# Patient Record
Sex: FEMALE | Race: WHITE | NOT HISPANIC OR LATINO | Employment: OTHER | URBAN - METROPOLITAN AREA
[De-identification: names, ages, dates, MRNs, and addresses within clinical notes are randomized per-mention and may not be internally consistent; named-entity substitution may affect disease eponyms.]

---

## 2017-01-06 ENCOUNTER — OFFICE VISIT (OUTPATIENT)
Dept: MEDICAL GROUP | Facility: CLINIC | Age: 72
End: 2017-01-06
Payer: MEDICARE

## 2017-01-06 VITALS
RESPIRATION RATE: 16 BRPM | TEMPERATURE: 98.8 F | HEART RATE: 60 BPM | WEIGHT: 86 LBS | BODY MASS INDEX: 15.24 KG/M2 | SYSTOLIC BLOOD PRESSURE: 118 MMHG | HEIGHT: 63 IN | OXYGEN SATURATION: 99 % | DIASTOLIC BLOOD PRESSURE: 70 MMHG

## 2017-01-06 DIAGNOSIS — F32.2 MAJOR DEPRESS DIS, SEVERE: ICD-10-CM

## 2017-01-06 DIAGNOSIS — Z86.73 RECENT CEREBROVASCULAR ACCIDENT: ICD-10-CM

## 2017-01-06 DIAGNOSIS — M05.79 RHEUMATOID ARTHRITIS INVOLVING MULTIPLE SITES WITH POSITIVE RHEUMATOID FACTOR (HCC): ICD-10-CM

## 2017-01-06 DIAGNOSIS — Z79.891 CHRONIC USE OF OPIATE FOR THERAPEUTIC PURPOSE: ICD-10-CM

## 2017-01-06 DIAGNOSIS — M80.08XD FRACTURE OF VERTEBRA DUE TO OSTEOPOROSIS WITH ROUTINE HEALING: ICD-10-CM

## 2017-01-06 DIAGNOSIS — M54.89 VERTEBROGENIC PAIN SYNDROME: ICD-10-CM

## 2017-01-06 DIAGNOSIS — R63.4 UNINTENTIONAL WEIGHT LOSS OF MORE THAN 10% BODY WEIGHT WITHIN 6 MONTHS: ICD-10-CM

## 2017-01-06 PROCEDURE — 99214 OFFICE O/P EST MOD 30 MIN: CPT | Performed by: FAMILY MEDICINE

## 2017-01-06 RX ORDER — HYDROCODONE BITARTRATE AND ACETAMINOPHEN 10; 325 MG/1; MG/1
1 TABLET ORAL EVERY 4 HOURS PRN
Qty: 180 TAB | Refills: 0 | Status: SHIPPED | OUTPATIENT
Start: 2017-01-06 | End: 2017-01-06 | Stop reason: SDUPTHER

## 2017-01-06 RX ORDER — MEGESTROL ACETATE 20 MG/1
20 TABLET ORAL DAILY
Qty: 30 TAB | Refills: 3 | Status: SHIPPED | OUTPATIENT
Start: 2017-01-06 | End: 2017-06-10 | Stop reason: SDUPTHER

## 2017-01-06 RX ORDER — ATORVASTATIN CALCIUM 40 MG/1
40 TABLET, FILM COATED ORAL
Qty: 30 TAB | Refills: 6 | Status: SHIPPED | OUTPATIENT
Start: 2017-01-06 | End: 2017-07-03 | Stop reason: SDUPTHER

## 2017-01-06 RX ORDER — HYDROCODONE BITARTRATE AND ACETAMINOPHEN 10; 325 MG/1; MG/1
1 TABLET ORAL EVERY 4 HOURS PRN
Qty: 180 TAB | Refills: 0 | Status: SHIPPED | OUTPATIENT
Start: 2017-01-06 | End: 2017-04-03 | Stop reason: SDUPTHER

## 2017-01-06 RX ORDER — MIRTAZAPINE 15 MG/1
15 TABLET, FILM COATED ORAL
Qty: 30 TAB | Refills: 6 | Status: SHIPPED | OUTPATIENT
Start: 2017-01-06 | End: 2017-07-03 | Stop reason: SDUPTHER

## 2017-01-06 NOTE — MR AVS SNAPSHOT
"        Lakisha Villegas Davy   2017 11:00 AM   Office Visit   MRN: [id number]    Department:  [facility]   Dept Phone:  [phone]    Description:  Female : 1945   Provider:  Mariella Padilla M.D.           Reason for Visit     Arthritis pt refuses HRA    Back Pain     Hyperlipidemia     Depression           Allergies as of 2017     Allergen Noted Reactions    Gold 2007   Hives    Hydrochlorothiazide [Hctz] 10/01/2007   Itching    Imuran [Azathioprine Sodium] 2010   Vomiting    Naproxen 10/01/2007   Itching    Azathioprine 10/01/2007       DOES NOT REMEMBER REACTIOON    Hydrochlorothiazide 2016       Naproxen 2016       Relafen [Nabumetone] 10/01/2007       Did not work    Tape 2015       Paper tape is ok      You were diagnosed with     Unintentional weight loss of more than 10% body weight within 6 months   [9332995]       Vertebrogenic pain syndrome   [260897]       Rheumatoid arthritis involving multiple sites with positive rheumatoid factor (HCC)   [0178953]       Fracture of vertebra due to osteoporosis with routine healing   [7280748]       Chronic use of opiate for therapeutic purpose   [1787008]       Recent cerebrovascular accident   [3805708]       Major depress dis, severe (HCC)   [6845354]         Vital Signs     Blood Pressure Pulse Temperature Respirations Height Weight    118/70 mmHg 60 37.1 °C (98.8 °F) 16 1.6 m (5' 2.99\") 39.009 kg (86 lb)    Body Mass Index Oxygen Saturation Smoking Status             15.24 kg/m2 99% Current Every Day Smoker         Basic Information     Date Of Birth Sex Race Ethnicity Preferred Language    1945 Female White Non- English      Your appointments     2017 11:00 AM   Established Patient with Mariella Padilla M.D.   [address] [place] [id number] [phone]           You will be receiving a confirmation call a few days before your " appointment from our automated call confirmation system.            Jul 13, 2017 10:00 AM   Follow Up Visit with Helen Merino PA-C   [facility] (--)    [address], [address]  [facility] [address] [phone]           You will be receiving a confirmation call a few days before your appointment from our automated call confirmation system.              Problem List              ICD-10-CM Priority Class Noted - Resolved    Rheumatoid arthritis (HCC) M06.9   Unknown - Present    Essential hypertension I10   2/4/2010 - Present    Carotid artery stenosis I65.29   Unknown - Present    History of cerebrovascular accident with residual effects I69.90 High  1/1/2010 - Present    Dyslipidemia, goal LDL below 100 E78.5   Unknown - Present    Postmenopausal osteoporosis M81.0   Unknown - Present    Vitamin D deficiency disease E55.9   Unknown - Present    MEDICAL HOME    10/10/2012 - Present    Vertebrogenic pain syndrome M54.9   Unknown - Present    Tobacco dependence F17.200   1/14/2015 - Present    Fracture of vertebra due to osteoporosis with routine healing M80.88XD   8/14/2015 - Present    Abdominal aortic atherosclerosis (HCC) I70.0   11/10/2015 - Present    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   11/10/2015 - Present    Chronic use of opiate for therapeutic purpose Z79.899   11/10/2015 - Present    Unintentional weight loss of 10% body weight within 6 months R63.4   11/11/2015 - Present    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   12/22/2015 - Present    Cerebrovascular accident (CVA) with involvement of right side of body (HCC) I63.9   5/20/2016 - Present    RA (rheumatoid arthritis) (HCC) M06.9   5/21/2016 - Present    Chronic back pain M54.9, G89.29   5/21/2016 - Present    Hypertension I10   5/21/2016 - Present    Unintended weight loss R63.4   5/21/2016 - Present    Iliac artery stenosis, right (HCC) I77.1   Unknown - Present    Claudication of right lower extremity (HCC) I73.9   12/2/2016 -  Present    Major depress dis, severe (HCC) F32.2   1/6/2017 - Present      Health Maintenance        Date Due Completion Dates    IMM ZOSTER VACCINE 10/21/2007 ---    MAMMOGRAM 4/22/2017 4/22/2016, 9/20/2013, 12/30/2011, 12/30/2011 (Prv Comp), 2/16/2011, 2/8/2011    Override on 12/30/2011: Previously completed    BONE DENSITY 9/20/2018 9/20/2013, 6/8/2010, 6/18/2007, 2/11/2005    COLONOSCOPY 1/24/2019 1/24/2016    IMM DTaP/Tdap/Td Vaccine (2 - Td) 10/8/2023 10/8/2013            Current Immunizations     13-VALENT PCV PREVNAR 11/18/2016    Influenza Vaccine Adult HD 11/18/2016, 10/12/2015  9:32 AM, 11/10/2013    Influenza Vaccine Quad Inj (Pf) 10/3/2014    Pneumococcal Vaccine (UF)Historical Data 10/9/2007    Pneumococcal polysaccharide vaccine (PPSV-23) 1/14/2015    Tdap Vaccine 10/8/2013      Below and/or attached are the medications your provider expects you to take. Review all of your home medications and newly ordered medications with your provider and/or pharmacist. Follow medication instructions as directed by your provider and/or pharmacist. Please keep your medication list with you and share with your provider. Update the information when medications are discontinued, doses are changed, or new medications (including over-the-counter products) are added; and carry medication information at all times in the event of emergency situations     Allergies:  GOLD - Hives     HYDROCHLOROTHIAZIDE - Itching     IMURAN - Vomiting     NAPROXEN - Itching     AZATHIOPRINE - (reactions not documented)     HYDROCHLOROTHIAZIDE - (reactions not documented)     NAPROXEN - (reactions not documented)     RELAFEN - (reactions not documented)     TAPE - (reactions not documented)               Medications  Valid as of: January 06, 2017 -  3:08 PM    Generic Name Brand Name Tablet Size Instructions for use    Aspirin (Tablet Delayed Response) ECOTRIN 81 MG Take 650 mg by mouth every day.        Atorvastatin Calcium (Tab) LIPITOR 40  MG Take 1 Tab by mouth every bedtime.        Clopidogrel Bisulfate (Tab) PLAVIX 75 MG Take 1 Tab by mouth every day.        DiltiaZEM HCl Coated Beads (CAPSULE SR 24 HR) CARDIZEM  MG TAKE ONE CAPSULE BY MOUTH ONCE DAILY        Folic Acid (Tab) FOLVITE 1 MG Take 2 mg by mouth every day.        Hydrocodone-Acetaminophen (Tab) NORCO  MG Take 1 Tab by mouth every four hours as needed for Moderate Pain or Severe Pain.        Lisinopril (Tab) PRINIVIL, ZESTRIL 40 MG Take 1 Tab by mouth every day.        LORazepam (Tab) ATIVAN 0.5 MG Take 1 Tab by mouth 2 times a day as needed for Anxiety.        Megestrol Acetate (Tab) MEGACE 20 MG Take 1 Tab by mouth every day.        Mirtazapine (Tab) REMERON 15 MG Take 1 Tab by mouth every bedtime.        Misc. Devices (Misc) Misc. Devices  This is an order for home oxygen. 2 L nasal prongs. O2 sat 82% at rest on room air today from home health nurse. Diagnosis hypoxia, recent CVA    R09.02, Z78.9       MAYA 99 months   NPI [id number]        Potassium Chloride (Tab CR) KLOR-CON 10 MEQ Take 1 Tab by mouth every day.        PredniSONE (Tab) DELTASONE 5 MG Take 5 mg by mouth every day.        SulfaSALAzine (Tab) AZULFIDINE 500 MG Take 500 mg by mouth 2 times a day.        TraZODone HCl (Tab) DESYREL 100 MG Take 1 Tab by mouth every bedtime.        .                 Medicines prescribed today were sent to:     [facility] [address] [place] [address] [place] [address]    Phone: [phone] Fax: [phone]    Open 24 Hours?: No      Medication refill instructions:       If your prescription bottle indicates you have medication refills left, it is not necessary to call your provider’s office. Please contact your pharmacy and they will refill your medication.    If your prescription bottle indicates you do not have any refills left, you may request refills at any time through one of the following ways: The online [employer] system (except  Urgent Care), by calling your provider’s office, or by asking your pharmacy to contact your provider’s office with a refill request. Medication refills are processed only during regular business hours and may not be available until the next business day. Your provider may request additional information or to have a follow-up visit with you prior to refilling your medication.   *Please Note: Medication refills are assigned a new Rx number when refilled electronically. Your pharmacy may indicate that no refills were authorized even though a new prescription for the same medication is available at the pharmacy. Please request the medicine by name with the pharmacy before contacting your provider for a refill.        Other Notes About Your Plan     [facility]  patient has been treated for her arthritic pain with Norco and then Percocet for > 1 year. ? Opioid dependence, continuous code 304.01           MyChart Access Code: Activation code not generated  Current [facility] Status: Active

## 2017-01-06 NOTE — PROGRESS NOTES
CC: weight loss, back pain, pain from rheumatoid arthritis    HPI:   Lakisha presents today with the following.    1. Unintentional weight loss of more than 10% body weight within 6 months  She has persistent weight loss.   She feels the celexa has helped her mood along with the trazodone but has further reduced her appetite.    2. Vertebrogenic pain syndrome/fracture of vertebra due to osteoporosis with routine healing.  The vertebral fracture was a major source of pain but the multiple level osteoarthritis and rheumatologic changes from the rheumatoid arthritis are also a factor. This is a source of chronic pain. Unfortunately, she is unable to take anti-inflammatories due in part to being on an anticoagulant now chronically but also because she is fundamentally allergic to nonsteroidal anti-inflammatories anyway.    3. Rheumatoid arthritis involving multiple sites with positive rheumatoid factor (HCC)  She continues to follow closely with Dr. Goodwin who is her rheumatologist. She has been following with him for a long time. They have tried several Biologics which she was unable to tolerate. She is on Azulfidine and low-dose steroids. They have tried to minimize the steroids as she already has osteoporosis but she is between a rock and a hard place. She was on Miacalcin to assist her bone density. This will need reevaluation. She is working on stopping her smoking which is also a factor.    4. Chronic use of opiate for therapeutic purpose  No aberrant behavior with medications or addictive behaviors been observed.    6. Recent cerebrovascular accident  She actually had 2 CVAs. She is now on Plavix and aspirin indefinitely.  She has been compliant with this but has been unable to stop smoking. Discussed smoking cessation. Discussed the importance of good diet and good blood pressure control. She knows to avoid all anti-inflammatories with her anticoagulation.    7. Major depress dis, severe (HCC)  This is quite a  serious problem. She is having continued loss of appetite. She feels much better otherwise and has been more interactive with family and much less tearful. She feels the Celexa is helpful but is afraid that it is reducing her appetite. I feel she may be correct. She was unable to tolerate the Paxil. I would like to change her to an antidepressant that has more association with weight gain and is less closely related to the Paxil      Patient Active Problem List    Diagnosis Date Noted   • History of cerebrovascular accident with residual effects 01/01/2010     Priority: High   • Major depress dis, severe ([facility]) 01/06/2017   • Claudication of right lower extremity ([facility]) 12/02/2016   • Iliac artery stenosis, right ([facility])    • RA (rheumatoid arthritis) ([facility]) 05/21/2016   • Chronic back pain 05/21/2016   • Hypertension 05/21/2016   • Unintended weight loss 05/21/2016   • Cerebrovascular accident (CVA) with involvement of right side of body ([facility]) 05/20/2016   • 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   • Unintentional weight loss of 10% body weight within 6 months 11/11/2015   • Abdominal aortic atherosclerosis ([facility]) 11/10/2015   • 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[facility]) 11/10/2015   • Chronic use of opiate for therapeutic purpose 11/10/2015   • Fracture of vertebra due to osteoporosis with routine healing 08/14/2015   • Tobacco dependence 01/14/2015   • Vertebrogenic pain syndrome    • MEDICAL HOME 10/10/2012   • Postmenopausal osteoporosis    • Vitamin D deficiency disease    • Dyslipidemia, goal LDL below 100    • Essential hypertension 02/04/2010   • Carotid artery stenosis    • Rheumatoid arthritis ([facility])        Current Outpatient Prescriptions   Medication Sig Dispense Refill   • predniSONE (DELTASONE) 5 MG Tab Take 5 mg by mouth every day.     • citalopram (CELEXA) 20 MG Tab Take 1 Tab by mouth every day. 30 Tab 6   • lorazepam (ATIVAN) 0.5 MG Tab Take 1 Tab by mouth 2 times a day as needed for Anxiety. 60 Tab 3   •  "hydrocodone/acetaminophen (NORCO)  MG Tab Take 1 Tab by mouth every four hours as needed for Moderate Pain or Severe Pain. 180 Tab 0   • lisinopril (PRINIVIL, ZESTRIL) 40 MG tablet Take 1 Tab by mouth every day. 90 Tab 3   • trazodone (DESYREL) 100 MG Tab Take 1 Tab by mouth every bedtime. 30 Tab 6   • clopidogrel (PLAVIX) 75 MG Tab Take 1 Tab by mouth every day. 90 Tab 2   • potassium chloride ER (KLOR-CON) 10 MEQ tablet Take 1 Tab by mouth every day. 30 Tab 6   • atorvastatin (LIPITOR) 40 MG Tab Take 1 Tab by mouth every day. 30 Tab 6   • Misc. Devices Misc This is an order for home oxygen. 2 L nasal prongs. O2 sat 82% at rest on room air today from home health nurse. Diagnosis hypoxia, recent CVA    R09.02, Z78.9       MAYA 99 months   NPI [id number] 1 Each 0   • sulfaSALAzine (AZULFIDINE) 500 MG Tab Take 500 mg by mouth 2 times a day.     • diltiazem CD (CARDIZEM CD) 180 MG CAPSULE SR 24 HR TAKE ONE CAPSULE BY MOUTH ONCE DAILY 30 Cap 11   • aspirin EC (ECOTRIN) 81 MG Tablet Delayed Response Take 650 mg by mouth every day.     • folic acid (FOLVITE) 1 MG TABS Take 2 mg by mouth every day.       No current facility-administered medications for this visit.         Allergies as of 01/06/2017 - Carlos as Reviewed 01/06/2017   Allergen Reaction Noted   • Gold Hives 08/13/2007   • Hydrochlorothiazide [hctz] Itching 10/01/2007   • Imuran [azathioprine sodium] Vomiting 02/17/2010   • Naproxen Itching 10/01/2007   • Azathioprine  10/01/2007   • Hydrochlorothiazide  05/20/2016   • Naproxen  05/20/2016   • Relafen [nabumetone]  10/01/2007   • Tape  12/08/2015        ROS: As per HPI.    /70 mmHg  Pulse 60  Temp(Src) 37.1 °C (98.8 °F)  Resp 16  Ht 1.6 m (5' 2.99\")  Wt 39.009 kg (86 lb)  BMI 15.24 kg/m2  SpO2 99%    Physical Exam:  Gen:         Alert and oriented, No apparent distress, more alert and interactive. Not tearful today. Lucid and fluent.  Neck:       Range of motion is somewhat limited in extension " and flexion. Rotation appears to be adequate. No nuchal rigidity. There is posterior cervical spasm and moderate cervical spinous process tenderness. No JVD or carotid bruits are appreciated. No cervical adenopathy is appreciated.   Lungs:     Clear to auscultation bilaterally  CV:          Regular rate and rhythm. No murmurs, rubs or gallops.               Ext:          No clubbing, cyanosis, edema.  Back:       There is spinous process tenderness throughout most of the spine. This is worse in her mid back. She feels this is much improved over what it was at the worst of her pain.  There is bilateral sacroiliac tenderness which is mild. Her hands show mild deformity and stiffness with tenderness to palpation of the MCP joints.      Assessment and Plan.   71 y.o. female with the following issues.    1. Unintentional weight loss of more than 10% body weight within 6 months  She continues to lose weight. I do believe the citalopram is partly to blame but she does need appetite stimulation. Have discussed trial of Megace. We will also switch the antidepressant to one that is more associated with weight gain.  - megestrol (MEGACE) 20 MG Tab; Take 1 Tab by mouth every day.  Dispense: 30 Tab; Refill: 3    2. Vertebrogenic pain syndrome  She has multiple level arthritic change in her back which is a combination of both rheumatoid and osteoarthritis. The majority of her pain has been due to the vertebral fracture. The pain medication continues to be helpful and well tolerated and is renewed  - hydrocodone/acetaminophen (NORCO)  MG Tab; Take 1 Tab by mouth every four hours as needed for Moderate Pain or Severe Pain.  Dispense: 180 Tab; Refill: 0    3. Rheumatoid arthritis involving multiple sites with positive rheumatoid factor (HCC)  She continues following with rheumatology. Her rheumatoid is relatively quiet at this time. She cannot take anti-inflammatories as she is on chronic anticoagulation therapy after her  stroke.  - hydrocodone/acetaminophen (NORCO)  MG Tab; Take 1 Tab by mouth every four hours as needed for Moderate Pain or Severe Pain.  Dispense: 180 Tab; Refill: 0    4. Fracture of vertebra due to osteoporosis with routine healing  The vertebral fracture is improving. The pain medication continues to be helpful and well tolerated and is renewed.  - hydrocodone/acetaminophen (NORCO)  MG Tab; Take 1 Tab by mouth every four hours as needed for Moderate Pain or Severe Pain.  Dispense: 180 Tab; Refill: 0    5. Chronic use of opiate for therapeutic purpose  [facility] Board of pharmacy interfaces without inconsistencies. She keeps the medication locked her under her personal control.  - CONSENT RISKS OF CONTROL SUBST    6. Recent cerebrovascular accident  She continues the atorvastatin and the Plavix since her stroke. She also continues baby aspirin at the direction of the neurologist and cardiologist. She has not been able to stop smoking. She and her  are continuing to work on this.  - atorvastatin (LIPITOR) 40 MG Tab; Take 1 Tab by mouth every bedtime.  Dispense: 30 Tab; Refill: 6    7. Major depress dis, severe (HCC)  This is been very severe and has responded somewhat to the citalopram. She had intolerable side effects to the Plavix. However, the citalopram is further reducing her appetite and her weight is less. Had discussed with her and her  switching to mirtazapine. They will give this a try. If she is doing worse within 2 weeks they will call me. I have explained to her that he is likely to see worsening and appreciated before she is and have asked her to listen to him on this subject. He is very concerned. They have been  many years.  - mirtazapine (REMERON) 15 MG Tab; Take 1 Tab by mouth every bedtime.  Dispense: 30 Tab; Refill: 6          Chronic pain recheck:   Last dose of controlled substance: This afternoon  Chronic pain treated with hydrocodone taken 4-5 times a day  sometimes a little more  Current alcohol or substance use: no    Consequences of Chronic Opiate therapy:  (5 A's)  Analgesia:  improved  Activity:  improved  Adverse Events:  none noted  Aberrant Behaviors: no inappropriate refills requested, lost or stolen meds reported.   Affect/Mood: good grooming, full facial expressions, normal speech pattern and content, normal reasoning    Nonnarcotic treatments that are being used: She follows closely with rheumatology and is on Azulfidine for her rheumatoid arthritis. She has failed several Biologics. She continues on trazodone to try to sleep. She continues on the prednisone to assist in controlling her rheumatoid arthritis. She cannot take anti-inflammatories as she is anticoagulation therapy for her stroke. She is on this indefinitely as she has now had 2 strokes.     Pain management agreement initiated/updated and signed on: Updated again today  Urine drug screen done: July 2016, which was reviewed today and is consistent with prescribed medications.    I have reviewed the medical records, the Prescription Monitoring Program and I have determined that controlled substance treatment is medically indicated.  [facility] board of pharmacy interface is without inconsistencies.  Her average MME is 66, active is 60.  This is within CDC guidelines.

## 2017-04-03 ENCOUNTER — OFFICE VISIT (OUTPATIENT)
Dept: MEDICAL GROUP | Facility: CLINIC | Age: 72
End: 2017-04-03
Payer: MEDICARE

## 2017-04-03 ENCOUNTER — RESOLUTE PROFESSIONAL BILLING HOSPITAL PROF FEE (OUTPATIENT)
Dept: HOSPITALIST | Facility: MEDICAL CENTER | Age: 72
End: 2017-04-03
Payer: MEDICARE

## 2017-04-03 ENCOUNTER — APPOINTMENT (OUTPATIENT)
Dept: RADIOLOGY | Facility: MEDICAL CENTER | Age: 72
End: 2017-04-03
Attending: EMERGENCY MEDICINE
Payer: MEDICARE

## 2017-04-03 ENCOUNTER — HOSPITAL ENCOUNTER (OUTPATIENT)
Facility: MEDICAL CENTER | Age: 72
End: 2017-04-06
Attending: EMERGENCY MEDICINE | Admitting: HOSPITALIST
Payer: MEDICARE

## 2017-04-03 VITALS
OXYGEN SATURATION: 95 % | HEIGHT: 63 IN | TEMPERATURE: 99.7 F | WEIGHT: 91 LBS | BODY MASS INDEX: 16.12 KG/M2 | RESPIRATION RATE: 16 BRPM | SYSTOLIC BLOOD PRESSURE: 160 MMHG | HEART RATE: 72 BPM | DIASTOLIC BLOOD PRESSURE: 80 MMHG

## 2017-04-03 DIAGNOSIS — M25.552 PAIN OF LEFT HIP JOINT: ICD-10-CM

## 2017-04-03 DIAGNOSIS — R63.4 UNINTENDED WEIGHT LOSS: ICD-10-CM

## 2017-04-03 DIAGNOSIS — M05.79 RHEUMATOID ARTHRITIS INVOLVING MULTIPLE SITES WITH POSITIVE RHEUMATOID FACTOR (HCC): ICD-10-CM

## 2017-04-03 DIAGNOSIS — Z79.891 CHRONIC USE OF OPIATE FOR THERAPEUTIC PURPOSE: ICD-10-CM

## 2017-04-03 DIAGNOSIS — I69.90 HISTORY OF CEREBROVASCULAR ACCIDENT WITH RESIDUAL EFFECTS: ICD-10-CM

## 2017-04-03 DIAGNOSIS — M54.89 VERTEBROGENIC PAIN SYNDROME: ICD-10-CM

## 2017-04-03 DIAGNOSIS — M80.08XD FRACTURE OF VERTEBRA DUE TO OSTEOPOROSIS WITH ROUTINE HEALING: ICD-10-CM

## 2017-04-03 PROBLEM — R52 INTRACTABLE PAIN: Status: ACTIVE | Noted: 2017-04-03
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
## 2017-04-03 LAB
ALBUMIN SERPL BCP-MCNC: 3.6 G/DL (ref 3.2–4.9)
ALBUMIN/GLOB SERPL: 1.3 G/DL
ALP SERPL-CCNC: 46 U/L (ref 30–99)
ALT SERPL-CCNC: 12 U/L (ref 2–50)
ANION GAP SERPL CALC-SCNC: 8 MMOL/L (ref 0–11.9)
AST SERPL-CCNC: 17 U/L (ref 12–45)
BASOPHILS # BLD AUTO: 0.7 % (ref 0–1.8)
BASOPHILS # BLD: 0.07 K/UL (ref 0–0.12)
BILIRUB SERPL-MCNC: 0.4 MG/DL (ref 0.1–1.5)
BUN SERPL-MCNC: 24 MG/DL (ref 8–22)
CALCIUM SERPL-MCNC: 9 MG/DL (ref 8.5–10.5)
CHLORIDE SERPL-SCNC: 111 MMOL/L (ref 96–112)
CO2 SERPL-SCNC: 20 MMOL/L (ref 20–33)
CREAT SERPL-MCNC: 0.58 MG/DL (ref 0.5–1.4)
EOSINOPHIL # BLD AUTO: 0.2 K/UL (ref 0–0.51)
EOSINOPHIL NFR BLD: 2.1 % (ref 0–6.9)
ERYTHROCYTE [DISTWIDTH] IN BLOOD BY AUTOMATED COUNT: 57.3 FL (ref 35.9–50)
GFR SERPL CREATININE-BSD FRML MDRD: >60 ML/MIN/1.73 M 2
GLOBULIN SER CALC-MCNC: 2.8 G/DL (ref 1.9–3.5)
GLUCOSE SERPL-MCNC: 98 MG/DL (ref 65–99)
HCT VFR BLD AUTO: 36.9 % (ref 37–47)
HGB BLD-MCNC: 11.6 G/DL (ref 12–16)
IMM GRANULOCYTES # BLD AUTO: 0.06 K/UL (ref 0–0.11)
IMM GRANULOCYTES NFR BLD AUTO: 0.6 % (ref 0–0.9)
INR PPP: 1.04 (ref 0.87–1.13)
LYMPHOCYTES # BLD AUTO: 1.64 K/UL (ref 1–4.8)
LYMPHOCYTES NFR BLD: 16.9 % (ref 22–41)
MCH RBC QN AUTO: 29.7 PG (ref 27–33)
MCHC RBC AUTO-ENTMCNC: 31.4 G/DL (ref 33.6–35)
MCV RBC AUTO: 94.6 FL (ref 81.4–97.8)
MONOCYTES # BLD AUTO: 0.82 K/UL (ref 0–0.85)
MONOCYTES NFR BLD AUTO: 8.5 % (ref 0–13.4)
NEUTROPHILS # BLD AUTO: 6.89 K/UL (ref 2–7.15)
NEUTROPHILS NFR BLD: 71.2 % (ref 44–72)
NRBC # BLD AUTO: 0 K/UL
NRBC BLD AUTO-RTO: 0 /100 WBC
PLATELET # BLD AUTO: 195 K/UL (ref 164–446)
PMV BLD AUTO: 10.2 FL (ref 9–12.9)
POTASSIUM SERPL-SCNC: 3.4 MMOL/L (ref 3.6–5.5)
PROT SERPL-MCNC: 6.4 G/DL (ref 6–8.2)
PROTHROMBIN TIME: 13.9 SEC (ref 12–14.6)
RBC # BLD AUTO: 3.9 M/UL (ref 4.2–5.4)
SODIUM SERPL-SCNC: 139 MMOL/L (ref 135–145)
WBC # BLD AUTO: 9.7 K/UL (ref 4.8–10.8)

## 2017-04-03 PROCEDURE — 85025 COMPLETE CBC W/AUTO DIFF WBC: CPT

## 2017-04-03 PROCEDURE — 36415 COLL VENOUS BLD VENIPUNCTURE: CPT

## 2017-04-03 PROCEDURE — G8432 DEP SCR NOT DOC, RNG: HCPCS | Performed by: FAMILY MEDICINE

## 2017-04-03 PROCEDURE — 96376 TX/PRO/DX INJ SAME DRUG ADON: CPT

## 2017-04-03 PROCEDURE — 1101F PT FALLS ASSESS-DOCD LE1/YR: CPT | Performed by: FAMILY MEDICINE

## 2017-04-03 PROCEDURE — 94760 N-INVAS EAR/PLS OXIMETRY 1: CPT

## 2017-04-03 PROCEDURE — 4004F PT TOBACCO SCREEN RCVD TLK: CPT | Performed by: FAMILY MEDICINE

## 2017-04-03 PROCEDURE — 99285 EMERGENCY DEPT VISIT HI MDM: CPT

## 2017-04-03 PROCEDURE — 700111 HCHG RX REV CODE 636 W/ 250 OVERRIDE (IP): Performed by: EMERGENCY MEDICINE

## 2017-04-03 PROCEDURE — 3014F SCREEN MAMMO DOC REV: CPT | Performed by: FAMILY MEDICINE

## 2017-04-03 PROCEDURE — 99220 PR INITIAL OBSERVATION CARE,LEVL III: CPT | Mod: 25 | Performed by: HOSPITALIST

## 2017-04-03 PROCEDURE — 80053 COMPREHEN METABOLIC PANEL: CPT

## 2017-04-03 PROCEDURE — 85610 PROTHROMBIN TIME: CPT

## 2017-04-03 PROCEDURE — 99213 OFFICE O/P EST LOW 20 MIN: CPT | Performed by: FAMILY MEDICINE

## 2017-04-03 PROCEDURE — G8598 ASA/ANTIPLAT THER USED: HCPCS | Performed by: FAMILY MEDICINE

## 2017-04-03 PROCEDURE — 4040F PNEUMOC VAC/ADMIN/RCVD: CPT | Performed by: FAMILY MEDICINE

## 2017-04-03 PROCEDURE — 96374 THER/PROPH/DIAG INJ IV PUSH: CPT

## 2017-04-03 PROCEDURE — 99406 BEHAV CHNG SMOKING 3-10 MIN: CPT | Performed by: HOSPITALIST

## 2017-04-03 PROCEDURE — G8419 CALC BMI OUT NRM PARAM NOF/U: HCPCS | Performed by: FAMILY MEDICINE

## 2017-04-03 PROCEDURE — 73501 X-RAY EXAM HIP UNI 1 VIEW: CPT | Mod: LT

## 2017-04-03 PROCEDURE — G0378 HOSPITAL OBSERVATION PER HR: HCPCS

## 2017-04-03 PROCEDURE — 96375 TX/PRO/DX INJ NEW DRUG ADDON: CPT

## 2017-04-03 RX ORDER — NICOTINE 21 MG/24HR
21 PATCH, TRANSDERMAL 24 HOURS TRANSDERMAL
Status: DISCONTINUED | OUTPATIENT
Start: 2017-04-04 | End: 2017-04-06 | Stop reason: HOSPADM

## 2017-04-03 RX ORDER — ONDANSETRON 2 MG/ML
4 INJECTION INTRAMUSCULAR; INTRAVENOUS ONCE
Status: COMPLETED | OUTPATIENT
Start: 2017-04-03 | End: 2017-04-03

## 2017-04-03 RX ORDER — HYDROCODONE BITARTRATE AND ACETAMINOPHEN 10; 325 MG/1; MG/1
1 TABLET ORAL EVERY 4 HOURS PRN
Qty: 180 TAB | Refills: 0 | Status: SHIPPED | OUTPATIENT
Start: 2017-04-03 | End: 2017-04-03 | Stop reason: SDUPTHER

## 2017-04-03 RX ORDER — ONDANSETRON 4 MG/1
4 TABLET, ORALLY DISINTEGRATING ORAL EVERY 4 HOURS PRN
Status: DISCONTINUED | OUTPATIENT
Start: 2017-04-03 | End: 2017-04-06 | Stop reason: HOSPADM

## 2017-04-03 RX ORDER — LORAZEPAM 0.5 MG/1
0.5 TABLET ORAL 2 TIMES DAILY PRN
Status: DISCONTINUED | OUTPATIENT
Start: 2017-04-03 | End: 2017-04-06 | Stop reason: HOSPADM

## 2017-04-03 RX ORDER — MORPHINE SULFATE 4 MG/ML
4 INJECTION, SOLUTION INTRAMUSCULAR; INTRAVENOUS ONCE
Status: COMPLETED | OUTPATIENT
Start: 2017-04-03 | End: 2017-04-03

## 2017-04-03 RX ORDER — AMOXICILLIN 250 MG
2 CAPSULE ORAL 2 TIMES DAILY
Status: DISCONTINUED | OUTPATIENT
Start: 2017-04-04 | End: 2017-04-06 | Stop reason: HOSPADM

## 2017-04-03 RX ORDER — ONDANSETRON 2 MG/ML
4 INJECTION INTRAMUSCULAR; INTRAVENOUS EVERY 4 HOURS PRN
Status: DISCONTINUED | OUTPATIENT
Start: 2017-04-03 | End: 2017-04-06 | Stop reason: HOSPADM

## 2017-04-03 RX ORDER — SODIUM CHLORIDE 9 MG/ML
1000 INJECTION, SOLUTION INTRAVENOUS CONTINUOUS
Status: DISCONTINUED | OUTPATIENT
Start: 2017-04-04 | End: 2017-04-06 | Stop reason: HOSPADM

## 2017-04-03 RX ORDER — PREDNISONE 5 MG/1
5 TABLET ORAL DAILY
Status: DISCONTINUED | OUTPATIENT
Start: 2017-04-04 | End: 2017-04-06 | Stop reason: HOSPADM

## 2017-04-03 RX ORDER — SULFASALAZINE 500 MG/1
500 TABLET ORAL 2 TIMES DAILY
Status: DISCONTINUED | OUTPATIENT
Start: 2017-04-04 | End: 2017-04-06 | Stop reason: HOSPADM

## 2017-04-03 RX ORDER — OXYCODONE HYDROCHLORIDE 5 MG/1
5 TABLET ORAL
Status: DISCONTINUED | OUTPATIENT
Start: 2017-04-03 | End: 2017-04-06 | Stop reason: HOSPADM

## 2017-04-03 RX ORDER — MEGESTROL ACETATE 20 MG/1
20 TABLET ORAL DAILY
Status: DISCONTINUED | OUTPATIENT
Start: 2017-04-04 | End: 2017-04-06 | Stop reason: HOSPADM

## 2017-04-03 RX ORDER — DILTIAZEM HYDROCHLORIDE 180 MG/1
180 CAPSULE, COATED, EXTENDED RELEASE ORAL
Status: DISCONTINUED | OUTPATIENT
Start: 2017-04-04 | End: 2017-04-06 | Stop reason: HOSPADM

## 2017-04-03 RX ORDER — POTASSIUM CHLORIDE 750 MG/1
10 TABLET, FILM COATED, EXTENDED RELEASE ORAL DAILY
Status: DISCONTINUED | OUTPATIENT
Start: 2017-04-04 | End: 2017-04-05

## 2017-04-03 RX ORDER — MORPHINE SULFATE 4 MG/ML
4 INJECTION, SOLUTION INTRAMUSCULAR; INTRAVENOUS
Status: DISCONTINUED | OUTPATIENT
Start: 2017-04-03 | End: 2017-04-06 | Stop reason: HOSPADM

## 2017-04-03 RX ORDER — CLOPIDOGREL BISULFATE 75 MG/1
75 TABLET ORAL DAILY
Status: DISCONTINUED | OUTPATIENT
Start: 2017-04-04 | End: 2017-04-06 | Stop reason: HOSPADM

## 2017-04-03 RX ORDER — HYDROCODONE BITARTRATE AND ACETAMINOPHEN 10; 325 MG/1; MG/1
1 TABLET ORAL EVERY 4 HOURS PRN
Qty: 180 TAB | Refills: 0 | Status: SHIPPED | OUTPATIENT
Start: 2017-04-03 | End: 2017-07-03 | Stop reason: SDUPTHER

## 2017-04-03 RX ORDER — POLYETHYLENE GLYCOL 3350 17 G/17G
1 POWDER, FOR SOLUTION ORAL
Status: DISCONTINUED | OUTPATIENT
Start: 2017-04-03 | End: 2017-04-06 | Stop reason: HOSPADM

## 2017-04-03 RX ORDER — OXYCODONE HYDROCHLORIDE 10 MG/1
10 TABLET ORAL
Status: DISCONTINUED | OUTPATIENT
Start: 2017-04-03 | End: 2017-04-06 | Stop reason: HOSPADM

## 2017-04-03 RX ORDER — BISACODYL 10 MG
10 SUPPOSITORY, RECTAL RECTAL
Status: DISCONTINUED | OUTPATIENT
Start: 2017-04-03 | End: 2017-04-06 | Stop reason: HOSPADM

## 2017-04-03 RX ORDER — TRAZODONE HYDROCHLORIDE 50 MG/1
100 TABLET ORAL
Status: DISCONTINUED | OUTPATIENT
Start: 2017-04-04 | End: 2017-04-06 | Stop reason: HOSPADM

## 2017-04-03 RX ORDER — HYDROCODONE BITARTRATE AND ACETAMINOPHEN 10; 325 MG/1; MG/1
1 TABLET ORAL EVERY 4 HOURS PRN
Status: DISCONTINUED | OUTPATIENT
Start: 2017-04-03 | End: 2017-04-03

## 2017-04-03 RX ORDER — ACETAMINOPHEN 325 MG/1
650 TABLET ORAL EVERY 6 HOURS PRN
Status: DISCONTINUED | OUTPATIENT
Start: 2017-04-03 | End: 2017-04-06 | Stop reason: HOSPADM

## 2017-04-03 RX ORDER — ATORVASTATIN CALCIUM 40 MG/1
40 TABLET, FILM COATED ORAL
Status: DISCONTINUED | OUTPATIENT
Start: 2017-04-04 | End: 2017-04-06 | Stop reason: HOSPADM

## 2017-04-03 RX ORDER — LISINOPRIL 20 MG/1
40 TABLET ORAL DAILY
Status: DISCONTINUED | OUTPATIENT
Start: 2017-04-04 | End: 2017-04-06 | Stop reason: HOSPADM

## 2017-04-03 RX ORDER — CLOPIDOGREL BISULFATE 75 MG/1
75 TABLET ORAL DAILY
Qty: 90 TAB | Refills: 3 | Status: SHIPPED | OUTPATIENT
Start: 2017-04-03 | End: 2018-04-04 | Stop reason: SDUPTHER

## 2017-04-03 RX ORDER — MIRTAZAPINE 15 MG/1
15 TABLET, FILM COATED ORAL
Status: DISCONTINUED | OUTPATIENT
Start: 2017-04-04 | End: 2017-04-06 | Stop reason: HOSPADM

## 2017-04-03 RX ADMIN — ONDANSETRON 4 MG: 2 INJECTION, SOLUTION INTRAMUSCULAR; INTRAVENOUS at 21:53

## 2017-04-03 RX ADMIN — MORPHINE SULFATE 4 MG: 4 INJECTION INTRAVENOUS at 21:53

## 2017-04-03 RX ADMIN — MORPHINE SULFATE 4 MG: 4 INJECTION INTRAVENOUS at 23:13

## 2017-04-03 ASSESSMENT — PAIN SCALES - GENERAL: PAINLEVEL_OUTOF10: 1

## 2017-04-03 ASSESSMENT — ENCOUNTER SYMPTOMS
SHORTNESS OF BREATH: 0
BACK PAIN: 0
DEPRESSION: 1
FALLS: 1
LOSS OF CONSCIOUSNESS: 0
ABDOMINAL PAIN: 0
NECK PAIN: 0

## 2017-04-03 ASSESSMENT — LIFESTYLE VARIABLES
HISTORY_ALCOHOL_USE: 0
DO YOU DRINK ALCOHOL: NO

## 2017-04-03 NOTE — IP AVS SNAPSHOT
[email or web address] Access Code: Activation code not generated  Current Sputnik8 Status: Active    [employer]  A secure, online tool to manage your health information     [employer]’s Sputnik8® is a secure, online tool that connects you to your personalized health information from the privacy of your home -- day or night - making it very easy for you to manage your healthcare. Once the activation process is completed, you can even access your medical information using the Sputnik8 dread, which is available for free in the Apple Dread store or Google Play store.     Sputnik8 provides the following levels of access (as shown below):   My Chart Features   [facility] Primary Care Doctor [facility]  Specialists [facility]  Urgent  Care Non-[facility]  Primary Care  Doctor   Email your healthcare team securely and privately 24/7 X X X X   Manage appointments: schedule your next appointment; view details of past/upcoming appointments X      Request prescription refills. X      View recent personal medical records, including lab and immunizations X X X X   View health record, including health history, allergies, medications X X X X   Read reports about your outpatient visits, procedures, consult and ER notes X X X X   See your discharge summary, which is a recap of your hospital and/or ER visit that includes your diagnosis, lab results, and care plan. X X       How to register for Sputnik8:  1. Go to  [email or web address].  2. Click on the Sign Up Now box, which takes you to the New Member Sign Up page. You will need to provide the following information:  a. Enter your Sputnik8 Access Code exactly as it appears at the top of this page. (You will not need to use this code after you’ve completed the sign-up process. If you do not sign up before the expiration date, you must request a new code.)   b. Enter your date of birth.   c. Enter your home email address.   d. Click Submit, and follow the next screen’s instructions.  3. Create a Sputnik8 ID. This will  be your [employer] login ID and cannot be changed, so think of one that is secure and easy to remember.  4. Create a [employer] password. You can change your password at any time.  5. Enter your Password Reset Question and Answer. This can be used at a later time if you forget your password.   6. Enter your e-mail address. This allows you to receive e-mail notifications when new information is available in [employer]  7. Click Sign Up. You can now view your health information.    For assistance activating your [employer] account, call [phone]

## 2017-04-03 NOTE — MR AVS SNAPSHOT
"        Lakisha Villegas Davy   4/3/2017 11:00 AM   Office Visit   MRN: [id number]    Department:  [facility]   Dept Phone:  [phone]    Description:  Female : 1945   Provider:  Mariella Padilla M.D.           Reason for Visit     Back Pain     Arthritis     Insomnia     Weight Loss           Allergies as of 4/3/2017     Allergen Noted Reactions    Gold 2007   Hives    Hydrochlorothiazide [Hctz] 10/01/2007   Itching    Imuran [Azathioprine Sodium] 2010   Vomiting    Naproxen 10/01/2007   Itching    Azathioprine 10/01/2007       DOES NOT REMEMBER REACTIOON    Hydrochlorothiazide 2016       Naproxen 2016       Relafen [Nabumetone] 10/01/2007       Did not work    Tape 2015       Paper tape is ok      You were diagnosed with     Vertebrogenic pain syndrome   [354052]       Rheumatoid arthritis involving multiple sites with positive rheumatoid factor (CMS-HCC)   [0195153]       Fracture of vertebra due to osteoporosis with routine healing   [7969150]       Chronic use of opiate for therapeutic purpose   [1546589]       Unintended weight loss   [501282]       History of cerebrovascular accident with residual effects   [443116]         Vital Signs     Blood Pressure Pulse Temperature Respirations Height Weight    160/80 mmHg 72 37.6 °C (99.7 °F) 16 1.6 m (5' 2.99\") 41.277 kg (91 lb)    Body Mass Index Oxygen Saturation Smoking Status             16.12 kg/m2 95% Current Every Day Smoker         Basic Information     Date Of Birth Sex Race Ethnicity Preferred Language    1945 Female White Non- English      Your appointments     2017 10:00 AM   Follow Up Visit with Helen Merino PA-C   [facility] Neurology (--)    [address], [address]  [facility] [address] [phone]           You will be receiving a confirmation call a few days before your appointment from our automated call confirmation system.              Problem List             " ICD-10-CM Priority Class Noted - Resolved    Rheumatoid arthritis (CMS-HCC) M06.9   Unknown - Present    Essential hypertension I10   2/4/2010 - Present    Carotid artery stenosis I65.29   Unknown - Present    History of cerebrovascular accident with residual effects I69.90 High  1/1/2010 - Present    Dyslipidemia, goal LDL below 100 E78.5   Unknown - Present    Postmenopausal osteoporosis M81.0   Unknown - Present    Vitamin D deficiency disease E55.9   Unknown - Present    MEDICAL HOME    10/10/2012 - Present    Vertebrogenic pain syndrome M54.9   Unknown - Present    Tobacco dependence F17.200   1/14/2015 - Present    Fracture of vertebra due to osteoporosis with routine healing M80.88XD   8/14/2015 - Present    Abdominal aortic atherosclerosis (CMS-HCC) I70.0   11/10/2015 - Present    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   11/10/2015 - Present    Chronic use of opiate for therapeutic purpose Z79.899   11/10/2015 - Present    Unintentional weight loss of 10% body weight within 6 months R63.4   11/11/2015 - Present    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   12/22/2015 - Present    Cerebrovascular accident (CVA) with involvement of right side of body (CMS-HCC) I63.9   5/20/2016 - Present    RA (rheumatoid arthritis) (CMS-HCC) M06.9   5/21/2016 - Present    Chronic back pain M54.9, G89.29   5/21/2016 - Present    Hypertension I10   5/21/2016 - Present    Unintended weight loss R63.4   5/21/2016 - Present    Iliac artery stenosis, right (CMS-HCC) I77.1   Unknown - Present    Claudication of right lower extremity (CMS-HCC) I73.9   12/2/2016 - Present    Major depress dis, severe (CMS-HCC) F32.2   1/6/2017 - Present      Health Maintenance        Date Due Completion Dates    IMM ZOSTER VACCINE 10/21/2007 ---    MAMMOGRAM 4/22/2017 4/22/2016, 9/20/2013, 12/30/2011, 12/30/2011 (Prv Comp), 2/16/2011, 2/8/2011    Override on 12/30/2011: Previously completed    BONE DENSITY 9/20/2018 9/20/2013, 6/8/2010, 6/18/2007,  2/11/2005    COLONOSCOPY 1/24/2019 1/24/2016, 1/18/2016    IMM DTaP/Tdap/Td Vaccine (2 - Td) 10/8/2023 10/8/2013            Current Immunizations     13-VALENT PCV PREVNAR 11/18/2016    Influenza Vaccine Adult HD 11/18/2016, 10/12/2015  9:32 AM, 11/10/2013    Influenza Vaccine Quad Inj (Pf) 10/3/2014    Pneumococcal Vaccine (UF)Historical Data 10/9/2007    Pneumococcal polysaccharide vaccine (PPSV-23) 1/14/2015    Tdap Vaccine 10/8/2013      Below and/or attached are the medications your provider expects you to take. Review all of your home medications and newly ordered medications with your provider and/or pharmacist. Follow medication instructions as directed by your provider and/or pharmacist. Please keep your medication list with you and share with your provider. Update the information when medications are discontinued, doses are changed, or new medications (including over-the-counter products) are added; and carry medication information at all times in the event of emergency situations     Allergies:  GOLD - Hives     HYDROCHLOROTHIAZIDE - Itching     IMURAN - Vomiting     NAPROXEN - Itching     AZATHIOPRINE - (reactions not documented)     HYDROCHLOROTHIAZIDE - (reactions not documented)     NAPROXEN - (reactions not documented)     RELAFEN - (reactions not documented)     TAPE - (reactions not documented)               Medications  Valid as of: April 03, 2017 - 11:46 AM    Generic Name Brand Name Tablet Size Instructions for use    Alendronate Sodium (Tab) FOSAMAX 70 MG TAKE ONE TABLET BY MOUTH ONCE A WEEK        Aspirin (Tablet Delayed Response) ECOTRIN 81 MG Take 650 mg by mouth every day.        Atorvastatin Calcium (Tab) LIPITOR 40 MG Take 1 Tab by mouth every bedtime.        Clopidogrel Bisulfate (Tab) PLAVIX 75 MG Take 1 Tab by mouth every day.        DiltiaZEM HCl Coated Beads (CAPSULE SR 24 HR) CARDIZEM  MG TAKE ONE CAPSULE BY MOUTH ONCE DAILY        Folic Acid (Tab) FOLVITE 1 MG Take 2 mg by  mouth every day.        Hydrocodone-Acetaminophen (Tab) NORCO  MG Take 1 Tab by mouth every four hours as needed for Moderate Pain or Severe Pain.        Lisinopril (Tab) PRINIVIL, ZESTRIL 40 MG Take 1 Tab by mouth every day.        LORazepam (Tab) ATIVAN 0.5 MG Take 1 Tab by mouth 2 times a day as needed for Anxiety.        Megestrol Acetate (Tab) MEGACE 20 MG Take 1 Tab by mouth every day.        Mirtazapine (Tab) REMERON 15 MG Take 1 Tab by mouth every bedtime.        Misc. Devices (Misc) Misc. Devices  This is an order for home oxygen. 2 L nasal prongs. O2 sat 82% at rest on room air today from home health nurse. Diagnosis hypoxia, recent CVA    R09.02, Z78.9       MAYA 99 months   NPI [id number]        Potassium Chloride (Tab CR) KLOR-CON 10 MEQ Take 1 Tab by mouth every day.        PredniSONE (Tab) DELTASONE 5 MG Take 5 mg by mouth every day.        SulfaSALAzine (Tab) AZULFIDINE 500 MG Take 500 mg by mouth 2 times a day.        TraZODone HCl (Tab) DESYREL 100 MG Take 1 Tab by mouth every bedtime.        .                 Medicines prescribed today were sent to:     [facility] [address] [place] [id number]    Phone: [phone] Fax: [phone]    Open 24 Hours?: No      Medication refill instructions:       If your prescription bottle indicates you have medication refills left, it is not necessary to call your provider’s office. Please contact your pharmacy and they will refill your medication.    If your prescription bottle indicates you do not have any refills left, you may request refills at any time through one of the following ways: The online [employer] system (except Urgent Care), by calling your provider’s office, or by asking your pharmacy to contact your provider’s office with a refill request. Medication refills are processed only during regular business hours and may not be available until the next business day. Your provider may  request additional information or to have a follow-up visit with you prior to refilling your medication.   *Please Note: Medication refills are assigned a new Rx number when refilled electronically. Your pharmacy may indicate that no refills were authorized even though a new prescription for the same medication is available at the pharmacy. Please request the medicine by name with the pharmacy before contacting your provider for a refill.        Other Notes About Your Plan     Patient is enrolled in [facility] Team with Dr Padilla  patient has been treated for her arthritic pain with Norco and then Percocet for > 1 year. ? Opioid dependence, continuous code 304.01           MyChart Access Code: Activation code not generated  Current [employer] Status: Active          Quit Tobacco Information     Do you want to quit using tobacco?    Quitting tobacco decreases risks of cancer, heart and lung disease, increases life expectancy, improves sense of taste and smell, and increases spending money, among other benefits.    If you are thinking about quitting, we can help.  • [employer] Quit Tobacco Program: 861.804.9433  o Program occurs weekly for four weeks and includes pharmacist consultation on products to support quitting smoking or chewing tobacco. A provider referral is needed for pharmacist consultation.  • Tobacco Users Help Hotline: 0-565-QUIT-NOW (336-3005) or https://[place].quitlogix.org/  o Free, confidential telephone and online coaching for [place] residents. Sessions are designed on a schedule that is convenient for you. Eligible clients receive free nicotine replacement therapy.  • Nationally: www.smokefree.gov  o Information and professional assistance to support both immediate and long-term needs as you become, and remain, a non-smoker. Smokefree.gov allows you to choose the help that best fits your needs.

## 2017-04-03 NOTE — IP AVS SNAPSHOT
" Home Care Instructions                                                                                                                  Name:Lakisha Bhatti  Medical Record Number:[id number]  CSN: [id number]    YOB: 1945   Age: 71 y.o.  Sex: female  HT:1.6 m (5' 2.99\") WT: 43.092 kg (95 lb)          Admit Date: 4/3/2017     Discharge Date:   Today's Date: 4/6/2017  Attending Doctor:  Susan Singleton M.D.                  Allergies:  Gold; Hydrochlorothiazide; Imuran; Naproxen; Azathioprine; Hydrochlorothiazide; Naproxen; Relafen; and Tape            Discharge Instructions       Discharge Instructions    Discharged to [place] by medical transportation with escort. Discharged via wheelchair, hospital escort: Yes.  Special equipment needed: Wheelchair    Be sure to schedule a follow-up appointment with your primary care doctor or any specialists as instructed.     Discharge Plan:   Diet Plan: Discussed (REgular )  Activity Level: Discussed (as tolerated, weight bearing as tolerated)  Smoking Cessation Offered: Patient Refused  Confirmed Follow up Appointment: Appointment Scheduled  Confirmed Symptoms Management: Discussed  Medication Reconciliation Updated: Yes  Influenza Vaccine Indication: Not indicated: Previously immunized this influenza season and > 8 years of age    I understand that a diet low in cholesterol, fat, and sodium is recommended for good health. Unless I have been given specific instructions below for another diet, I accept this instruction as my diet prescription.   Other diet: Regular      Special Instructions: None    · Is patient discharged on Warfarin / Coumadin?   No     · Is patient Post Blood Transfusion?  No    Depression / Suicide Risk    As you are discharged from this [facility] facility, it is important to learn how to keep safe from harming yourself.    Recognize the warning signs:  · Abrupt changes in personality, positive or negative- including increase in energy "   · Giving away possessions  · Change in eating patterns- significant weight changes-  positive or negative  · Change in sleeping patterns- unable to sleep or sleeping all the time   · Unwillingness or inability to communicate  · Depression  · Unusual sadness, discouragement and loneliness  · Talk of wanting to die  · Neglect of personal appearance   · Rebelliousness- reckless behavior  · Withdrawal from people/activities they love  · Confusion- inability to concentrate     If you or a loved one observes any of these behaviors or has concerns about self-harm, here's what you can do:  · Talk about it- your feelings and reasons for harming yourself  · Remove any means that you might use to hurt yourself (examples: pills, rope, extension cords, firearm)  · Get professional help from the community (Mental Health, Substance Abuse, psychological counseling)  · Do not be alone:Call your Safe Contact- someone whom you trust who will be there for you.  · Call your local CRISIS HOTLINE 748-2077 or 196-794-7866  · Call your local Children's Mobile Crisis Response Team [place] (237) 506-3343 or www.[employer]  · Call the toll free National Suicide Prevention Hotlines   · National Suicide Prevention Lifeline 176-195-WBQA (3207)  · National Hope Line Network 800-SUICIDE (423-0969)        Your appointments     Jul 03, 2017 11:00 AM   Established Patient with Mariella Padilla M.D.   [facility] [place] [facility]    [address] [facility] [address]  [place] [address] [phone]           You will be receiving a confirmation call a few days before your appointment from our automated call confirmation system.            Jul 13, 2017 10:00 AM   Follow Up Visit with Helen Merino PA-C   [facility] Neurology (--)    [address], [address]  [place] [address] [phone]           You will be receiving a confirmation call a few days before your appointment from our automated call confirmation system.                  Discharge Medication Instructions:    Below are the medications your physician expects you to take upon discharge:    Review all your home medications and newly ordered medications with your doctor and/or pharmacist. Follow medication instructions as directed by your doctor and/or pharmacist.    Please keep your medication list with you and share with your physician.               Medication List      START taking these medications        Instructions    tizanidine 4 MG Tabs   Commonly known as:  ZANAFLEX   Next Dose Due:  As needed for muscle spasms      Take 1 Tab by mouth every 6 hours as needed (muscle spasms).   Dose:  4 mg         CONTINUE taking these medications        Instructions    aspirin EC 81 MG Tbec   Last time this was given:  81 mg on 4/6/2017  8:20 AM   Commonly known as:  ECOTRIN    Take 81 mg by mouth every day.   Dose:  81 mg       atorvastatin 40 MG Tabs   Last time this was given:  40 mg on 4/5/2017  8:21 PM   Commonly known as:  LIPITOR    Take 1 Tab by mouth every bedtime.   Dose:  40 mg       clopidogrel 75 MG Tabs   Last time this was given:  75 mg on 4/6/2017  8:20 AM   Commonly known as:  PLAVIX    Take 1 Tab by mouth every day.   Dose:  75 mg       diltiazem  MG Cp24   Last time this was given:  180 mg on 4/6/2017  9:51 AM   Commonly known as:  CARDIZEM CD    TAKE ONE CAPSULE BY MOUTH ONCE DAILY       folic acid 1 MG Tabs   Commonly known as:  FOLVITE    Take 2 mg by mouth every day.   Dose:  2 mg       hydrocodone/acetaminophen  MG Tabs   Commonly known as:  NORCO    Doctor's comments:  Seen 4/3/17 This is for treatment of chronic pain including arthritis pain.  Do not fill until 5/1/17   Take 1 Tab by mouth every four hours as needed for Moderate Pain or Severe Pain.   Dose:  1 Tab       lisinopril 40 MG tablet   Last time this was given:  40 mg on 4/6/2017  8:20 AM   Commonly known as:  PRINIVIL, ZESTRIL    Take 1 Tab by mouth every day.   Dose:  40 mg        lorazepam 0.5 MG Tabs   Commonly known as:  ATIVAN    Doctor's comments:  Seen 11/18/16   Take 1 Tab by mouth 2 times a day as needed for Anxiety.   Dose:  0.5 mg       megestrol 20 MG Tabs   Last time this was given:  20 mg on 4/6/2017  8:21 AM   Commonly known as:  MEGACE    Take 1 Tab by mouth every day.   Dose:  20 mg       mirtazapine 15 MG Tabs   Last time this was given:  15 mg on 4/5/2017  8:21 PM   Commonly known as:  REMERON    Doctor's comments:  The citalopram is discontinued   Take 1 Tab by mouth every bedtime.   Dose:  15 mg       potassium chloride ER 10 MEQ tablet   Last time this was given:  20 mEq on 4/6/2017  8:20 AM   Commonly known as:  KLOR-CON    Take 1 Tab by mouth every day.   Dose:  10 mEq       predniSONE 5 MG Tabs   Last time this was given:  5 mg on 4/6/2017  8:20 AM   Commonly known as:  DELTASONE    Take 5 mg by mouth every day.   Dose:  5 mg       sulfaSALAzine 500 MG Tabs   Last time this was given:  500 mg on 4/6/2017  8:21 AM   Commonly known as:  AZULFIDINE    Take 500 mg by mouth 2 times a day.   Dose:  500 mg       trazodone 100 MG Tabs   Last time this was given:  100 mg on 4/5/2017  8:22 PM   Commonly known as:  DESYREL    Take 1 Tab by mouth every bedtime.   Dose:  100 mg               Instructions           Diet / Nutrition:    Follow any diet instructions given to you by your doctor or the dietician, including how much salt (sodium) you are allowed each day.    If you are overweight, talk to your doctor about a weight reduction plan.    Activity:    Remain physically active following your doctor's instructions about exercise and activity.    Rest often.     Any time you become even a little tired or short of breath, SIT DOWN and rest.    Worsening Symptoms:    Report any of the following signs and symptoms to the doctor's office immediately:    *Pain of jaw, arm, or neck  *Chest pain not relieved by medication                               *Dizziness or loss of  consciousness  *Difficulty breathing even when at rest   *More tired than usual                                       *Bleeding drainage or swelling of surgical site  *Swelling of feet, ankles, legs or stomach                 *Fever (>100ºF)  *Pink or blood tinged sputum  *Weight gain (3lbs/day or 5lbs /week)           *Shock from internal defibrillator (if applicable)  *Palpitations or irregular heartbeats                *Cool and/or numb extremities    Stroke Awareness    Common Risk Factors for Stroke include:    Age  Atrial Fibrillation  Carotid Artery Stenosis  Diabetes Mellitus  Excessive alcohol consumption  High blood pressure  Overweight   Physical inactivity  Smoking    Warning signs and symptoms of a stroke include:    *Sudden numbness or weakness of the face, arm or leg (especially on one side of the body).  *Sudden confusion, trouble speaking or understanding.  *Sudden trouble seeing in one or both eyes.  *Sudden trouble walking, dizziness, loss of balance or coordination.Sudden severe headache with no known cause.    It is very important to get treatment quickly when a stroke occurs. If you experience any of the above warning signs, call 911 immediately.                   Disclaimer         Quit Smoking / Tobacco Use:    I understand the use of any tobacco products increases my chance of suffering from future heart disease or stroke and could cause other illnesses which may shorten my life. Quitting the use of tobacco products is the single most important thing I can do to improve my health. For further information on smoking / tobacco cessation call a Toll Free Quit Line at 1-612.608.6038 (*National Cancer [place]) or 1-563.408.1617 (American Lung Association) or you can access the web based program at www.lungusa.org.    [place] Tobacco Users Help Line:  (719) 692-1386       Toll Free: 1-492.756.8891  Quit Tobacco Program [facility] (862)064-3320    Crisis Hotline:    National  Crisis Hotline:  4-614-XDMWPCB or 1-900.749.8391    [place] Crisis Hotline:    1-503.989.1063 or 384-479-0117    Discharge Survey:   Thank you for choosing [facility]. We hope we did everything we could to make your hospital stay a pleasant one. You may be receiving a phone survey and we would appreciate your time and participation in answering the questions. Your input is very valuable to us in our efforts to improve our service to our patients and their families.        My signature on this form indicates that:    1. I have reviewed and understand the above information.  2. My questions regarding this information have been answered to my satisfaction.  3. I have formulated a plan with my discharge nurse to obtain my prescribed medications for home.                  Disclaimer         __________________________________                     __________       ________                       Patient Signature                                                 Date                    Time

## 2017-04-03 NOTE — IP AVS SNAPSHOT
4/6/2017          Lakisha Bhatti  [address] [place] [address]    Dear Lakisha:    [facility] wants to ensure your discharge home is safe and you or your loved ones have had all your questions answered regarding your care after you leave the hospital.    You may receive a telephone call within two days of your discharge.  This call is to make certain you understand your discharge instructions as well as ensure we provided you with the best care possible during your stay with us.     The call will only last approximately 3-5 minutes and will be done by a nurse.    Once again, we want to ensure your discharge home is safe and that you have a clear understanding of any next steps in your care.  If you have any questions or concerns, please do not hesitate to contact us, we are here for you.  Thank you for choosing [facility] for your healthcare needs.    Sincerely,    Manolo Ambrocio    [facility]

## 2017-04-03 NOTE — IP AVS SNAPSHOT
" <p align=\"LEFT\"><IMG SRC=\"//[email or web address]$/[email or web address]\" alt=\"Image\" WIDTH=\"50%\" HEIGHT=\"200\" BORDER=\"\"></p>                   Name:Lakisha Bhatti  Medical Record Number:[id number]  CSN: [id number]    YOB: 1945   Age: 71 y.o.  Sex: female  HT:1.6 m (5' 2.99\") WT: 43.092 kg (95 lb)          Admit Date: 4/3/2017     Discharge Date:   Today's Date: 4/6/2017  Attending Doctor:  Susan Singleton M.D.                  Allergies:  Gold; Hydrochlorothiazide; Imuran; Naproxen; Azathioprine; Hydrochlorothiazide; Naproxen; Relafen; and Tape          Your appointments     Jul 03, 2017 11:00 AM   Established Patient with Mariella Padilla M.D.   [facility] ([facility])    [address] [place] [address] [address]  [facility] [id number] [phone]           You will be receiving a confirmation call a few days before your appointment from our automated call confirmation system.            Jul 13, 2017 10:00 AM   Follow Up Visit with Helen Merino PA-C   [facility] (--)    [address], [address]  [facility] [id number] [phone]           You will be receiving a confirmation call a few days before your appointment from our automated call confirmation system.                 Medication List      Take these Medications        Instructions    aspirin EC 81 MG Tbec   Commonly known as:  ECOTRIN    Take 81 mg by mouth every day.   Dose:  81 mg       atorvastatin 40 MG Tabs   Commonly known as:  LIPITOR    Take 1 Tab by mouth every bedtime.   Dose:  40 mg       clopidogrel 75 MG Tabs   Commonly known as:  PLAVIX    Take 1 Tab by mouth every day.   Dose:  75 mg       diltiazem  MG Cp24   Commonly known as:  CARDIZEM CD    TAKE ONE CAPSULE BY MOUTH ONCE DAILY       folic acid 1 MG Tabs   Commonly known as:  FOLVITE    Take 2 mg by mouth every day.   Dose:  2 mg       hydrocodone/acetaminophen  MG Tabs   Commonly known as:  NORCO    Doctor's comments:  Seen 4/3/17 This is for treatment of " chronic pain including arthritis pain.  Do not fill until 5/1/17   Take 1 Tab by mouth every four hours as needed for Moderate Pain or Severe Pain.   Dose:  1 Tab       lisinopril 40 MG tablet   Commonly known as:  PRINIVIL, ZESTRIL    Take 1 Tab by mouth every day.   Dose:  40 mg       lorazepam 0.5 MG Tabs   Commonly known as:  ATIVAN    Doctor's comments:  Seen 11/18/16   Take 1 Tab by mouth 2 times a day as needed for Anxiety.   Dose:  0.5 mg       megestrol 20 MG Tabs   Commonly known as:  MEGACE    Take 1 Tab by mouth every day.   Dose:  20 mg       mirtazapine 15 MG Tabs   Commonly known as:  REMERON    Doctor's comments:  The citalopram is discontinued   Take 1 Tab by mouth every bedtime.   Dose:  15 mg       potassium chloride ER 10 MEQ tablet   Commonly known as:  KLOR-CON    Take 1 Tab by mouth every day.   Dose:  10 mEq       predniSONE 5 MG Tabs   Commonly known as:  DELTASONE    Take 5 mg by mouth every day.   Dose:  5 mg       sulfaSALAzine 500 MG Tabs   Commonly known as:  AZULFIDINE    Take 500 mg by mouth 2 times a day.   Dose:  500 mg       tizanidine 4 MG Tabs   Commonly known as:  ZANAFLEX    Take 1 Tab by mouth every 6 hours as needed (muscle spasms).   Dose:  4 mg       trazodone 100 MG Tabs   Commonly known as:  DESYREL    Take 1 Tab by mouth every bedtime.   Dose:  100 mg

## 2017-04-03 NOTE — PROGRESS NOTES
CC: Chronic back pain, rheumatoid arthritis, weight loss, history of stroke    HPI:   Lakisha presents today with the following.    1. Vertebrogenic pain syndrome/fracture of vertebra due to osteoporosis was routine healing  This was a long time in healing. She continues to take low-dose calcium and vitamin D. She continues to not eat quite as well as she should. She is not a candidate for Fosamax. The pain medication takes the pain from a 7 down to a 3 or 4. This allows her to complete her ADLs.    2. Rheumatoid arthritis involving multiple sites with positive rheumatoid factor (CMS-HCC)  She continues to follow with rheumatology. She has multiple effected areas that are a source of chronic pain. Her hands show significant distortion.    3. Chronic use of opiate for therapeutic purpose  No aberrant or addictive use of medications has been observed.  Patient counseled to not add Tylenol to current regimen.  Patient counseled to keep medications locked up or under personal control.    4. Unintended weight loss  This is doing well as long as she reminds herself to keep eating. She finds she has no consistent appetite. Her  is trying to remind her. They feel she is doing better overall. She feels the regimen has helped her.    5. History of cerebrovascular accident with residual effects  She continues the Plavix and denies further dizziness, disorientation or falling.      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   • Unintentional weight loss of 10% body weight within 6 months 11/11/2015   • Abdominal aortic atherosclerosis (CMS-HCC) 11/10/2015   • 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   • Chronic use of opiate for therapeutic purpose 11/10/2015   • Fracture of vertebra due to osteoporosis with routine healing 08/14/2015   • Tobacco dependence 01/14/2015   • Vertebrogenic pain syndrome    • MEDICAL HOME 10/10/2012   • Postmenopausal osteoporosis    • Vitamin D deficiency disease    • Dyslipidemia, goal LDL below 100    • Essential hypertension 02/04/2010   • Carotid artery stenosis    • Rheumatoid arthritis (CMS-HCC)        Current Outpatient Prescriptions   Medication Sig Dispense Refill   • hydrocodone/acetaminophen (NORCO)  MG Tab Take 1 Tab by mouth every four hours as needed for Moderate Pain or Severe Pain. 180 Tab 0   • clopidogrel (PLAVIX) 75 MG Tab Take 1 Tab by mouth every day. 90 Tab 3   • alendronate (FOSAMAX) 70 MG Tab TAKE ONE TABLET BY MOUTH ONCE A WEEK 4 Tab 0   • megestrol (MEGACE) 20 MG Tab Take 1 Tab by mouth every day. 30 Tab 3   • mirtazapine (REMERON) 15 MG Tab Take 1 Tab by mouth every bedtime. 30 Tab 6   • atorvastatin (LIPITOR) 40 MG Tab Take 1 Tab by mouth every bedtime. 30 Tab 6   • predniSONE (DELTASONE) 5 MG Tab Take 5 mg by mouth every day.     • lorazepam (ATIVAN) 0.5 MG Tab Take 1 Tab by mouth 2 times a day as needed for Anxiety. 60 Tab 3   • lisinopril (PRINIVIL, ZESTRIL) 40 MG tablet Take 1 Tab by mouth every day. 90 Tab 3   • trazodone (DESYREL) 100 MG Tab Take 1 Tab by mouth every bedtime. 30 Tab 6   • potassium chloride ER (KLOR-CON) 10 MEQ tablet Take 1 Tab by mouth every day. 30 Tab 6   • Misc. Devices Misc This is an order for home oxygen. 2 L nasal prongs. O2 sat 82% at rest on room air today from home health nurse. Diagnosis hypoxia, recent CVA    R09.02, Z78.9       MAYA 99 months   NPI [id number] 1 Each 0   • sulfaSALAzine  "(AZULFIDINE) 500 MG Tab Take 500 mg by mouth 2 times a day.     • diltiazem CD (CARDIZEM CD) 180 MG CAPSULE SR 24 HR TAKE ONE CAPSULE BY MOUTH ONCE DAILY 30 Cap 11   • aspirin EC (ECOTRIN) 81 MG Tablet Delayed Response Take 650 mg by mouth every day.     • folic acid (FOLVITE) 1 MG TABS Take 2 mg by mouth every day.       No current facility-administered medications for this visit.         Allergies as of 04/03/2017 - Carlos as Reviewed 04/03/2017   Allergen Reaction Noted   • Gold Hives 08/13/2007   • Hydrochlorothiazide [hctz] Itching 10/01/2007   • Imuran [azathioprine sodium] Vomiting 02/17/2010   • Naproxen Itching 10/01/2007   • Azathioprine  10/01/2007   • Hydrochlorothiazide  05/20/2016   • Naproxen  05/20/2016   • Relafen [nabumetone]  10/01/2007   • Tape  12/08/2015        ROS: As per HPI.    /80 mmHg  Pulse 72  Temp(Src) 37.6 °C (99.7 °F)  Resp 16  Ht 1.6 m (5' 2.99\")  Wt 41.277 kg (91 lb)  BMI 16.12 kg/m2  SpO2 95%    Physical Exam:  Gen:         Alert and oriented, No apparent distress.  Lucid and fluent, good eye contact, more engaged, appropriate  Neck:        No Lymphadenopathy or Bruits.  Lungs:     Clear to auscultation bilaterally  CV:          Regular rate and rhythm. No murmurs, rubs or gallops.               Ext:          No clubbing, cyanosis, edema.      Assessment and Plan.   71 y.o. female with the following issues.    1. Vertebrogenic pain syndrome  Medication is helpful and well-tolerated and is renewed  - hydrocodone/acetaminophen (NORCO)  MG Tab; Take 1 Tab by mouth every four hours as needed for Moderate Pain or Severe Pain.  Dispense: 180 Tab; Refill: 0    2. Rheumatoid arthritis involving multiple sites with positive rheumatoid factor (CMS-HCC)  Medication is helpful and well tolerated and is renewed. She will continue treatment with her rheumatologist.  - hydrocodone/acetaminophen (NORCO)  MG Tab; Take 1 Tab by mouth every four hours as needed for Moderate " Pain or Severe Pain.  Dispense: 180 Tab; Refill: 0    3. Fracture of vertebra due to osteoporosis with routine healing  This has gradually improved. The medication is now working fairly well.  - hydrocodone/acetaminophen (NORCO)  MG Tab; Take 1 Tab by mouth every four hours as needed for Moderate Pain or Severe Pain.  Dispense: 180 Tab; Refill: 0    4. Chronic use of opiate for therapeutic purpose  [facility] board of pharmacy interface is reviewed.  No inconsistencies are found. Her average MME is 56, active is zero, max active is 60.  This is within CDC guideline for chronic pain prescribing by primary care.    5. Unintended weight loss  This is improved on her regular regimen    6. History of cerebrovascular accident with residual effects  She continues the clopidogrel on a regular basis. No further signs or symptoms. Discussed smoking cessation, she will be trying the nicotine patches.  - clopidogrel (PLAVIX) 75 MG Tab; Take 1 Tab by mouth every day.  Dispense: 90 Tab; Refill: 3        Chronic pain recheck:   Last dose of controlled substance: this am  Chronic pain treated with hydrocodone/apap taken 3-4 times a day    She  reports that she does not drink alcohol.  She  reports that she does not use illicit drugs.    Consequences of Chronic Opiate therapy:  (5 A's)  Analgesia: Compared to no treatment or prior treatment, pain is currently improved  Activity: not changed  Adverse Events: She denies constipation, dry mouth, itchy skin, nausea and sedation  Aberrant Behaviors: She reports she is taking medication as prescribed and is not veering from agreed treatment regimen. There have been no inappropriate refills or lost/stolen meds reported.   Affect/Mood: Pain is not impacting patient's mood.  Patient reports chronic stable depression/anxiety.    Nonnarcotic treatments that are being used: SSRI/SNRI and cannot take NSAIDS due to history of stroke and anticoagulation.   Treatment goals discussed.    Opioid  Risk Score: 1    Interpretation of Opioid Risk Score   Score 0-3 = Low risk of abuse. Do UDS at least once per year.  Score 4-7 = Moderate risk of abuse. Do UDS 1-4 times per year.  Score 8+ = High risk of abuse. Refer to specialist.    Last order of CONTROLLED SUBSTANCE TREATMENT AGREEMENT was found on 1/6/2017 from Office Visit on 1/6/2017     UDS Summary                URINE DRUG SCREEN Next Due 7/3/2017      Done 7/8/2016 PAIN MANAGEMENT PANEL, SCRN W/ RFLX TO QNT        Most recent UDS reviewed today and is consistent with prescribed medications.    I have reviewed the medical records, the Prescription Monitoring Program and I have determined that controlled substance treatment is medically indicated.

## 2017-04-04 ENCOUNTER — HOSPICE ADMISSION (OUTPATIENT)
Dept: HOSPICE | Facility: HOSPICE | Age: 72
End: 2017-04-04

## 2017-04-04 ENCOUNTER — APPOINTMENT (OUTPATIENT)
Dept: RADIOLOGY | Facility: MEDICAL CENTER | Age: 72
End: 2017-04-04
Attending: HOSPITALIST
Payer: MEDICARE

## 2017-04-04 PROBLEM — S32.10XA SACRAL FRACTURE, CLOSED (HCC): Status: ACTIVE | Noted: 2017-04-04

## 2017-04-04 LAB
ANION GAP SERPL CALC-SCNC: 7 MMOL/L (ref 0–11.9)
APPEARANCE UR: CLEAR
BILIRUB UR QL STRIP.AUTO: NEGATIVE
BUN SERPL-MCNC: 18 MG/DL (ref 8–22)
CALCIUM SERPL-MCNC: 8.9 MG/DL (ref 8.5–10.5)
CHLORIDE SERPL-SCNC: 110 MMOL/L (ref 96–112)
CO2 SERPL-SCNC: 22 MMOL/L (ref 20–33)
COLOR UR: NORMAL
CREAT SERPL-MCNC: 0.63 MG/DL (ref 0.5–1.4)
ERYTHROCYTE [DISTWIDTH] IN BLOOD BY AUTOMATED COUNT: 57.3 FL (ref 35.9–50)
GFR SERPL CREATININE-BSD FRML MDRD: >60 ML/MIN/1.73 M 2
GLUCOSE SERPL-MCNC: 91 MG/DL (ref 65–99)
GLUCOSE UR STRIP.AUTO-MCNC: NEGATIVE MG/DL
HCT VFR BLD AUTO: 36.4 % (ref 37–47)
HGB BLD-MCNC: 11 G/DL (ref 12–16)
KETONES UR STRIP.AUTO-MCNC: NEGATIVE MG/DL
LEUKOCYTE ESTERASE UR QL STRIP.AUTO: NEGATIVE
MCH RBC QN AUTO: 28.6 PG (ref 27–33)
MCHC RBC AUTO-ENTMCNC: 30.2 G/DL (ref 33.6–35)
MCV RBC AUTO: 94.8 FL (ref 81.4–97.8)
MICRO URNS: NORMAL
NITRITE UR QL STRIP.AUTO: NEGATIVE
PH UR STRIP.AUTO: 5.5 [PH]
PLATELET # BLD AUTO: 182 K/UL (ref 164–446)
PMV BLD AUTO: 9.4 FL (ref 9–12.9)
POTASSIUM SERPL-SCNC: 3.5 MMOL/L (ref 3.6–5.5)
PROT UR QL STRIP: NEGATIVE MG/DL
RBC # BLD AUTO: 3.84 M/UL (ref 4.2–5.4)
RBC UR QL AUTO: NEGATIVE
SODIUM SERPL-SCNC: 139 MMOL/L (ref 135–145)
SP GR UR STRIP.AUTO: 1.01
WBC # BLD AUTO: 7.8 K/UL (ref 4.8–10.8)

## 2017-04-04 PROCEDURE — 99226 PR SUBSEQUENT OBSERVATION CARE,LEVEL III: CPT | Performed by: HOSPITALIST

## 2017-04-04 PROCEDURE — 81003 URINALYSIS AUTO W/O SCOPE: CPT

## 2017-04-04 PROCEDURE — G8978 MOBILITY CURRENT STATUS: HCPCS | Mod: CJ

## 2017-04-04 PROCEDURE — A9270 NON-COVERED ITEM OR SERVICE: HCPCS | Performed by: HOSPITALIST

## 2017-04-04 PROCEDURE — 97162 PT EVAL MOD COMPLEX 30 MIN: CPT

## 2017-04-04 PROCEDURE — 85027 COMPLETE CBC AUTOMATED: CPT

## 2017-04-04 PROCEDURE — 36415 COLL VENOUS BLD VENIPUNCTURE: CPT

## 2017-04-04 PROCEDURE — G0378 HOSPITAL OBSERVATION PER HR: HCPCS

## 2017-04-04 PROCEDURE — G8979 MOBILITY GOAL STATUS: HCPCS | Mod: CI

## 2017-04-04 PROCEDURE — 96376 TX/PRO/DX INJ SAME DRUG ADON: CPT

## 2017-04-04 PROCEDURE — 700102 HCHG RX REV CODE 250 W/ 637 OVERRIDE(OP): Performed by: HOSPITALIST

## 2017-04-04 PROCEDURE — 80048 BASIC METABOLIC PNL TOTAL CA: CPT

## 2017-04-04 PROCEDURE — 97166 OT EVAL MOD COMPLEX 45 MIN: CPT

## 2017-04-04 PROCEDURE — 72131 CT LUMBAR SPINE W/O DYE: CPT

## 2017-04-04 PROCEDURE — G8987 SELF CARE CURRENT STATUS: HCPCS | Mod: CJ

## 2017-04-04 PROCEDURE — 700111 HCHG RX REV CODE 636 W/ 250 OVERRIDE (IP): Performed by: HOSPITALIST

## 2017-04-04 PROCEDURE — G8988 SELF CARE GOAL STATUS: HCPCS | Mod: CI

## 2017-04-04 PROCEDURE — 700105 HCHG RX REV CODE 258: Performed by: HOSPITALIST

## 2017-04-04 PROCEDURE — 700101 HCHG RX REV CODE 250: Performed by: HOSPITALIST

## 2017-04-04 RX ORDER — CYCLOBENZAPRINE HCL 10 MG
10 TABLET ORAL 3 TIMES DAILY PRN
Status: DISCONTINUED | OUTPATIENT
Start: 2017-04-04 | End: 2017-04-06 | Stop reason: HOSPADM

## 2017-04-04 RX ADMIN — OXYCODONE HYDROCHLORIDE 10 MG: 10 TABLET ORAL at 20:05

## 2017-04-04 RX ADMIN — DILTIAZEM HYDROCHLORIDE 180 MG: 180 CAPSULE, COATED, EXTENDED RELEASE ORAL at 08:17

## 2017-04-04 RX ADMIN — CYCLOBENZAPRINE HYDROCHLORIDE 10 MG: 10 TABLET, FILM COATED ORAL at 21:28

## 2017-04-04 RX ADMIN — LISINOPRIL 40 MG: 20 TABLET ORAL at 08:16

## 2017-04-04 RX ADMIN — OXYCODONE HYDROCHLORIDE 10 MG: 10 TABLET ORAL at 08:31

## 2017-04-04 RX ADMIN — OXYCODONE HYDROCHLORIDE 10 MG: 10 TABLET ORAL at 11:52

## 2017-04-04 RX ADMIN — CLOPIDOGREL 75 MG: 75 TABLET, FILM COATED ORAL at 08:16

## 2017-04-04 RX ADMIN — POTASSIUM CHLORIDE 10 MEQ: 750 TABLET, FILM COATED, EXTENDED RELEASE ORAL at 08:15

## 2017-04-04 RX ADMIN — ATORVASTATIN CALCIUM 40 MG: 40 TABLET, FILM COATED ORAL at 21:29

## 2017-04-04 RX ADMIN — SODIUM CHLORIDE 1000 ML: 9 INJECTION, SOLUTION INTRAVENOUS at 01:50

## 2017-04-04 RX ADMIN — STANDARDIZED SENNA CONCENTRATE AND DOCUSATE SODIUM 2 TABLET: 8.6; 5 TABLET, FILM COATED ORAL at 08:15

## 2017-04-04 RX ADMIN — OXYCODONE HYDROCHLORIDE 10 MG: 10 TABLET ORAL at 05:23

## 2017-04-04 RX ADMIN — MIRTAZAPINE 15 MG: 15 TABLET, FILM COATED ORAL at 20:05

## 2017-04-04 RX ADMIN — SODIUM CHLORIDE 1000 ML: 9 INJECTION, SOLUTION INTRAVENOUS at 20:14

## 2017-04-04 RX ADMIN — SODIUM CHLORIDE 1000 ML: 9 INJECTION, SOLUTION INTRAVENOUS at 11:46

## 2017-04-04 RX ADMIN — MORPHINE SULFATE 4 MG: 4 INJECTION INTRAVENOUS at 01:42

## 2017-04-04 RX ADMIN — ASPIRIN 81 MG: 81 TABLET ORAL at 08:14

## 2017-04-04 RX ADMIN — ATORVASTATIN CALCIUM 40 MG: 40 TABLET, FILM COATED ORAL at 02:11

## 2017-04-04 RX ADMIN — NICOTINE 21 MG: 21 PATCH TRANSDERMAL at 05:22

## 2017-04-04 RX ADMIN — TRAZODONE HYDROCHLORIDE 100 MG: 50 TABLET ORAL at 20:05

## 2017-04-04 RX ADMIN — PREDNISONE 5 MG: 5 TABLET ORAL at 08:16

## 2017-04-04 RX ADMIN — SULFASALAZINE 500 MG: 500 TABLET ORAL at 20:05

## 2017-04-04 RX ADMIN — MEGESTROL ACETATE 20 MG: 20 TABLET ORAL at 08:16

## 2017-04-04 RX ADMIN — SULFASALAZINE 500 MG: 500 TABLET ORAL at 08:19

## 2017-04-04 ASSESSMENT — PAIN SCALES - GENERAL
PAINLEVEL_OUTOF10: 8
PAINLEVEL_OUTOF10: 4
PAINLEVEL_OUTOF10: 10
PAINLEVEL_OUTOF10: 8
PAINLEVEL_OUTOF10: 3
PAINLEVEL_OUTOF10: 9

## 2017-04-04 ASSESSMENT — ENCOUNTER SYMPTOMS
NERVOUS/ANXIOUS: 0
WEIGHT LOSS: 0
HEADACHES: 0
NAUSEA: 0
BACK PAIN: 0
WEAKNESS: 0
DIZZINESS: 0
FEVER: 0
ABDOMINAL PAIN: 0
VOMITING: 0
BRUISES/BLEEDS EASILY: 0
MYALGIAS: 1
CHILLS: 0
DEPRESSION: 0
FALLS: 1
SHORTNESS OF BREATH: 0
COUGH: 0

## 2017-04-04 ASSESSMENT — GAIT ASSESSMENTS
DISTANCE (FEET): 4
ASSISTIVE DEVICE: FRONT WHEEL WALKER
DEVIATION: ANTALGIC;DECREASED BASE OF SUPPORT;BRADYKINETIC;SHUFFLED GAIT;DECREASED HEEL STRIKE;DECREASED TOE OFF
GAIT LEVEL OF ASSIST: MINIMAL ASSIST

## 2017-04-04 ASSESSMENT — ACTIVITIES OF DAILY LIVING (ADL): TOILETING: INDEPENDENT

## 2017-04-04 ASSESSMENT — LIFESTYLE VARIABLES
EVER_SMOKED: YES
ALCOHOL_USE: NO
DO YOU DRINK ALCOHOL: NO

## 2017-04-04 NOTE — DISCHARGE PLANNING
Received choice form for hospice services, referral has been sent to [facility] per patient and choice form request at [phone].

## 2017-04-04 NOTE — PROGRESS NOTES
Assumed care, assessment complete. Pt is A & O x 4. Pt medicated for pain per MAR. Fall precautions and appropriate signs in place. Pt oriented to unit routine, call light/phone system and RN extension number provided. Pt educated regarding fall precautions. Bed alarm in use. Pt denies any additional needs at this time. Call light within reach. UA collected.

## 2017-04-04 NOTE — ED NOTES
Pt assisted onto bedpan with two RN assist. With movement has severe pain and is requesting more pain medication. ERP notified. Will medicated accordingly.

## 2017-04-04 NOTE — PROGRESS NOTES
Hospital Medicine Interval Note  Date of Service:  4/4/2017    Chief Complaint  71 y.o.-year-old female admitted 4/3/2017 with ground level fall resulting in sacral alae fractures.    Interval Problem Update  Patient says she doesn't want physical therapy,  at bedside states he can't care for her currently as she can't even get up on her own  C/o low back pain.    Consultants/Specialty  None.    Disposition  Skilled versus home depending upon patient's willingness.     Review of Systems   Constitutional: Negative for fever, chills, weight loss and malaise/fatigue.   Respiratory: Negative for cough and shortness of breath.    Cardiovascular: Negative for chest pain and leg swelling.   Gastrointestinal: Negative for nausea, vomiting and abdominal pain.   Genitourinary: Negative.  Negative for dysuria.   Musculoskeletal: Positive for myalgias, joint pain and falls. Negative for back pain.   Skin: Negative for rash.   Neurological: Negative for dizziness, weakness and headaches.   Endo/Heme/Allergies: Negative.  Does not bruise/bleed easily.   Psychiatric/Behavioral: Negative for depression. The patient is not nervous/anxious.    All other systems reviewed and are negative.     Physical Exam Laboratory/Imaging   Filed Vitals:    04/04/17 0144 04/04/17 0217 04/04/17 0345 04/04/17 0800   BP: 160/72 153/61 145/60 162/72   Pulse: 80  74 81   Temp: 37.4 °C (99.4 °F)  37.5 °C (99.5 °F) 37.8 °C (100 °F)   Resp: 16  16 18   Height:       Weight:       SpO2: 90%  95% 93%     Physical Exam   Constitutional: She appears well-developed and well-nourished. No distress.   HENT:   Nose: Nose normal.   Mouth/Throat: Oropharynx is clear and moist. No oropharyngeal exudate.   Eyes: Conjunctivae are normal. Right eye exhibits no discharge. Left eye exhibits no discharge. No scleral icterus.   Neck: No JVD present. No tracheal deviation present.   Cardiovascular: Normal rate, regular rhythm and normal heart sounds.     Pulmonary/Chest: Effort normal and breath sounds normal. No stridor. No respiratory distress.   Abdominal: Soft. Bowel sounds are normal. She exhibits no distension. There is no tenderness.   Musculoskeletal: She exhibits tenderness (sacral area). She exhibits no edema.   Neurological: She is alert. No cranial nerve deficit. She exhibits normal muscle tone.   Skin: Skin is warm and dry. She is not diaphoretic. No pallor.   Psychiatric: She has a normal mood and affect. Her behavior is normal.   Nursing note and vitals reviewed.   Lab Results   Component Value Date/Time    WBC 7.8 04/04/2017 02:54 AM    HEMOGLOBIN 11.0* 04/04/2017 02:54 AM    HEMATOCRIT 36.4* 04/04/2017 02:54 AM    PLATELET COUNT 182 04/04/2017 02:54 AM     Lab Results   Component Value Date/Time    SODIUM 139 04/04/2017 02:54 AM    POTASSIUM 3.5* 04/04/2017 02:54 AM    CHLORIDE 110 04/04/2017 02:54 AM    CO2 22 04/04/2017 02:54 AM    GLUCOSE 91 04/04/2017 02:54 AM    BUN 18 04/04/2017 02:54 AM    CREATININE 0.63 04/04/2017 02:54 AM    GLOM FILT RATE, EST >59 02/28/2011 09:00 AM      Assessment/Plan    Rheumatoid arthritis (CMS-HCC)  Assessment & Plan  With chronic pain  Steroid dependent continue daily prednisone  Continue sulfasalazine    Postmenopausal osteoporosis  Assessment & Plan  History of compression fractures now with sacral fractures  Also on chronic steroids for RA    Chronic use of opiate for therapeutic purpose  Assessment & Plan  Chronic back pain  Has agreement with Dr. Padilla, no discrepancies    Fall  Assessment & Plan  Ground level mechanical fall at home    Sacral fracture, closed (CMS-HCC)  Assessment & Plan  PT and OT evaluate and treat  Consider skilled if unable to recover here enough to go home  Pain management  Patient stating she is unwilling to cooperate with physical therapy but  stating to her he cannot care for her in her current state. Counseled patient.       Radiology images reviewed, Labs reviewed and  Medications reviewed  Jason catheter: No Jason        DVT prophylaxis - mechanical: SCDs

## 2017-04-04 NOTE — H&P
CHIEF COMPLAINT:  Fall with pain.    PRIMARY MEDICAL PHYSICIAN:  Mariella Padilla M.D.    HISTORY OF PRESENT ILLNESS:  This is a 71-year-old female with multiple   medical issues who comes in with intractable pain after a ground level fall   today.  The patient states that she was getting ready to feed her birds when   her legs became weak and she felt on her left side.  She came in with   complaints of significant left hip pain.  She was unable to get up on her own   and her  found her lying in front of the refrigerator in the kitchen.    She states that she did not trip on anything and she landed on a ceramic   tile surface.  She denies any preceding lightheadedness, dizziness, shortness   of breath, or chest pain.  She denies any head trauma or loss of   consciousness.  Prior to this, she has been in her usual state of health,   which is poor.  She has had rhinorrhea.  She reports chronic fevers and   chills.  She denies any nausea or vomiting.  She denies any numbness or   tingling to any portion of her body.  She denies any new urinary incontinence,   although she does have chronic urge incontinence.  During assessment in the   ER, imaging of the left hip was obtained which showed no evidence of fracture.    She still is in significant amount of pain and is unable to ambulate   secondary to this.  Therefore, she will be admitted to the hospital for   symptomatic management and additional workup.    REVIEW OF SYSTEMS:  A full review of systems was completed and all pertinent   positives and negatives are included in the HPI above.    PAST MEDICAL HISTORY:  1.  Hypertension.  2.  History of CVA and TIA.  3.  Carotid artery stenosis.  4.  Rheumatoid arthritis.  5.  Fibromyalgia.  6.  Hyperlipidemia.    PAST SURGICAL HISTORY:  1.  Cholecystectomy.  2.  Hysterectomy  3.  Carotid endarterectomy.  4.  Cataract surgery.  5.  Hernia repair.  6.  Cystoscopy.  7.  Rectocele.  8.  Thrombectomy.    SOCIAL HISTORY:   The patient is an active tobacco smoker and carries a   52-pack-year history.  She denies any alcohol or illicit drugs.  She lives   with her .    FAMILY HISTORY:  Mother with hypertension and hyperlipidemia.  Father with   lung disease and heart disease.    ALLERGIES:  ____ HYDROCHLOROTHIAZIDE, IMURAN, NAPROXEN, AZATHIOPRINE, RELAFEN,   AND TAPE.    HOME MEDICATIONS:  1.  Plavix 75 mg p.o. daily.  2.  Norco 10/325 p.o. q. 4 hours p.r.n. for moderate-to-severe pain.  3.  Megace.  4.  Mirtazapine 15 mg p.o. at bedtime.  5.  Lipitor 40 mg p.o. daily.  6.  ____ mg p.o. daily.  7.  Ativan 0.5 mg p.o. b.i.d.  8.  Prinivil 40 mg p.o. daily.  9.  Trazodone 100 mg p.o. at bedtime.  10.  Potassium.  11.  Azulfidine 500 mg p.o. b.i.d.  12.  Diltiazem 180 mg p.o. daily.  13.  Aspirin 81 mg p.o. daily.  14.  Folic acid.    PHYSICAL EXAMINATION:  VITAL SIGNS:  Temperature 99.7, heart rate 70, respirations 16, blood pressure   130/59, and patient is saturating at 96% on room air.  GENERAL:  This is an elderly and frail white female who appears older than her   stated age.  She is cachectic.  HEENT:  Normocephalic and atraumatic.  EOMI, moist mucous membranes.  NECK:  Supple.  There is no JVD.  There is no supraclavicular adenopathy.  CARDIOVASCULAR:  Positive S1, S2, regular rate and rhythm.  No murmurs, rubs,   or gallops appreciated.  PULMONARY:  Clear to auscultation bilaterally.  No wheezes, rubs, or rhonchi   heard.  GASTROINTESTINAL:  Soft, nontender, and nondistended.  Positive bowel sounds.    No hepatosplenomegaly.  EXTREMITIES:  Warm and well-perfused:  No clubbing, cyanosis, or edema.    Capillary refill is less than 3 seconds.  Distal pulses are intact.  NEUROLOGICAL:  A and O x3.  Cranial nerves II-XII grossly intact.    ASSESSMENT AND PLAN:  This is a 71-year-old female who presents with pain   after a ground level fall.  She has no evidence of a hip fracture on   radiographic imagings.  However, her pain is  intractable at this point and   she is unable to ambulate secondary to this.  She will be admitted for further   workup and treatment.  1.  Intractable pain after a fall:  I am continuing on her home medications   for her chronic pain.  I will add IV pain medications for breakthrough.  I   will also add Flexeril to see, if this helps with muscle relaxation.  I have   ordered a physical therapy and occupational therapy consultation.  There is no   evidence of fractures on the hip survey.  However, she does carry a history   of compression fractures and I will check a CT of the L-spine to assess for   any new fractures as she does report   lower back pain.  2.  Hyperlipidemia:  Continue home aspirin and Lipitor.  3.  Carotid artery stenosis.  Continue home Plavix.  4.  Hypertension.  Continue home Prinivil and diltiazem.  5.  Active tobacco abuse:  Greater than 3 minutes were spent at bedside and in   tobacco cessation counseling.  The patient has attempted to cut down, but he   has not yet successfully quit.  I have offered a nicotine patch and she has   accepted.    DISPOSITION:  Medical floor.    PROPHYLAXIS:  Sequential compression devices for DVT prophylaxis, no PPI   indicated, stool softeners were ordered.    CODE:  Full.       ____________________________________     MD FELISHA BURROUGHS / NTS    DD:  04/04/2017 00:21:03  DT:  04/04/2017 01:04:50    D#:  [id number]  Job#:  [id number]

## 2017-04-04 NOTE — CARE PLAN
Problem: Safety  Goal: Will remain free from falls  Intervention: Assess risk factors for falls  Fall measures in place. Bed alarm is on, call light within reach, personal belongings close-by, bed in lowest position, IV pole on same side of bathroom, upper bed rails up, hourly rounding in place. Will continue to monitor pt for safety.       Problem: Pain Management  Goal: Pain level will decrease to patient’s comfort goal  Outcome: PROGRESSING AS EXPECTED  Intervention: Follow pain managment plan developed in collaboration with patient and Interdisciplinary Team  Pt medicated for 10/10 left hip pain w/ 4 mg morphine IV push, pt states pain decreased to 4/10 w/ pain med.

## 2017-04-04 NOTE — ED NOTES
Pt removed from bed pan.  Being transported to CT; following CT pt is to go to room. Pt stable prior to ED departure on 3L O2 NC.   Report given to Meghan VAZQUEZ.

## 2017-04-04 NOTE — ED NOTES
BIB [facility] with   Chief Complaint   Patient presents with   • T-5000 GLF     lost balance while walking and fell onto LT hip   • Hip Pain     LT hip; + deformity     Pt seen by PCP this am went home and fell onto RT hip while walking around 3pm.   Pt unable to put any weight onto her hip and experiencing severe pain when she moves it.  called EMS to get pt to ER.   CMS intact.

## 2017-04-04 NOTE — ED NOTES
Pt took norco for pain around 5:30 pm with a little water; reports pain without movement 1/10.  Last PO meal this am.

## 2017-04-04 NOTE — ASSESSMENT & PLAN NOTE
PT and OT recommend skilled placement prior to return home;  discussed with patient and definitely cannot care for her yet, daughter at bedside agrees.  Await skilled facility acceptance.  Pain management  She is cooperative with therapies

## 2017-04-04 NOTE — ED NOTES
Med rec updated and complete.  Allergies reviewed.  Pt took all morning doses.  No antibiotics in last 30 days.

## 2017-04-04 NOTE — DIETARY
"Nutrition Services: Poor Oral Intake, Weight Loss, BMI <19 (16.83 kg/m^2)  70 yo female with admit dx: fall, intractable pain  PMH: TIA, RA, Fibromyalgia, Osteoarthritis, Ischemic stroke, Atherosclerosis, Carotid artery stenosis, Rheumatoid arthritis, Vitamin D deficiency, Emphysema of lung.    Diet order: Cardiac  Pertinent Labs: K+ 3.5  Pertinent Meds: megace, remeron, potassium chloride, prednisone.   Fluids: NS infusion at 100 ml/hr  GI: last BM 4/3   WT: 43.1 kg stated weight  HT: 5'3\"  BMI: 16.8 kg/m^2  SKIN: no skin breakdown noted    Patient with noted poor oral intake and unplanned weight loss per nutrition admit screen. The patient also has a low BMI. The patient appears very thin and frail with visible bony prominences and obvious subcutaneous fat loss and muscle wasting. The patient reports that she has lost ~55 lbs since May of last year (37%, SEVERE), after she had a stroke. She states that since then she has no appetite. She denies N/V and reports that she is still having regular bowel movements. She generally eats only 1 meal a day (dinner) and it is usually a small amount. The patient does drink Boost Plus at home and would benefit from receiving them here. Documentation of meals show that the patient consumed % of breakfast. Discussed ways to maximize nutrition with the patient until she started showing visible signs of exhaustion.   PLAN - Nutrition Rep to see patient daily for meal choices. Dietary to provide high protein milkshakes to boost intake.  RECOMMEND - Continue diet per MD; consider liberalizing diet during this time of poor PO as patient will not likely exceed levels of restriction on diet and would benefit from having more food options. Continue appetite stimulant. Please encourage good PO intake and record intake of meals in ADLs. Please obtain scale weights to monitor fluid and nutrition status; documented weight shows it was \"stated.\" RD will continue to monitor.     "

## 2017-04-04 NOTE — RESPIRATORY CARE
COPD EDUCATION by COPD CLINICAL EDUCATOR  4/4/2017 at 6:46 AM by Christen Renae     Patient reviewed by COPD education team. Patient does not qualify for COPD program.

## 2017-04-04 NOTE — THERAPY
"Occupational Therapy Evaluation completed.   Functional Status:  Pt presented to skilled OT services following GLF at home, hx of CVA, per pt resulting in cognitive deficits. Pt performed bed mobility with min a with increased time and rest breaks 2/2 L hip pain, able to doff R sock with increased time seated eob, max a to don/doff socks LLE, h/g seated supervision. Pt limited by pain, decreased oob activity tolerance secondary to above, and impaired balance. Pt would benefit from acute skilled services while in house to maximize pt's participation with ADLs.      Plan of Care: Will benefit from Occupational Therapy 3 times per week  Discharge Recommendations:  Equipment: Will Continue to Assess for Equipment Needs. Post-acute therapy Discharge to a transitional care facility for continued skilled therapy services. and Discharge to home with outpatient or home health for additional skilled therapy services depending on pt's progress with therapy.     See \"Rehab Therapy-Acute\" Patient Summary Report for complete documentation.    "

## 2017-04-04 NOTE — ED PROVIDER NOTES
ED Provider Note    Scribed for Abraham Garcia D.O. by Patricia Arreguin. 4/3/2017  8:24 PM    Primary care provider: Mariella Padilla M.D.  Means of arrival: EMS  History obtained from: patient  History limited by: none    CHIEF COMPLAINT  Chief Complaint   Patient presents with   • T-5000 GLF     lost balance while walking and fell onto LT hip   • Hip Pain     LT hip; + deformity       HPI  Lakisha Bhatti is a 71 y.o. female who presents to the Emergency Department for evaluation of left hip pain. The pt states that at approximately 330pm today, she slipped in her home, and landed on her left hip.  She states she did not have any head injury, and no loc. She denies  Neck/back pain. She has no cp, no sob, and no abdominal pain. She has not been able to walk since the incident, but waited at home to see if she would get any better. She states that she called her family member immediately after her fall. She states she is on plavix, and asa, but no other thinners.     REVIEW OF SYSTEMS  Review of Systems   HENT:        Negative for head injury   Respiratory: Negative for shortness of breath.    Cardiovascular: Negative for chest pain.   Gastrointestinal: Negative for abdominal pain.   Musculoskeletal: Positive for falls. Negative for back pain and neck pain.        Positive for hip pain   Neurological: Negative for loss of consciousness.   All other systems reviewed and are negative.      PAST MEDICAL HISTORY   has a past medical history of TIA (transient ischemic attack); RA (rheumatoid arthritis) (CMS-HCC); Fibromyalgia; Osteoarthritis; Vasculitis of the skin; Acute ischemic stroke (CMS-[facility]) (1/2010); ATHEROSCLEROSIS; Carotid artery stenosis; Rheumatoid arthritis(714.0) (1997); Bleeding disorder (CMS-HCC); History of cerebrovascular accident with residual effects (1/2010); OSTEOPOROSIS; CATARACT; Dyslipidemia, goal LDL below 100; Anesthesia; Vertebral fracture, osteoporotic (CMS-HCC); Vitamin d deficiency;  MEDICAL HOME (10/10/2012); Indigestion; Dental disorder; Hypertension (2015); Blood clotting disorder (CMS-HCC); Breath shortness; Pain; Tubular adenoma of colon; Iliac artery stenosis, right (CMS-HCC); High cholesterol; Emphysema of lung (CMS-HCC); Stroke (CMS-HCC) (2010); and Stroke (CMS-HCC) (2016).    SURGICAL HISTORY   has past surgical history that includes cholecystectomy; vaginal hysterectomy total; carotid endarterectomy (2/18/2010); cataract phaco with iol; incision hernia repair; cystocele repair (1/27/2012); rectocele repair (1/27/2012); recovery (12/10/2015); thrombectomy (Left, 12/22/2015); gastroscopy-endo (N/A, 1/18/2016); colonoscopy - endo (N/A, 1/18/2016); and gastroscopy with biopsy (N/A, 1/18/2016).    SOCIAL HISTORY  Social History   Substance Use Topics   • Smoking status: Current Every Day Smoker -- 1.00 packs/day for 52 years     Types: Cigarettes   • Smokeless tobacco: Never Used      Comment: she is working on reducing and weaning off, discussed   • Alcohol Use: No      History   Drug Use No       FAMILY HISTORY  Family History   Problem Relation Age of Onset   • Non-contributory Mother      emphysema   • Hypertension Mother    • Hyperlipidemia Mother    • Stroke Father      brain aneurysm   • Lung Disease Father      chronic bronchitis   • Heart Disease Father      MI, heart aneurysm       CURRENT MEDICATIONS  Home Medications     Reviewed by Pedrito Shearer R.N. (Registered Nurse) on 04/03/17 at 2012  Med List Status: Partial    Medication Last Dose Status    alendronate (FOSAMAX) 70 MG Tab  Active    aspirin EC (ECOTRIN) 81 MG Tablet Delayed Response 12/19/2016 Active    atorvastatin (LIPITOR) 40 MG Tab  Active    clopidogrel (PLAVIX) 75 MG Tab  Active    diltiazem CD (CARDIZEM CD) 180 MG CAPSULE SR 24 HR 12/23/2016 Active    folic acid (FOLVITE) 1 MG TABS 12/22/2016 Active    hydrocodone/acetaminophen (NORCO)  MG Tab  Active    lisinopril (PRINIVIL, ZESTRIL) 40 MG tablet  "12/22/2016 Active    lorazepam (ATIVAN) 0.5 MG Tab 12/22/2016 Active    megestrol (MEGACE) 20 MG Tab  Active    mirtazapine (REMERON) 15 MG Tab  Active    Misc. Devices Misc \"Not needed anymore\". Active    potassium chloride ER (KLOR-CON) 10 MEQ tablet 12/22/2016 Active    predniSONE (DELTASONE) 5 MG Tab 12/22/2016 Active    sulfaSALAzine (AZULFIDINE) 500 MG Tab 12/22/2016 Active    trazodone (DESYREL) 100 MG Tab 12/22/2016 Active                ALLERGIES  Allergies   Allergen Reactions   • Gold Hives   • Hydrochlorothiazide [Hctz] Itching   • Imuran [Azathioprine Sodium] Vomiting   • Naproxen Itching   • Azathioprine      DOES NOT REMEMBER REACTIOON   • Hydrochlorothiazide    • Naproxen    • Relafen [Nabumetone]      Did not work   • Tape      Paper tape is ok       PHYSICAL EXAM  VITAL SIGNS: /59 mmHg  Pulse 70  Temp(Src) 37.6 °C (99.7 °F)  Resp 16  Ht 1.6 m (5' 2.99\")  Wt 43.092 kg (95 lb)  BMI 16.83 kg/m2  SpO2 96%    Constitutional: Well developed, well nourished. No acute distress.  HEENT: Normocephalic, atraumatic. Posterior pharynx clear and moist.  Eyes:  EOMI. Normal sclera.  Neck: Supple, Full range of motion, nontender.  Chest/Pulmonary: clear to ausculation. Symmetrical expansion.   Cardio: Regular rate and rhythm with no murmur.   Abdomen: Soft, nontender. No peritoneal signs. No guarding. No palpable masses.  Back: No CVA tenderness, nontender midline, no step offs.  Musculoskeletal: left tenderness over the greater trochanter.  Non tender knee.  N/v intact distally.  No edema. No erythema.  Remaining ext are non tender.   Neuro: Clear speech, appropriate, cooperative, cranial nerves II-XII grossly intact.  Psych: Normal mood and affect    DIAGNOSTIC STUDIES / PROCEDURES  LABS  Results for orders placed or performed during the hospital encounter of 04/03/17   CBC WITH DIFFERENTIAL   Result Value Ref Range    WBC 9.7 4.8 - 10.8 K/uL    RBC 3.90 (L) 4.20 - 5.40 M/uL    Hemoglobin 11.6 (L) " 12.0 - 16.0 g/dL    Hematocrit 36.9 (L) 37.0 - 47.0 %    MCV 94.6 81.4 - 97.8 fL    MCH 29.7 27.0 - 33.0 pg    MCHC 31.4 (L) 33.6 - 35.0 g/dL    RDW 57.3 (H) 35.9 - 50.0 fL    Platelet Count 195 164 - 446 K/uL    MPV 10.2 9.0 - 12.9 fL    Neutrophils-Polys 71.20 44.00 - 72.00 %    Lymphocytes 16.90 (L) 22.00 - 41.00 %    Monocytes 8.50 0.00 - 13.40 %    Eosinophils 2.10 0.00 - 6.90 %    Basophils 0.70 0.00 - 1.80 %    Immature Granulocytes 0.60 0.00 - 0.90 %    Nucleated RBC 0.00 /100 WBC    Neutrophils (Absolute) 6.89 2.00 - 7.15 K/uL    Lymphs (Absolute) 1.64 1.00 - 4.80 K/uL    Monos (Absolute) 0.82 0.00 - 0.85 K/uL    Eos (Absolute) 0.20 0.00 - 0.51 K/uL    Baso (Absolute) 0.07 0.00 - 0.12 K/uL    Immature Granulocytes (abs) 0.06 0.00 - 0.11 K/uL    NRBC (Absolute) 0.00 K/uL   COMP METABOLIC PANEL   Result Value Ref Range    Sodium 139 135 - 145 mmol/L    Potassium 3.4 (L) 3.6 - 5.5 mmol/L    Chloride 111 96 - 112 mmol/L    Co2 20 20 - 33 mmol/L    Anion Gap 8.0 0.0 - 11.9    Glucose 98 65 - 99 mg/dL    Bun 24 (H) 8 - 22 mg/dL    Creatinine 0.58 0.50 - 1.40 mg/dL    Calcium 9.0 8.5 - 10.5 mg/dL    AST(SGOT) 17 12 - 45 U/L    ALT(SGPT) 12 2 - 50 U/L    Alkaline Phosphatase 46 30 - 99 U/L    Total Bilirubin 0.4 0.1 - 1.5 mg/dL    Albumin 3.6 3.2 - 4.9 g/dL    Total Protein 6.4 6.0 - 8.2 g/dL    Globulin 2.8 1.9 - 3.5 g/dL    A-G Ratio 1.3 g/dL   PROTHROMBIN TIME   Result Value Ref Range    PT 13.9 12.0 - 14.6 sec    INR 1.04 0.87 - 1.13   ESTIMATED GFR   Result Value Ref Range    GFR If African American >60 >60 mL/min/1.73 m 2    GFR If Non African American >60 >60 mL/min/1.73 m 2   All labs reviewed by me.    RADIOLOGY  DX-HIP-UNILATERAL-WITH PELVIS-1 VIEW LEFT   Final Result         1.  No radiographic evidence of acute traumatic injury.   2.  Atherosclerosis        The radiologist's interpretation of all radiological studies have been reviewed by me.    COURSE & MEDICAL DECISION MAKING  Pertinent Labs & Imaging  studies reviewed. (See chart for details)    Differential Diagnosis includes but not limited to: fracture, strain.    8:24 PM - Patient seen and examined at bedside. Patient will be treated with 4mg morphine, 4mg zofran. Ordered hip XR, cbc with differential, comp metabolic panel, and other labs to evaluate her symptoms.     11:21 PM  Dr. Jean Baptiste will admit for pain control.  The pt has continued pain, but again, a negative x ray.      DISPOSITION:  Patient will be discharged home in stable condition.    FOLLOW UP:      OUTPATIENT MEDICATIONS:    FINAL IMPRESSION  1. Pain of left hip joint         Patricia SUNSHINE (Jennifer), am scribing for, and in the presence of, Abraham Garcia D.O.    Electronically signed by: Patricia Arreguin (Jennifer), 4/3/2017    Abraham SUNSHINE D.O. personally performed the services described in this documentation, as scribed by Patricia Arreguin in my presence, and it is both accurate and complete.    The note accurately reflects work and decisions made by me.  Abraham Garcia  4/4/2017  4:22 AM

## 2017-04-04 NOTE — PROGRESS NOTES
Pt up to floor via gurney, pt transferred to bed w/ slide board, pt is AAOx4,  @ bedside, pt is tearful and in 10/10 aching/stabbing left hip pain, pt medicated for pain per MAR, admit profile completed, pt medicated per MAR, fluids administered, 2 RN skin check completed. Fall measures in place. Bed alarm is on, call light within reach, personal belongings close-by, bed in lowest position, IV pole on same side of bathroom, upper bed rails up, hourly rounding in place. Will continue to monitor pt for safety.

## 2017-04-04 NOTE — THERAPY
"Physical Therapy Evaluation completed.   Bed Mobility:  Supine to Sit:  (Found EOB)  Transfers: Sit to Stand: Minimal Assist  Gait: Level Of Assist: Minimal Assist with Front-Wheel Walker     Side steps at EOB only.Difficulty with fully shifting weight to the L LE due to pain  Plan of Care: Will benefit from Physical Therapy 3 times per week and Plan to complete next treatment by Thursday 4/6  Discharge Recommendations: Equipment: Will Continue to Assess for Equipment Needs. Post-acute therapy Discharge to a transitional care facility for continued skilled therapy services.    Pt would benefit from post acute transitional care following DC due to decreased functional mobility, decreased functional strength and pain. Please consider physiatry consult to determine if pt appropriate for inpatient acute rehab.     See \"Rehab Therapy-Acute\" Patient Summary Report for complete documentation.     "

## 2017-04-04 NOTE — CARE PLAN
Problem: Nutritional:  Goal: Achieve adequate nutritional intake  Patient will consistently consume >50% of meals  Outcome: NOT MET

## 2017-04-05 PROBLEM — E87.6 HYPOKALEMIA: Status: ACTIVE | Noted: 2017-04-05

## 2017-04-05 PROCEDURE — 700111 HCHG RX REV CODE 636 W/ 250 OVERRIDE (IP): Performed by: HOSPITALIST

## 2017-04-05 PROCEDURE — 700102 HCHG RX REV CODE 250 W/ 637 OVERRIDE(OP): Performed by: HOSPITALIST

## 2017-04-05 PROCEDURE — A9270 NON-COVERED ITEM OR SERVICE: HCPCS | Performed by: HOSPITALIST

## 2017-04-05 PROCEDURE — 99226 PR SUBSEQUENT OBSERVATION CARE,LEVEL III: CPT | Performed by: HOSPITALIST

## 2017-04-05 PROCEDURE — G0378 HOSPITAL OBSERVATION PER HR: HCPCS

## 2017-04-05 PROCEDURE — 700101 HCHG RX REV CODE 250: Performed by: HOSPITALIST

## 2017-04-05 PROCEDURE — 700105 HCHG RX REV CODE 258: Performed by: HOSPITALIST

## 2017-04-05 RX ORDER — POTASSIUM CHLORIDE 750 MG/1
20 TABLET, FILM COATED, EXTENDED RELEASE ORAL DAILY
Status: DISCONTINUED | OUTPATIENT
Start: 2017-04-06 | End: 2017-04-06 | Stop reason: HOSPADM

## 2017-04-05 RX ADMIN — MIRTAZAPINE 15 MG: 15 TABLET, FILM COATED ORAL at 20:21

## 2017-04-05 RX ADMIN — PREDNISONE 5 MG: 5 TABLET ORAL at 09:39

## 2017-04-05 RX ADMIN — OXYCODONE HYDROCHLORIDE 10 MG: 10 TABLET ORAL at 08:21

## 2017-04-05 RX ADMIN — SODIUM CHLORIDE 1000 ML: 9 INJECTION, SOLUTION INTRAVENOUS at 16:50

## 2017-04-05 RX ADMIN — OXYCODONE HYDROCHLORIDE 10 MG: 10 TABLET ORAL at 14:43

## 2017-04-05 RX ADMIN — TRAZODONE HYDROCHLORIDE 100 MG: 50 TABLET ORAL at 20:22

## 2017-04-05 RX ADMIN — DILTIAZEM HYDROCHLORIDE 180 MG: 180 CAPSULE, COATED, EXTENDED RELEASE ORAL at 09:38

## 2017-04-05 RX ADMIN — ATORVASTATIN CALCIUM 40 MG: 40 TABLET, FILM COATED ORAL at 20:21

## 2017-04-05 RX ADMIN — MEGESTROL ACETATE 20 MG: 20 TABLET ORAL at 09:40

## 2017-04-05 RX ADMIN — CYCLOBENZAPRINE HYDROCHLORIDE 10 MG: 10 TABLET, FILM COATED ORAL at 20:21

## 2017-04-05 RX ADMIN — CLOPIDOGREL 75 MG: 75 TABLET, FILM COATED ORAL at 09:40

## 2017-04-05 RX ADMIN — OXYCODONE HYDROCHLORIDE 10 MG: 10 TABLET ORAL at 17:47

## 2017-04-05 RX ADMIN — POTASSIUM CHLORIDE 10 MEQ: 750 TABLET, FILM COATED, EXTENDED RELEASE ORAL at 09:40

## 2017-04-05 RX ADMIN — SULFASALAZINE 500 MG: 500 TABLET ORAL at 09:39

## 2017-04-05 RX ADMIN — CYCLOBENZAPRINE HYDROCHLORIDE 10 MG: 10 TABLET, FILM COATED ORAL at 12:44

## 2017-04-05 RX ADMIN — OXYCODONE HYDROCHLORIDE 10 MG: 10 TABLET ORAL at 04:59

## 2017-04-05 RX ADMIN — NICOTINE 21 MG: 21 PATCH TRANSDERMAL at 04:59

## 2017-04-05 RX ADMIN — ASPIRIN 81 MG: 81 TABLET ORAL at 09:38

## 2017-04-05 RX ADMIN — LISINOPRIL 40 MG: 20 TABLET ORAL at 09:39

## 2017-04-05 RX ADMIN — SULFASALAZINE 500 MG: 500 TABLET ORAL at 20:21

## 2017-04-05 ASSESSMENT — ENCOUNTER SYMPTOMS
VOMITING: 0
CHILLS: 0
NERVOUS/ANXIOUS: 0
BACK PAIN: 0
WEAKNESS: 1
MYALGIAS: 1
DIZZINESS: 0
DEPRESSION: 0
FALLS: 1
FEVER: 0
HEADACHES: 0
ABDOMINAL PAIN: 0
NAUSEA: 0
BRUISES/BLEEDS EASILY: 0
COUGH: 0
SHORTNESS OF BREATH: 0
WEIGHT LOSS: 0

## 2017-04-05 ASSESSMENT — PAIN SCALES - GENERAL
PAINLEVEL_OUTOF10: 9
PAINLEVEL_OUTOF10: 8
PAINLEVEL_OUTOF10: 9
PAINLEVEL_OUTOF10: 8
PAINLEVEL_OUTOF10: 5
PAINLEVEL_OUTOF10: 7
PAINLEVEL_OUTOF10: 4
PAINLEVEL_OUTOF10: 2
PAINLEVEL_OUTOF10: 8
PAINLEVEL_OUTOF10: 3

## 2017-04-05 NOTE — DISCHARGE PLANNING
Received choice form for SNF services, referral has been sent to [facility] per patient and choice form request at [phone].

## 2017-04-05 NOTE — DISCHARGE PLANNING
Call received from [facility], spoke to Todd. Patient has been accepted for admissions.

## 2017-04-05 NOTE — CARE PLAN
Problem: Discharge Barriers/Planning  Goal: Patient’s continuum of care needs will be met  Intervention: Collaborate with Transitional Care Team and Interdisciplinary Team to meet discharge needs  Possible discharge to SNF after PT/OT evaluation, pt  unable to take care of her mainly concerned on mobility.      Problem: Pain Management  Goal: Pain level will decrease to patient’s comfort goal  Intervention: Follow pain managment plan developed in collaboration with patient and Interdisciplinary Team  Pain management for her left hip pain due to fracture, assisted turning and sitting at the edge of the bed.

## 2017-04-05 NOTE — DISCHARGE PLANNING
Request from DAVID Lin to set up transportation with [facility], call was placed and voicemail was left at [phone].

## 2017-04-05 NOTE — CARE PLAN
Problem: Discharge Barriers/Planning  Goal: Patient’s continuum of care needs will be met  Intervention: Collaborate with Transitional Care Team and Interdisciplinary Team to meet discharge needs  Pt to be discharged to [facility] skilled pending available bed.       Problem: Pain Management  Goal: Pain level will decrease to patient’s comfort goal  Intervention: Follow pain managment plan developed in collaboration with patient and Interdisciplinary Team  Left hip pain managed with PRN medications, pt at comfort goal. Will continue to assess.

## 2017-04-05 NOTE — DISCHARGE PLANNING
Per GEORGE Taylor, pt's  would like to speak with SW regarding SNF choice.  SW met with pt and pt's , Nico and went over SNF choice.  Both chose [facility], #2 [facility], and #3 [facility].  DAVID faxed choice to [facility] Hinoj[facility].

## 2017-04-05 NOTE — PROGRESS NOTES
Received sitting at the edge of the bed,  at the bed side, due med given as ordered, assisted repositioning in the bed, call light within reach, bed alarm in placed, needs attended, will continue to monitor.

## 2017-04-05 NOTE — DISCHARGE PLANNING
Call received from [facility], spoke to Phani. [facility] accepted patient pending bed available. Currently there are no bed available.

## 2017-04-05 NOTE — PROGRESS NOTES
Hospital Medicine Interval Note  Date of Service:  4/5/2017    Chief Complaint  71 y.o.-year-old female admitted 4/3/2017 with ground level fall resulting in sacral alae fractures.    Interval Problem Update  Worked well with PT, was scared due to prior experiences  Daughter and  at bedside  Pain controlled  Very weak    Consultants/Specialty  None.    Disposition  Skilled nursing facility. Referrals made. Medically clear awaiting acceptance discussed with Delia      Review of Systems   Constitutional: Positive for malaise/fatigue. Negative for fever, chills and weight loss.   Respiratory: Negative for cough and shortness of breath.    Cardiovascular: Negative for chest pain and leg swelling.   Gastrointestinal: Negative for nausea, vomiting and abdominal pain.   Genitourinary: Negative.  Negative for dysuria.   Musculoskeletal: Positive for myalgias, joint pain and falls. Negative for back pain.   Skin: Negative for rash.   Neurological: Positive for weakness. Negative for dizziness and headaches.   Endo/Heme/Allergies: Negative.  Does not bruise/bleed easily.   Psychiatric/Behavioral: Negative for depression. The patient is not nervous/anxious.    All other systems reviewed and are negative.     Physical Exam Laboratory/Imaging   Filed Vitals:    04/05/17 0400 04/05/17 0800 04/05/17 1130 04/05/17 1131   BP: 156/84 164/65 160/66 136/94   Pulse: 88 90 82 84   Temp: 37.2 °C (98.9 °F) 37.1 °C (98.7 °F) 37.7 °C (99.8 °F)    Resp: 16 18 16    Height:       Weight:       SpO2: 92% 97% 92%      Physical Exam   Constitutional: She appears well-developed and well-nourished. No distress.   HENT:   Nose: Nose normal.   Mouth/Throat: Oropharynx is clear and moist. No oropharyngeal exudate.   Eyes: Conjunctivae are normal. Right eye exhibits no discharge. Left eye exhibits no discharge. No scleral icterus.   Neck: No JVD present. No tracheal deviation present.   Cardiovascular: Normal rate, regular rhythm and  normal heart sounds.    Pulmonary/Chest: Effort normal and breath sounds normal. No stridor. No respiratory distress.   Abdominal: Soft. Bowel sounds are normal. She exhibits no distension. There is no tenderness.   Musculoskeletal: She exhibits tenderness (sacral area). She exhibits no edema.   Neurological: She is alert. No cranial nerve deficit. She exhibits normal muscle tone.   Skin: Skin is warm and dry. She is not diaphoretic. No pallor.   Psychiatric: She has a normal mood and affect. Her behavior is normal.   Nursing note and vitals reviewed.   Lab Results   Component Value Date/Time    WBC 7.8 04/04/2017 02:54 AM    HEMOGLOBIN 11.0* 04/04/2017 02:54 AM    HEMATOCRIT 36.4* 04/04/2017 02:54 AM    PLATELET COUNT 182 04/04/2017 02:54 AM     Lab Results   Component Value Date/Time    SODIUM 139 04/04/2017 02:54 AM    POTASSIUM 3.5* 04/04/2017 02:54 AM    CHLORIDE 110 04/04/2017 02:54 AM    CO2 22 04/04/2017 02:54 AM    GLUCOSE 91 04/04/2017 02:54 AM    BUN 18 04/04/2017 02:54 AM    CREATININE 0.63 04/04/2017 02:54 AM    GLOM FILT RATE, EST >59 02/28/2011 09:00 AM      Assessment/Plan    Rheumatoid arthritis (CMS-HCC)  Assessment & Plan  With chronic pain  Steroid dependent continue daily prednisone  Continue sulfasalazine    Postmenopausal osteoporosis  Assessment & Plan  History of compression fractures now with sacral fractures  Also on chronic steroids for RA    Chronic use of opiate for therapeutic purpose  Assessment & Plan  Chronic back pain  Has agreement with Dr. Padilla, no discrepancies    Fall  Assessment & Plan  Ground level mechanical fall at home    Sacral fracture, closed (CMS-HCC)  Assessment & Plan  PT and OT recommend skilled placement prior to return home;  discussed with patient and definitely cannot care for her yet, daughter at bedside agrees.  Await skilled facility acceptance.  Pain management  She is cooperative with therapies    Hypokalemia  Assessment & Plan  Mild, oral  replacement       Radiology images reviewed, Labs reviewed and Medications reviewed  Jason catheter: No Jason        DVT prophylaxis - mechanical: SCDs

## 2017-04-06 ENCOUNTER — APPOINTMENT (OUTPATIENT)
Dept: RADIOLOGY | Facility: MEDICAL CENTER | Age: 72
End: 2017-04-06
Attending: HOSPITALIST
Payer: MEDICARE

## 2017-04-06 VITALS
RESPIRATION RATE: 20 BRPM | OXYGEN SATURATION: 100 % | WEIGHT: 95 LBS | SYSTOLIC BLOOD PRESSURE: 127 MMHG | BODY MASS INDEX: 16.83 KG/M2 | HEIGHT: 63 IN | DIASTOLIC BLOOD PRESSURE: 61 MMHG | HEART RATE: 61 BPM | TEMPERATURE: 98 F

## 2017-04-06 PROCEDURE — 97110 THERAPEUTIC EXERCISES: CPT

## 2017-04-06 PROCEDURE — 71010 DX-CHEST-PORTABLE (1 VIEW): CPT

## 2017-04-06 PROCEDURE — A9270 NON-COVERED ITEM OR SERVICE: HCPCS | Performed by: HOSPITALIST

## 2017-04-06 PROCEDURE — G0378 HOSPITAL OBSERVATION PER HR: HCPCS

## 2017-04-06 PROCEDURE — 700102 HCHG RX REV CODE 250 W/ 637 OVERRIDE(OP): Performed by: HOSPITALIST

## 2017-04-06 PROCEDURE — 97530 THERAPEUTIC ACTIVITIES: CPT

## 2017-04-06 PROCEDURE — 99217 PR OBSERVATION CARE DISCHARGE: CPT | Performed by: HOSPITALIST

## 2017-04-06 PROCEDURE — 97112 NEUROMUSCULAR REEDUCATION: CPT

## 2017-04-06 PROCEDURE — 97535 SELF CARE MNGMENT TRAINING: CPT

## 2017-04-06 PROCEDURE — 700111 HCHG RX REV CODE 636 W/ 250 OVERRIDE (IP): Performed by: HOSPITALIST

## 2017-04-06 PROCEDURE — 700101 HCHG RX REV CODE 250: Performed by: HOSPITALIST

## 2017-04-06 RX ORDER — TIZANIDINE 4 MG/1
4 TABLET ORAL EVERY 6 HOURS PRN
Qty: 30 TAB | Refills: 3 | Status: SHIPPED | DISCHARGE
Start: 2017-04-06 | End: 2017-07-03

## 2017-04-06 RX ADMIN — CLOPIDOGREL 75 MG: 75 TABLET, FILM COATED ORAL at 08:20

## 2017-04-06 RX ADMIN — PREDNISONE 5 MG: 5 TABLET ORAL at 08:20

## 2017-04-06 RX ADMIN — STANDARDIZED SENNA CONCENTRATE AND DOCUSATE SODIUM 2 TABLET: 8.6; 5 TABLET, FILM COATED ORAL at 08:20

## 2017-04-06 RX ADMIN — POTASSIUM CHLORIDE 20 MEQ: 750 TABLET, FILM COATED, EXTENDED RELEASE ORAL at 08:20

## 2017-04-06 RX ADMIN — SULFASALAZINE 500 MG: 500 TABLET ORAL at 08:21

## 2017-04-06 RX ADMIN — OXYCODONE HYDROCHLORIDE 10 MG: 10 TABLET ORAL at 15:34

## 2017-04-06 RX ADMIN — NICOTINE 21 MG: 21 PATCH TRANSDERMAL at 04:05

## 2017-04-06 RX ADMIN — OXYCODONE HYDROCHLORIDE 10 MG: 10 TABLET ORAL at 04:05

## 2017-04-06 RX ADMIN — DILTIAZEM HYDROCHLORIDE 180 MG: 180 CAPSULE, COATED, EXTENDED RELEASE ORAL at 09:51

## 2017-04-06 RX ADMIN — OXYCODONE HYDROCHLORIDE 10 MG: 10 TABLET ORAL at 08:20

## 2017-04-06 RX ADMIN — MEGESTROL ACETATE 20 MG: 20 TABLET ORAL at 08:21

## 2017-04-06 RX ADMIN — LISINOPRIL 40 MG: 20 TABLET ORAL at 08:20

## 2017-04-06 RX ADMIN — OXYCODONE HYDROCHLORIDE 10 MG: 10 TABLET ORAL at 11:44

## 2017-04-06 RX ADMIN — ASPIRIN 81 MG: 81 TABLET ORAL at 08:20

## 2017-04-06 ASSESSMENT — PAIN SCALES - GENERAL
PAINLEVEL_OUTOF10: 5
PAINLEVEL_OUTOF10: 8
PAINLEVEL_OUTOF10: 10
PAINLEVEL_OUTOF10: 8
PAINLEVEL_OUTOF10: 8
PAINLEVEL_OUTOF10: 4
PAINLEVEL_OUTOF10: 8
PAINLEVEL_OUTOF10: 7
PAINLEVEL_OUTOF10: 7

## 2017-04-06 ASSESSMENT — GAIT ASSESSMENTS
DISTANCE (FEET): 4
ASSISTIVE DEVICE: FRONT WHEEL WALKER
GAIT LEVEL OF ASSIST: MODERATE ASSIST

## 2017-04-06 NOTE — THERAPY
"Occupational Therapy Evaluation completed.   Functional Status:  Pt seen for OT tx today, functional mobility continues to be limited by LLE pain, diffculties wt-shifitng with attempts of  side-stepping. Pt performed STS from a recliner with min a and cues for proper hand positioning, pt able to position female urinal and hold in place in standing, required assist with gown management, was able to maintain balance with unilateral support from FWW, able to tolerate about 2mins x1, ~5mins static standing x2, ~2mins for rest of the stand with facilitation of wt-shifting and foot clearance. Pt educated on importance of ROM of BLE in presence of pain to maintain joint integrity and ease of faciliating a muscle that is not tight; stated understanding. Pt performed modified seated B hip flexion/extension x10  Plan of Care: Will benefit from Occupational Therapy 3 times per week  Discharge Recommendations:  Equipment: Will Continue to Assess for Equipment Needs. Post-acute therapy Discharge to a transitional care facility for continued skilled therapy services.    See \"Rehab Therapy-Acute\" Patient Summary Report for complete documentation.    "

## 2017-04-06 NOTE — DISCHARGE PLANNING
Left message for admissions at [facility] to call to reschedule transport to the latest time possible today. Pt is needing to take an Xray. CTTEDMUNDO GRANADOS notified and has been notified [facility] is waiting for a PASRR as well.

## 2017-04-06 NOTE — DISCHARGE PLANNING
Call received from Patricia at [facility], patient transportation has been set up for 1400. Voicemail has been left for DAVID Lin.

## 2017-04-06 NOTE — DISCHARGE INSTRUCTIONS
Discharge Instructions    Discharged to [place] by medical transportation with escort. Discharged via wheelchair, hospital escort: Yes.  Special equipment needed: Wheelchair    Be sure to schedule a follow-up appointment with your primary care doctor or any specialists as instructed.     Discharge Plan:   Diet Plan: Discussed (REgular )  Activity Level: Discussed (as tolerated, weight bearing as tolerated)  Smoking Cessation Offered: Patient Refused  Confirmed Follow up Appointment: Appointment Scheduled  Confirmed Symptoms Management: Discussed  Medication Reconciliation Updated: Yes  Influenza Vaccine Indication: Not indicated: Previously immunized this influenza season and > 8 years of age    I understand that a diet low in cholesterol, fat, and sodium is recommended for good health. Unless I have been given specific instructions below for another diet, I accept this instruction as my diet prescription.   Other diet: Regular      Special Instructions: None    · Is patient discharged on Warfarin / Coumadin?   No     · Is patient Post Blood Transfusion?  No    Depression / Suicide Risk    As you are discharged from this [facility] facility, it is important to learn how to keep safe from harming yourself.    Recognize the warning signs:  · Abrupt changes in personality, positive or negative- including increase in energy   · Giving away possessions  · Change in eating patterns- significant weight changes-  positive or negative  · Change in sleeping patterns- unable to sleep or sleeping all the time   · Unwillingness or inability to communicate  · Depression  · Unusual sadness, discouragement and loneliness  · Talk of wanting to die  · Neglect of personal appearance   · Rebelliousness- reckless behavior  · Withdrawal from people/activities they love  · Confusion- inability to concentrate     If you or a loved one observes any of these behaviors or has concerns about self-harm, here's what you can do:  · Talk about  it- your feelings and reasons for harming yourself  · Remove any means that you might use to hurt yourself (examples: pills, rope, extension cords, firearm)  · Get professional help from the community (Mental Health, Substance Abuse, psychological counseling)  · Do not be alone:Call your Safe Contact- someone whom you trust who will be there for you.  · Call your local CRISIS HOTLINE 626-5447 or 302-762-4577  · Call your local Children's Mobile Crisis Response Team [place] (050) 247-2231 or www.[employer]  · Call the toll free National Suicide Prevention Hotlines   · National Suicide Prevention Lifeline 089-740-YQNN (7708)  · National Hope Line Network 800-SUICIDE (018-0652)

## 2017-04-06 NOTE — PROGRESS NOTES
Care assumed at 0700. Patient sitting in chair. Stood to urinate, c/o pain. Oral oxycodone given per MAR. Patient reported improvement. Plan of care discussed to tx to [place] at 4pm today. Patient denies questions. Chair alarm in place, pad under buttocks for support. Instructed to shift weight frequently to prevent redness or skin breakdown. Patient call light in reach. WIll continue to monitor.

## 2017-04-06 NOTE — DISCHARGE SUMMARY
CHIEF COMPLAINT ON ADMISSION  Chief Complaint   Patient presents with   • T-5000 GLF     lost balance while walking and fell onto LT hip   • Hip Pain     LT hip; + deformity       CODE STATUS  Full Code    HPI & HOSPITAL COURSE  This is a 71 y.o. year old female here with a ground level fall resulting in sacral ala fracture. The patient has a history of osteoporosis with a history of smoking and steroid dependent rheumatoid arthritis. She has had spinal compression fractures in the past but none with this particular fall. She is medically stable but unable to ambulate and complete ADLs to the point that she could be discharged home safely. Physical and occupational therapy agree she will benefit from transitional care. I discussed the role of smoking in osteoporosis and recommended that she at least not smoke while the fractures are healing.    Therefore, she is discharged in good and stable condition for further post-acute management.     SPECIFIC OUTPATIENT FOLLOW-UP  Skilled nursing care.    DISCHARGE PROBLEM LIST  Active Problems:    Rheumatoid arthritis (CMS-HCC) POA: Yes      Overview: ICD-10 transition    Postmenopausal osteoporosis POA: Yes    Chronic use of opiate for therapeutic purpose POA: Yes      Overview: Opiate Risk Score low, 0      Date of Last therapy agreement/ update 11/2015      Date of last UDS 7/8/16        Discrepancies:No                Fall POA: Unknown    Intractable pain POA: Unknown    Sacral fracture, closed (CMS-HCC) POA: Unknown    Hypokalemia POA: Unknown  Resolved Problems:    * No resolved hospital problems. *      FOLLOW UP  Future Appointments  Date Time Provider Department Center   7/3/2017 11:00 AM NGUYỄN [place]   7/13/2017 10:00 AM GRECIA [place] None     No follow-up provider specified.    MEDICATIONS ON DISCHARGE   Lakisha Bhatti   Home Medication Instructions ASIF:[id number]    Printed on:04/06/17 0789   Medication Information                       aspirin EC (ECOTRIN) 81 MG Tablet Delayed Response  Take 81 mg by mouth every day.             atorvastatin (LIPITOR) 40 MG Tab  Take 1 Tab by mouth every bedtime.             clopidogrel (PLAVIX) 75 MG Tab  Take 1 Tab by mouth every day.             diltiazem CD (CARDIZEM CD) 180 MG CAPSULE SR 24 HR  TAKE ONE CAPSULE BY MOUTH ONCE DAILY             folic acid (FOLVITE) 1 MG TABS  Take 2 mg by mouth every day.             hydrocodone/acetaminophen (NORCO)  MG Tab  Take 1 Tab by mouth every four hours as needed for Moderate Pain or Severe Pain.             lisinopril (PRINIVIL, ZESTRIL) 40 MG tablet  Take 1 Tab by mouth every day.             lorazepam (ATIVAN) 0.5 MG Tab  Take 1 Tab by mouth 2 times a day as needed for Anxiety.             megestrol (MEGACE) 20 MG Tab  Take 1 Tab by mouth every day.             mirtazapine (REMERON) 15 MG Tab  Take 1 Tab by mouth every bedtime.             potassium chloride ER (KLOR-CON) 10 MEQ tablet  Take 1 Tab by mouth every day.             predniSONE (DELTASONE) 5 MG Tab  Take 5 mg by mouth every day.             sulfaSALAzine (AZULFIDINE) 500 MG Tab  Take 500 mg by mouth 2 times a day.             tizanidine (ZANAFLEX) 4 MG Tab  Take 1 Tab by mouth every 6 hours as needed (muscle spasms).             trazodone (DESYREL) 100 MG Tab  Take 1 Tab by mouth every bedtime.                 DIET  Orders Placed This Encounter   Procedures   • Diet Order     Standing Status: Standing      Number of Occurrences: 1      Standing Expiration Date:      Order Specific Question:  Diet:     Answer:  Cardiac [6]      Comments:  please bring boost with every meal.        ACTIVITY  As tolerated and directed by skilled nursing.      LINES, DRAINS, AND WOUNDS  This is an automated list. Peripheral IVs will be removed prior to discharge.  PIV Group Right Wrist 20g Flexible Catheter;Saline Lock (Active)   Prior to Arrival Insertion Date 04/03/17 4/3/2017  8:33 PM   Line Secured  Transparent 4/6/2017  8:42 AM   Site Condition / Description Assessed;Dry;Patent;Intact;Clean 4/6/2017  8:42 AM   Dressing Type / Description Occlusive;Transparent;Clean;Dry;Intact 4/6/2017  8:42 AM   Dressing Status Observed 4/6/2017  8:42 AM   Saline Locked Yes 4/5/2017  8:00 PM   Infiltration Grading Used by [facility] 0 4/6/2017  8:42 AM   Phlebitis Scale (Used by [facility]) 0 4/6/2017  8:42 AM   Date Primary Tubing Changed 04/04/17 4/6/2017  8:42 AM   NEXT Primary Tubing Change  04/08/17 4/6/2017  8:42 AM   NEXT Site Change Date 04/10/17 4/6/2017  8:42 AM       Surgical Incision Group Left;Anterior Neck Incision (Active)       Surgical Incision  Incision Left Arm Upper (Active)                  MENTAL STATUS ON TRANSFER  Level of Consciousness: Alert  Orientation : Oriented x 4  Speech: Speech Clear    CONSULTATIONS  None.    PROCEDURES  None.    LABORATORY  Lab Results   Component Value Date/Time    SODIUM 139 04/04/2017 02:54 AM    POTASSIUM 3.5* 04/04/2017 02:54 AM    CHLORIDE 110 04/04/2017 02:54 AM    CO2 22 04/04/2017 02:54 AM    GLUCOSE 91 04/04/2017 02:54 AM    BUN 18 04/04/2017 02:54 AM    CREATININE 0.63 04/04/2017 02:54 AM    GLOM FILT RATE, EST >59 02/28/2011 09:00 AM        Lab Results   Component Value Date/Time    WBC 7.8 04/04/2017 02:54 AM    HEMOGLOBIN 11.0* 04/04/2017 02:54 AM    HEMATOCRIT 36.4* 04/04/2017 02:54 AM    PLATELET COUNT 182 04/04/2017 02:54 AM        Total time of the discharge process exceeds 32 minutes.

## 2017-04-06 NOTE — THERAPY
"Physical Therapy Treatment completed.   Bed Mobility:  Supine to Sit: Refused  Transfers: Sit to Stand: Minimal Assist  Gait: Level Of Assist: Moderate Assist with FWW      Plan of Care: Will benefit from Physical Therapy 3 times per week  Discharge Recommendations: Equipment: No Equipment Needed. Post-acute therapy Discharge to a transitional care facility for continued skilled therapy services. Pt to d/c to SNF this afternoon to regain increased independence in functional mobility prior to d/c home with .      See \"Rehab Therapy-Acute\" Patient Summary Report for complete documentation.       "

## 2017-04-06 NOTE — PROGRESS NOTES
Report called to Syeda Rios LPN at [place]. All questions answered. [facility]

## 2017-04-06 NOTE — DISCHARGE PLANNING
Xray sent to Abel at [place] as requested. Also requested PASRR from [facility] via voicemail.

## 2017-04-06 NOTE — PROGRESS NOTES
Received awake and oriented x 4 sitting at the edge of bed,  at the bed side, due med given as ordered, still c/o left hip pain, assisted by 2 person getting her settle down at the recliner to sleep tonight, feel better as pt verbalized, call light within reach, bed alarm in placed, needs attended, will continue to monitor.

## 2017-04-06 NOTE — CARE PLAN
Problem: Nutritional:  Goal: Achieve adequate nutritional intake  Patient will consistently consume >50% of meals   Outcome: MET Date Met:  04/06/17  Supplements on board and patient eating well with > 50% of most meals.   RD available PRN.

## 2017-04-06 NOTE — CARE PLAN
Problem: Discharge Barriers/Planning  Goal: Patient’s continuum of care needs will be met  Intervention: Collaborate with Transitional Care Team and Interdisciplinary Team to meet discharge needs  Possible discharge to [place] or [facility].      Problem: Mobility  Goal: Risk for activity intolerance will decrease  Intervention: Assess and monitor signs of activity intolerance  Assisted by 2 person sitting, lying down in bed.

## 2017-05-01 RX ORDER — DILTIAZEM HYDROCHLORIDE 180 MG/1
CAPSULE, EXTENDED RELEASE ORAL
Qty: 30 CAP | Refills: 11 | Status: SHIPPED | OUTPATIENT
Start: 2017-05-01 | End: 2018-01-04 | Stop reason: SDUPTHER

## 2017-05-04 DIAGNOSIS — M85.851 OSTEOPENIA OF RIGHT THIGH: ICD-10-CM

## 2017-05-04 DIAGNOSIS — S32.10XD CLOSED FRACTURE OF SACRUM WITH ROUTINE HEALING, UNSPECIFIED PORTION OF SACRUM, SUBSEQUENT ENCOUNTER: ICD-10-CM

## 2017-05-04 RX ORDER — CALCITONIN SALMON 200 [IU]/.09ML
1 SPRAY, METERED NASAL DAILY
Qty: 1 BOTTLE | Refills: 2 | Status: SHIPPED | OUTPATIENT
Start: 2017-05-04 | End: 2017-10-03

## 2017-05-16 ENCOUNTER — PATIENT OUTREACH (OUTPATIENT)
Dept: HEALTH INFORMATION MANAGEMENT | Facility: OTHER | Age: 72
End: 2017-05-16

## 2017-05-16 NOTE — PROGRESS NOTES
Outcome: Left Message    WebIZ Checked & Epic Updated:  yes    HealthConnect Verified: yes    Attempt # 1

## 2017-06-12 RX ORDER — MEGESTROL ACETATE 20 MG/1
TABLET ORAL
Qty: 30 TAB | Refills: 4 | Status: SHIPPED | OUTPATIENT
Start: 2017-06-12 | End: 2017-07-03 | Stop reason: SDUPTHER

## 2017-07-03 ENCOUNTER — OFFICE VISIT (OUTPATIENT)
Dept: MEDICAL GROUP | Facility: CLINIC | Age: 72
End: 2017-07-03
Payer: MEDICARE

## 2017-07-03 VITALS
BODY MASS INDEX: 16.48 KG/M2 | HEIGHT: 63 IN | DIASTOLIC BLOOD PRESSURE: 80 MMHG | RESPIRATION RATE: 16 BRPM | SYSTOLIC BLOOD PRESSURE: 160 MMHG | HEART RATE: 74 BPM | TEMPERATURE: 99.9 F | OXYGEN SATURATION: 95 % | WEIGHT: 93 LBS

## 2017-07-03 DIAGNOSIS — Z86.73 RECENT CEREBROVASCULAR ACCIDENT: ICD-10-CM

## 2017-07-03 DIAGNOSIS — E46 PROTEIN CALORIE MALNUTRITION (HCC): ICD-10-CM

## 2017-07-03 DIAGNOSIS — M05.79 RHEUMATOID ARTHRITIS INVOLVING MULTIPLE SITES WITH POSITIVE RHEUMATOID FACTOR (HCC): ICD-10-CM

## 2017-07-03 DIAGNOSIS — M54.89 VERTEBROGENIC PAIN SYNDROME: ICD-10-CM

## 2017-07-03 DIAGNOSIS — M80.08XD FRACTURE OF VERTEBRA DUE TO OSTEOPOROSIS WITH ROUTINE HEALING: ICD-10-CM

## 2017-07-03 DIAGNOSIS — I10 ESSENTIAL HYPERTENSION: ICD-10-CM

## 2017-07-03 DIAGNOSIS — Z79.891 CHRONIC USE OF OPIATE FOR THERAPEUTIC PURPOSE: ICD-10-CM

## 2017-07-03 DIAGNOSIS — Z12.31 ENCOUNTER FOR SCREENING MAMMOGRAM FOR BREAST CANCER: ICD-10-CM

## 2017-07-03 DIAGNOSIS — F32.2 SEVERE MAJOR DEPRESSIVE DISORDER (HCC): ICD-10-CM

## 2017-07-03 DIAGNOSIS — R63.4 UNINTENTIONAL WEIGHT LOSS OF 10% BODY WEIGHT WITHIN 6 MONTHS: ICD-10-CM

## 2017-07-03 DIAGNOSIS — S32.10XS CLOSED FRACTURE OF SACRUM, UNSPECIFIED PORTION OF SACRUM, SEQUELA: ICD-10-CM

## 2017-07-03 PROCEDURE — 99213 OFFICE O/P EST LOW 20 MIN: CPT | Performed by: FAMILY MEDICINE

## 2017-07-03 RX ORDER — MEGESTROL ACETATE 40 MG/1
40 TABLET ORAL DAILY
Qty: 30 TAB | Refills: 6 | Status: SHIPPED | OUTPATIENT
Start: 2017-07-03 | End: 2017-10-03

## 2017-07-03 RX ORDER — MIRTAZAPINE 15 MG/1
15 TABLET, FILM COATED ORAL
Qty: 30 TAB | Refills: 6 | Status: SHIPPED | OUTPATIENT
Start: 2017-07-03 | End: 2018-01-04 | Stop reason: SDUPTHER

## 2017-07-03 RX ORDER — HYDROCODONE BITARTRATE AND ACETAMINOPHEN 10; 325 MG/1; MG/1
1 TABLET ORAL EVERY 4 HOURS PRN
Qty: 180 TAB | Refills: 0 | Status: SHIPPED | OUTPATIENT
Start: 2017-07-03 | End: 2017-10-03 | Stop reason: SDUPTHER

## 2017-07-03 RX ORDER — HYDROCODONE BITARTRATE AND ACETAMINOPHEN 10; 325 MG/1; MG/1
1 TABLET ORAL EVERY 4 HOURS PRN
Qty: 180 TAB | Refills: 0 | Status: SHIPPED | OUTPATIENT
Start: 2017-07-03 | End: 2017-07-03 | Stop reason: SDUPTHER

## 2017-07-03 RX ORDER — ATORVASTATIN CALCIUM 40 MG/1
40 TABLET, FILM COATED ORAL
Qty: 30 TAB | Refills: 6 | Status: SHIPPED | OUTPATIENT
Start: 2017-07-03 | End: 2018-01-04 | Stop reason: SDUPTHER

## 2017-07-03 NOTE — MR AVS SNAPSHOT
"        Lakisha Bakari Davy   7/3/2017 11:00 AM   Office Visit   MRN: [id number]    Department:  [facility]   Dept Phone:  [phone]    Description:  Female : 1945   Provider:  Mariella Padilla M.D.           Reason for Visit     Back Pain     Hypertension           Allergies as of 7/3/2017     Allergen Noted Reactions    Gold 2007   Hives    Hydrochlorothiazide [Hctz] 10/01/2007   Itching    Imuran [Azathioprine Sodium] 2010   Vomiting    Naproxen 10/01/2007   Itching    Azathioprine 10/01/2007       DOES NOT REMEMBER REACTIOON    Hydrochlorothiazide 2016       Naproxen 2016       Relafen [Nabumetone] 10/01/2007       Did not work    Tape 2015       Paper tape is ok      You were diagnosed with     Chronic use of opiate for therapeutic purpose   [7580574]       Essential hypertension   [4791587]       Protein calorie malnutrition (CMS-HCC)   [759099]       Unintentional weight loss of 10% body weight within 6 months   [1990898]       Closed fracture of sacrum, unspecified portion of sacrum, sequela (CMS-HCC)   [6297872]       Vertebrogenic pain syndrome   [895923]       Rheumatoid arthritis involving multiple sites with positive rheumatoid factor (CMS-[facility])   [4435518]       Fracture of vertebra due to osteoporosis with routine healing   [5858457]       Encounter for screening mammogram for breast cancer   [4986660]       Severe major depressive disorder (CMS-[facility])   [0910559]       Recent cerebrovascular accident   [1746086]         Vital Signs     Blood Pressure Pulse Temperature Respirations Height Weight    160/80 mmHg 74 37.7 °C (99.9 °F) 16 1.6 m (5' 2.99\") 42.185 kg (93 lb)    Body Mass Index Oxygen Saturation Smoking Status             16.48 kg/m2 95% Current Every Day Smoker         Basic Information     Date Of Birth Sex Race Ethnicity Preferred Language    1945 Female White Non- English      Your appointments     2017  9:40 AM   Follow Up " Visit with Helen Merino PA-C   [facility] (--)    [address], [address]  [place] [id number] [phone]           You will be receiving a confirmation call a few days before your appointment from our automated call confirmation system.            Oct 03, 2017 10:00 AM   Established Patient with Mariella Padilla M.D.   [facility])    [address] [facility] [facility]  [place] [id number] [phone]           You will be receiving a confirmation call a few days before your appointment from our automated call confirmation system.              Problem List              ICD-10-CM Priority Class Noted - Resolved    Rheumatoid arthritis (CMS-HCC) M06.9   Unknown - Present    Essential hypertension I10   2/4/2010 - Present    Carotid artery stenosis I65.29   Unknown - Present    History of cerebrovascular accident with residual effects I69.90 High  1/1/2010 - Present    Dyslipidemia, goal LDL below 100 E78.5   Unknown - Present    Postmenopausal osteoporosis M81.0   Unknown - Present    Vitamin D deficiency disease E55.9   Unknown - Present    MEDICAL HOME    10/10/2012 - Present    Vertebrogenic pain syndrome M54.9   Unknown - Present    Tobacco dependence F17.200   1/14/2015 - Present    Fracture of vertebra due to osteoporosis with routine healing M80.88XD   8/14/2015 - Present    Abdominal aortic atherosclerosis (CMS-HCC) I70.0   11/10/2015 - Present    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   11/10/2015 - Present    Chronic use of opiate for therapeutic purpose Z79.891   11/10/2015 - Present    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   12/22/2015 - Present    Cerebrovascular accident (CVA) with involvement of right side of body (CMS-HCC) I63.9   5/20/2016 - Present    RA (rheumatoid arthritis) (CMS-HCC) M06.9   5/21/2016 - Present    Chronic back pain M54.9, G89.29   5/21/2016 - Present    Hypertension I10   5/21/2016 - Present    Unintended weight loss R63.4   5/21/2016  - Present    Iliac artery stenosis, right (CMS-HCC) I77.1   Unknown - Present    Claudication of right lower extremity (CMS-HCC) I73.9   12/2/2016 - Present    Severe major depressive disorder (CMS-HCC) F32.2   1/6/2017 - Present    Fall W19.XXXA   4/3/2017 - Present    Intractable pain R52   4/3/2017 - Present    Sacral fracture, closed (CMS-HCC) S32.10XA   4/4/2017 - Present    Hypokalemia E87.6   4/5/2017 - Present    Osteopenia of right thigh M85.851   5/4/2017 - Present    Protein calorie malnutrition (CMS-HCC) E46   7/3/2017 - Present      Health Maintenance        Date Due Completion Dates    MAMMOGRAM 4/22/2017 4/22/2016, 9/20/2013, 12/30/2011, 12/30/2011 (Prv Comp), 2/16/2011, 2/8/2011    Override on 12/30/2011: Previously completed    IMM ZOSTER VACCINE 8/1/2020 (Originally 10/21/2007) ---    IMM INFLUENZA (1) 9/1/2017 11/18/2016, 10/12/2015, 10/3/2014, 11/10/2013    BONE DENSITY 9/20/2018 9/20/2013, 6/8/2010, 6/18/2007, 2/11/2005    COLONOSCOPY 1/24/2019 1/24/2016, 1/18/2016    IMM DTaP/Tdap/Td Vaccine (2 - Td) 10/8/2023 10/8/2013            Current Immunizations     13-VALENT PCV PREVNAR 11/18/2016    Influenza Vaccine Adult HD 11/18/2016, 10/12/2015  9:32 AM, 11/10/2013    Influenza Vaccine Quad Inj (Pf) 10/3/2014    Pneumococcal Vaccine (UF)Historical Data 10/9/2007    Pneumococcal polysaccharide vaccine (PPSV-23) 1/14/2015    Tdap Vaccine 10/8/2013      Below and/or attached are the medications your provider expects you to take. Review all of your home medications and newly ordered medications with your provider and/or pharmacist. Follow medication instructions as directed by your provider and/or pharmacist. Please keep your medication list with you and share with your provider. Update the information when medications are discontinued, doses are changed, or new medications (including over-the-counter products) are added; and carry medication information at all times in the event of emergency  situations     Allergies:  GOLD - Hives     HYDROCHLOROTHIAZIDE - Itching     IMURAN - Vomiting     NAPROXEN - Itching     AZATHIOPRINE - (reactions not documented)     HYDROCHLOROTHIAZIDE - (reactions not documented)     NAPROXEN - (reactions not documented)     RELAFEN - (reactions not documented)     TAPE - (reactions not documented)               Medications  Valid as of: July 03, 2017 - 11:35 AM    Generic Name Brand Name Tablet Size Instructions for use    Aspirin (Tablet Delayed Response) ECOTRIN 81 MG Take 81 mg by mouth every day.        Atorvastatin Calcium (Tab) LIPITOR 40 MG Take 1 Tab by mouth every bedtime.        Calcitonin ([place]) (Solution) MIACALCIN 200 UNIT/ACT Spray 1 Spray in nose every day.        Clopidogrel Bisulfate (Tab) PLAVIX 75 MG Take 1 Tab by mouth every day.        DilTIAZem HCl Coated Beads (CAPSULE SR 24 HR) CARTIA  MG TAKE ONE CAPSULE BY MOUTH ONCE DAILY        Folic Acid (Tab) FOLVITE 1 MG Take 2 mg by mouth every day.        Hydrocodone-Acetaminophen (Tab) NORCO  MG Take 1 Tab by mouth every four hours as needed for Moderate Pain or Severe Pain.        Lisinopril (Tab) PRINIVIL, ZESTRIL 40 MG Take 1 Tab by mouth every day.        Megestrol Acetate (Tab) MEGACE 40 MG Take 1 Tab by mouth every day.        Mirtazapine (Tab) REMERON 15 MG Take 1 Tab by mouth every bedtime.        Potassium Chloride (Tab CR) KLOR-CON 10 MEQ Take 1 Tab by mouth every day.        PredniSONE (Tab) DELTASONE 5 MG Take 5 mg by mouth every day.        SulfaSALAzine (Tab) AZULFIDINE 500 MG Take 500 mg by mouth 2 times a day.        TraZODone HCl (Tab) DESYREL 100 MG Take 1 Tab by mouth every bedtime.        .                 Medicines prescribed today were sent to:     [facility] [address] [place] [address], [place] - [address] [facility]    [address] [facility] [place] [address]    Phone: [phone] Fax: [phone]    Open 24 Hours?: No      Medication refill instructions:       If your prescription  bottle indicates you have medication refills left, it is not necessary to call your provider’s office. Please contact your pharmacy and they will refill your medication.    If your prescription bottle indicates you do not have any refills left, you may request refills at any time through one of the following ways: The online [employer] system (except Urgent Care), by calling your provider’s office, or by asking your pharmacy to contact your provider’s office with a refill request. Medication refills are processed only during regular business hours and may not be available until the next business day. Your provider may request additional information or to have a follow-up visit with you prior to refilling your medication.   *Please Note: Medication refills are assigned a new Rx number when refilled electronically. Your pharmacy may indicate that no refills were authorized even though a new prescription for the same medication is available at the pharmacy. Please request the medicine by name with the pharmacy before contacting your provider for a refill.        Your To Do List     Future Labs/Procedures Complete By Expires    MA-MAMMO SCREENING BILAT W/MARY JANE W/CAD  As directed 7/3/2018    [facility] PAIN MANAGEMENT SCREEN  As directed 7/3/2018    Comments:    Current Meds (name, sig, last dose):   Current outpatient prescriptions:   •  megestrol, TAKE ONE TABLET BY MOUTH ONCE DAILY  •  calcitonin ([place]), 1 Spray, Nasal, DAILY  •  CARTIA XT, TAKE ONE CAPSULE BY MOUTH ONCE DAILY  •  tizanidine, 4 mg, Oral, Q6HRS PRN  •  clopidogrel, 75 mg, Oral, DAILY, 4/3/2017 at 0800  •  hydrocodone/acetaminophen, 1 Tab, Oral, Q4HRS PRN, 4/3/2017 at 1730  •  mirtazapine, 15 mg, Oral, QHS, 4/2/2017 at 2000  •  atorvastatin, 40 mg, Oral, QHS, 4/2/2017 at 2000  •  predniSONE, 5 mg, Oral, DAILY, 4/3/2017 at 0800  •  lorazepam, 0.5 mg, Oral, BID PRN, 4/3/2017 at 0800  •  lisinopril, 40 mg, Oral, DAILY, 4/3/2017 at 0800  •  trazodone, 100 mg,  Oral, QHS, 4/2/2017 at 2000  •  potassium chloride ER, 10 mEq, Oral, DAILY, 4/3/2017 at 0800  •  sulfaSALAzine, 500 mg, Oral, BID, 4/3/2017 at 0800  •  aspirin EC, 81 mg, Oral, DAILY, 4/3/2017 at 0800  •  folic acid, 2 mg, Oral, DAILY, 4/3/2017 at 0800            Other Notes About Your Plan     Patient is enrolled in [facility] Team with Dr Padilla  patient has been treated for her arthritic pain with Norco and then Percocet for > 1 year. ? Opioid dependence, continuous code 304.01           MyChart Access Code: Activation code not generated  Current [place] Status: Active          Quit Tobacco Information     Do you want to quit using tobacco?    Quitting tobacco decreases risks of cancer, heart and lung disease, increases life expectancy, improves sense of taste and smell, and increases spending money, among other benefits.    If you are thinking about quitting, we can help.  • [employer] Quit Tobacco Program: 446.918.4859  o Program occurs weekly for four weeks and includes pharmacist consultation on products to support quitting smoking or chewing tobacco. A provider referral is needed for pharmacist consultation.  • Tobacco Users Help Hotline: 9-214-QUIT-NOW (907-1782) or https://[place].quitlogix.org/  o Free, confidential telephone and online coaching for [place] residents. Sessions are designed on a schedule that is convenient for you. Eligible clients receive free nicotine replacement therapy.  • Nationally: www.smokefree.gov  o Information and professional assistance to support both immediate and long-term needs as you become, and remain, a non-smoker. Smokefree.gov allows you to choose the help that best fits your needs.

## 2017-07-03 NOTE — PROGRESS NOTES
CC: Chronic pain in the setting of rheumatoid arthritis, hypertension, malnutrition, unintentional weight loss, recent sacral fracture, vertebral pain    HPI:   Lakisha presents today with the following.    1. Chronic use of opiate for therapeutic purpose  No aberrant or addictive use of medications has been observed.  Patient counseled to not add Tylenol to current regimen.  Patient counseled to keep medications locked up or under personal control.    2. Essential hypertension  HTN - Chronic condition stable. Currently taking all meds as directed.   She is taking baby aspirin daily.   She is not monitoring BP at home. She ends up seeing a medical person at least every couple months and so has just relied on that.  Denies symptoms low BP: light-headed, tunnel-vision, unusual fatigue.   Denies symptoms high BP:pounding headache, visual changes, palpitations, flushed face.   Denies medicine side effects: unusual fatigue, slow heartbeat, foot/leg swelling, cough.    3. Protein calorie malnutrition (CMS-HCC)  She is trying to do wide variety but does not always eat much protein. Her  tries to prepare things for her that she will like to eat.    4. Unintentional weight loss of 10% body weight within 6 months  They discussed at the hospital that the Megace dose was quite low. I have started low because she is frail but understands that it may not be enough to attempt her appetite well. I agree with increasing the dose.    5. Closed fracture of sacrum, unspecified portion of sacrum, sequela (CMS-HCC)  She had an odd fracture tripping over a small child. She feels she is doing much better. The pain is currently controlled with the assistance of the medication. Denies somnolence or confusion from the medication. Denies balance problems.    6. Vertebrogenic pain syndrome/fracture of vertebra due to osteoporosis with routine healing  She continues to have mid back pain which is partly from the arthritis, partly from a  vertebral fracture.    7. Rheumatoid arthritis involving multiple sites with positive rheumatoid factor (CMS-HCC)  She has had great difficulty finding a disease modifying antirheumatic agent that worked well for her. Several have worked well but have created severe weight loss.  She is on sulfasalazine which is a relatively weak agent along with prednisone which may be contributing to her bone demineralization. She really does occupy the space between a rock at [facility].        Patient Active Problem List    Diagnosis Date Noted   • History of cerebrovascular accident with residual effects 01/01/2010     Priority: High   • Protein calorie malnutrition (CMS-HCC) 07/03/2017   • Osteopenia of right thigh 05/04/2017   • Hypokalemia 04/05/2017   • Sacral fracture, closed (CMS-HCC) 04/04/2017   • Fall 04/03/2017   • Intractable pain 04/03/2017   • Major depress dis, severe 01/06/2017   • Claudication of right lower extremity (CMS-HCC) 12/02/2016   • Iliac artery stenosis, right (CMS-HCC)    • RA (rheumatoid arthritis) (CMS-HCC) 05/21/2016   • Chronic back pain 05/21/2016   • Hypertension 05/21/2016   • Unintended weight loss 05/21/2016   • Cerebrovascular accident (CVA) with involvement of right side of body (CMS-HCC) 05/20/2016   • 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   • Abdominal aortic atherosclerosis (CMS-HCC) 11/10/2015   • 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 30 Tab 6   • hydrocodone/acetaminophen (NORCO)  MG Tab Take 1 Tab by mouth every four hours as needed for Moderate Pain or Severe Pain. 180 Tab 0   • calcitonin, salmon, (MIACALCIN) 200 UNIT/ACT Solution Spray 1 Spray in nose every day. 1 Bottle 2   • CARTIA  MG CAPSULE SR 24 HR TAKE ONE CAPSULE BY MOUTH ONCE DAILY 30 Cap 11   • clopidogrel (PLAVIX) 75 MG Tab Take 1 Tab by mouth every day. 90 Tab 3   • mirtazapine (REMERON) 15 MG Tab Take 1 Tab by mouth every bedtime. 30 Tab 6   • atorvastatin (LIPITOR) 40 MG Tab Take 1 Tab by mouth every bedtime. 30 Tab 6   • predniSONE (DELTASONE) 5 MG Tab Take 5 mg by mouth every day.     • lorazepam (ATIVAN) 0.5 MG Tab Take 1 Tab by mouth 2 times a day as needed for Anxiety. 60 Tab 3   • lisinopril (PRINIVIL, ZESTRIL) 40 MG tablet Take 1 Tab by mouth every day. 90 Tab 3   • trazodone (DESYREL) 100 MG Tab Take 1 Tab by mouth every bedtime. 30 Tab 6   • potassium chloride ER (KLOR-CON) 10 MEQ tablet Take 1 Tab by mouth every day. 30 Tab 6   • sulfaSALAzine (AZULFIDINE) 500 MG Tab Take 500 mg by mouth 2 times a day.     • aspirin EC (ECOTRIN) 81 MG Tablet Delayed Response Take 81 mg by mouth every day.     • folic acid (FOLVITE) 1 MG TABS Take 2 mg by mouth every day.       No current facility-administered medications for this visit.         Allergies as of 07/03/2017 - Carlos as Reviewed 07/03/2017   Allergen Reaction Noted   • Gold Hives 08/13/2007   • Hydrochlorothiazide [hctz] Itching 10/01/2007   • Imuran [azathioprine sodium] Vomiting 02/17/2010   • Naproxen Itching 10/01/2007   • Azathioprine  10/01/2007   • Hydrochlorothiazide  05/20/2016   • Naproxen  05/20/2016   • Relafen [nabumetone]  10/01/2007   • Tape  12/08/2015        ROS: As per HPI.    /80 mmHg  Pulse 74  Temp(Src) 37.7 °C (99.9 °F)  Resp 16  Ht 1.6 m (5' 2.99\")  Wt 42.185 kg (93 lb)  BMI 16.48 kg/m2  SpO2 95%    Physical Exam:  Gen:         Alert and " oriented, No apparent distress.  Lucid and fluent.  Neck:        No Lymphadenopathy or Bruits.  Lungs:     Clear to auscultation bilaterally  CV:          Regular rate and rhythm. No murmurs, rubs or gallops.               Ext:          No clubbing, cyanosis, edema.      Assessment and Plan.   71 y.o. female with the following issues.    1. Chronic use of opiate for therapeutic purpose  [facility] board of pharmacy interface is reviewed.  No inconsistencies are found.  Her average MME is 60, active is 60, max active is 60.  This is within CDC guideline for chronic pain prescribing by primary care.  - [facility] PAIN MANAGEMENT SCREEN; Future    2. Essential hypertension  Her pressure is high. She forgot to take the diltiazem again today. She states she will take it as soon as she gets home. Have advised her to leave at least 12 hours between taking this dose and taking tomorrow's dose.    3. Protein calorie malnutrition (CMS-HCC)  The Megace is increased as I agree that the previous dose was small.  - megestrol (MEGACE) 40 MG Tab; Take 1 Tab by mouth every day.  Dispense: 30 Tab; Refill: 6    4. Unintentional weight loss of 10% body weight within 6 months  The Megace is increased. She is trying to eat more frequently as well.  - megestrol (MEGACE) 40 MG Tab; Take 1 Tab by mouth every day.  Dispense: 30 Tab; Refill: 6    5. Closed fracture of sacrum, unspecified portion of sacrum, sequela (CMS-HCC)  The medication is helpful and well tolerated and is renewed  - hydrocodone/acetaminophen (NORCO)  MG Tab; Take 1 Tab by mouth every four hours as needed for Moderate Pain or Severe Pain.  Dispense: 180 Tab; Refill: 0    6. Vertebrogenic pain syndrome  The medication is helpful and well tolerated and is renewed  - hydrocodone/acetaminophen (NORCO)  MG Tab; Take 1 Tab by mouth every four hours as needed for Moderate Pain or Severe Pain.  Dispense: 180 Tab; Refill: 0    7. Rheumatoid arthritis involving multiple sites  with positive rheumatoid factor (CMS-HCC)  The medication is helpful and well tolerated and is renewed  - hydrocodone/acetaminophen (NORCO)  MG Tab; Take 1 Tab by mouth every four hours as needed for Moderate Pain or Severe Pain.  Dispense: 180 Tab; Refill: 0    8. Fracture of vertebra due to osteoporosis with routine healing  She continues to have some pain but the medication is helpful.  - hydrocodone/acetaminophen (NORCO)  MG Tab; Take 1 Tab by mouth every four hours as needed for Moderate Pain or Severe Pain.  Dispense: 180 Tab; Refill: 0    Chronic pain recheck:   Last dose of controlled substance: This afternoon, she does not drive  Chronic pain treated with hydrocodone/APAP taken 4-5 times a day    She  reports that she does not drink alcohol.  She  reports that she does not use illicit drugs.    Consequences of Chronic Opiate therapy:  (5 A's)  Analgesia: Compared to no treatment or prior treatment, pain is currently improved  Activity: improved  Adverse Events: She denies constipation, itchy skin, nausea and sedation  Aberrant Behaviors: She reports she is taking medication as prescribed and is not veering from agreed treatment regimen. There have been no inappropriate refills or lost/stolen meds reported.   Affect/Mood: Pain is occasionally impacting patient's mood.  Patient reports stable depression/anxiety.    Nonnarcotic treatments that are being used: sulfasalazine, aspirin.  cannot use other NSAIDS due to antibocagulants and risk of clot.   Treatment goals discussed.    Opioid Risk Score: 1    Interpretation of Opioid Risk Score   Score 0-3 = Low risk of abuse. Do UDS at least once per year.  Score 4-7 = Moderate risk of abuse. Do UDS 1-4 times per year.  Score 8+ = High risk of abuse. Refer to specialist.    Last order of CONTROLLED SUBSTANCE TREATMENT AGREEMENT was found on 1/6/2017 from Office Visit on 1/6/2017     UDS Summary                URINE DRUG SCREEN Overdue 7/3/2017      Done  7/8/2016 PAIN MANAGEMENT PANEL, SCRN W/ RFLX TO QNT        Most recent UDS reviewed today and is consistent with prescribed medications.    I have reviewed the medical records, the Prescription Monitoring Program and I have determined that controlled substance treatment is medically indicated.

## 2017-09-20 ENCOUNTER — PATIENT MESSAGE (OUTPATIENT)
Dept: HEALTH INFORMATION MANAGEMENT | Facility: OTHER | Age: 72
End: 2017-09-20

## 2017-10-03 ENCOUNTER — OFFICE VISIT (OUTPATIENT)
Dept: MEDICAL GROUP | Facility: CLINIC | Age: 72
End: 2017-10-03
Payer: MEDICARE

## 2017-10-03 VITALS
OXYGEN SATURATION: 92 % | BODY MASS INDEX: 16.48 KG/M2 | TEMPERATURE: 97.2 F | HEIGHT: 63 IN | SYSTOLIC BLOOD PRESSURE: 140 MMHG | WEIGHT: 93 LBS | DIASTOLIC BLOOD PRESSURE: 80 MMHG | RESPIRATION RATE: 16 BRPM | HEART RATE: 86 BPM

## 2017-10-03 DIAGNOSIS — I74.2 BRACHIAL ARTERY THROMBUS (HCC): ICD-10-CM

## 2017-10-03 DIAGNOSIS — F32.3 MAJOR DEPRESSIVE DISORDER, SINGLE EPISODE, SEVERE WITH PSYCHOTIC FEATURES (HCC): ICD-10-CM

## 2017-10-03 DIAGNOSIS — Z23 NEED FOR IMMUNIZATION AGAINST INFLUENZA: ICD-10-CM

## 2017-10-03 DIAGNOSIS — I65.23 BILATERAL CAROTID ARTERY STENOSIS: ICD-10-CM

## 2017-10-03 DIAGNOSIS — Z79.891 CHRONIC USE OF OPIATE FOR THERAPEUTIC PURPOSE: ICD-10-CM

## 2017-10-03 DIAGNOSIS — I73.9 CLAUDICATION OF RIGHT LOWER EXTREMITY (HCC): ICD-10-CM

## 2017-10-03 DIAGNOSIS — M54.89 VERTEBROGENIC PAIN SYNDROME: ICD-10-CM

## 2017-10-03 DIAGNOSIS — M05.79 RHEUMATOID ARTHRITIS INVOLVING MULTIPLE SITES WITH POSITIVE RHEUMATOID FACTOR (HCC): ICD-10-CM

## 2017-10-03 DIAGNOSIS — M80.08XD FRACTURE OF VERTEBRA DUE TO OSTEOPOROSIS WITH ROUTINE HEALING: ICD-10-CM

## 2017-10-03 DIAGNOSIS — I70.0 ABDOMINAL AORTIC ATHEROSCLEROSIS (HCC): ICD-10-CM

## 2017-10-03 DIAGNOSIS — I77.1 ILIAC ARTERY STENOSIS, RIGHT (HCC): ICD-10-CM

## 2017-10-03 DIAGNOSIS — I10 ESSENTIAL HYPERTENSION: ICD-10-CM

## 2017-10-03 DIAGNOSIS — S32.10XS CLOSED FRACTURE OF SACRUM, UNSPECIFIED PORTION OF SACRUM, SEQUELA: ICD-10-CM

## 2017-10-03 DIAGNOSIS — I71.9 PSEUDOANEURYSM OF AORTA (HCC): ICD-10-CM

## 2017-10-03 PROCEDURE — G0008 ADMIN INFLUENZA VIRUS VAC: HCPCS | Performed by: FAMILY MEDICINE

## 2017-10-03 PROCEDURE — 90662 IIV NO PRSV INCREASED AG IM: CPT | Performed by: FAMILY MEDICINE

## 2017-10-03 PROCEDURE — 99213 OFFICE O/P EST LOW 20 MIN: CPT | Mod: 25 | Performed by: FAMILY MEDICINE

## 2017-10-03 RX ORDER — HYDROCODONE BITARTRATE AND ACETAMINOPHEN 10; 325 MG/1; MG/1
1 TABLET ORAL EVERY 4 HOURS PRN
Qty: 180 TAB | Refills: 0 | Status: SHIPPED | OUTPATIENT
Start: 2017-10-03 | End: 2018-01-04 | Stop reason: SDUPTHER

## 2017-10-03 RX ORDER — HYDROCODONE BITARTRATE AND ACETAMINOPHEN 10; 325 MG/1; MG/1
1 TABLET ORAL EVERY 4 HOURS PRN
Qty: 180 TAB | Refills: 0 | Status: SHIPPED | OUTPATIENT
Start: 2017-10-03 | End: 2017-10-03 | Stop reason: SDUPTHER

## 2017-10-03 RX ORDER — TRAZODONE HYDROCHLORIDE 100 MG/1
100 TABLET ORAL
Qty: 30 TAB | Refills: 6 | Status: SHIPPED | OUTPATIENT
Start: 2017-10-03 | End: 2018-01-04 | Stop reason: SDUPTHER

## 2017-10-03 RX ORDER — LISINOPRIL 40 MG/1
40 TABLET ORAL DAILY
Qty: 90 TAB | Refills: 3 | Status: SHIPPED | OUTPATIENT
Start: 2017-10-03 | End: 2018-07-09 | Stop reason: SDUPTHER

## 2017-10-03 ASSESSMENT — PATIENT HEALTH QUESTIONNAIRE - PHQ9: CLINICAL INTERPRETATION OF PHQ2 SCORE: 0

## 2017-10-03 NOTE — PROGRESS NOTES
Chief Complaint   Patient presents with   • Back Pain       Subjective:     HPI:   Lakisha Bhatti presents today with the followin. Vertebrogenic pain syndrome  Long-standing multiple arthritis and degenerative change throughout the spine and sacroiliac region along with rheumatoid change. This is the source of constant pain. She states her pain medication continues to be helpful bringing the pain from a 7 or 8 down to around a 5. Denies somnolence or confusion from the regimen. She avoids alcohol.    2. Rheumatoid arthritis involving multiple sites with positive rheumatoid factor (CMS-[facility])  She continues to follow with her rheumatologist. Unfortunately, she has failed all disease modifying antirheumatic agents today. Her weight remains low. However, off the sulfasalazine she is still no longer losing weight. She did stop the Megace as it was not helpful.      3. Fracture of vertebra due to osteoporosis with routine healing  The fracture of the vertebra appears to have healed. She denies any further pain in that area with similarity to this. The calcium nasal spray ran out and she did not renew. This problem appears to be currently resolved. Reminded her of need to continue vitamin D and calcium supplementation. She is discussing further treatment with Dr. Goodwin.    4. Closed fracture of sacrum, unspecified portion of sacrum, sequela  This appears to have healed she feels. She is able to walk and bend and sometimes even lift a little better with only the normal amount of pain.    5. Chronic use of opiate for therapeutic purpose  No aberrant or addictive use of medications has been observed.  Patient counseled to not add Tylenol to current regimen.  Patient counseled to keep medications locked up or under personal control. [facility] board of pharmacy interface is reviewed.  No inconsistencies are found.  Her average MME is 58, active is 60, max active is 60.  This is within CDC guideline for chronic pain  prescribing by primary care.    6. Need for immunization against influenza  Due, administered today    7. Essential hypertension  HTN - Chronic condition stable. Currently taking all meds as directed.   She is taking 325 mg aspirin daily.   She is monitoring BP at home. Just bought new machine.  They will start taking weekly.  Denies symptoms low BP: light-headed, tunnel-vision, unusual fatigue.   Denies symptoms high BP:pounding headache, visual changes, palpitations, flushed face.   Denies medicine side effects: unusual fatigue, slow heartbeat, foot/leg swelling, cough.    8. Major depressive disorder, single episode, severe with psychotic features (CMS-HCC)  Here for: depression.    Current symptoms include: insomnia,  depressed mood, insomnia, fatigue and taking a little longer to get things done, overall much improved from before.  Since last OV, symptoms are better  She is taking medicine daily as directed. Side effects: Dry mouth.  Mood currently does affect: work, relationships, social activities.  Denies agoraphobia, panic attacks, SI/HI. (Denies wishing to be dead, thinking about death, intent to commit suicide, previous suicide attempt)     Review Of Systems  Taking supplements to help mood or symptoms: no  Using alcohol or other substances to help mood or symptoms: no  No polydipsia, polyuria, temperature intolerance, bowel changes  No visual or auditory hallucinations. Denies symptoms of jose: grandiosity, euphoria, need for little or no sleep, rapid pressured speech, spending sprees, reckless or risky behavior, hypersexual behavior.   Pertinent  ROS findings as above. All other systems reviewed and are negative.    9. Bilateral carotid artery stenosis/abdominal aortic atherosclerosis/pseudoaneurysm of aorta/brachial artery thrombus/iliac artery stenosis/claudication of right lower extremity  She has multiple atherosclerotic problems. She is status post surgery for the iliac artery stenosis and the  carotid artery stenosis. She does not have an aneurysm but still requires some surveillance. The brachial artery thrombus has resolved. She does need follow-up with vascular surgery. Referral discussed and placed.          Patient Active Problem List    Diagnosis Date Noted   • History of cerebrovascular accident with residual effects 01/01/2010     Priority: High   • Protein calorie malnutrition (CMS-HCC) 07/03/2017   • Osteopenia of right thigh 05/04/2017   • Hypokalemia 04/05/2017   • Sacral fracture, closed (CMS-HCC) 04/04/2017   • Fall 04/03/2017   • Intractable pain 04/03/2017   • Severe major depressive disorder (CMS-HCC) 01/06/2017   • Claudication of right lower extremity (CMS-HCC) 12/02/2016   • Iliac artery stenosis, right (CMS-HCC)    • RA (rheumatoid arthritis) (CMS-HCC) 05/21/2016   • Chronic back pain 05/21/2016   • Hypertension 05/21/2016   • Unintended weight loss 05/21/2016   • Cerebrovascular accident (CVA) with involvement of right side of body (CMS-HCC) 05/20/2016   • 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   • Abdominal aortic atherosclerosis (CMS-HCC) 11/10/2015   • 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   • Chronic use of opiate for therapeutic purpose 11/10/2015   • Fracture of vertebra due to osteoporosis with routine healing 08/14/2015   • Tobacco dependence 01/14/2015   • Vertebrogenic pain syndrome    • MEDICAL HOME 10/10/2012   • Postmenopausal osteoporosis    • Vitamin D deficiency disease    • Dyslipidemia, goal LDL below 100    • Essential hypertension 02/04/2010   • Carotid artery stenosis    • Rheumatoid arthritis (CMS-HCC)        Current medicines (including changes today)  Current Outpatient Prescriptions   Medication Sig Dispense Refill   • lisinopril (PRINIVIL, ZESTRIL) 40 MG tablet Take 1 Tab by mouth every day. 90 Tab 3   • trazodone (DESYREL) 100 MG Tab Take 1 Tab by mouth every bedtime. 30 Tab 6   • hydrocodone/acetaminophen (NORCO)  MG Tab Take 1 Tab by  "mouth every four hours as needed for Moderate Pain or Severe Pain. 180 Tab 0   • mirtazapine (REMERON) 15 MG Tab Take 1 Tab by mouth every bedtime. 30 Tab 6   • atorvastatin (LIPITOR) 40 MG Tab Take 1 Tab by mouth every bedtime. 30 Tab 6   • CARTIA  MG CAPSULE SR 24 HR TAKE ONE CAPSULE BY MOUTH ONCE DAILY 30 Cap 11   • clopidogrel (PLAVIX) 75 MG Tab Take 1 Tab by mouth every day. 90 Tab 3   • predniSONE (DELTASONE) 5 MG Tab Take 5 mg by mouth every day.     • aspirin EC (ECOTRIN) 81 MG Tablet Delayed Response Take 81 mg by mouth every day.     • folic acid (FOLVITE) 1 MG TABS Take 2 mg by mouth every day.       No current facility-administered medications for this visit.        Allergies   Allergen Reactions   • Gold Hives   • Hydrochlorothiazide [Hctz] Itching   • Imuran [Azathioprine Sodium] Vomiting   • Naproxen Itching   • Azathioprine      DOES NOT REMEMBER REACTIOON   • Hydrochlorothiazide    • Naproxen    • Relafen [Nabumetone]      Did not work   • Tape      Paper tape is ok       ROS: As per HPI       Objective:     Blood pressure 140/80, pulse 86, temperature 36.2 °C (97.2 °F), resp. rate 16, height 1.6 m (5' 3\"), weight 42.2 kg (93 lb), SpO2 92 %. Body mass index is 16.47 kg/m².    Physical Exam:  Constitutional: Well-developed and well-nourished. Not diaphoretic. No distress. Lucid and fluent.  Skin: Skin is warm and dry. No rash noted.  Head: Atraumatic without lesions.  Eyes: Conjunctivae and extraocular motions are normal. Pupils are equal, round, and reactive to light. No scleral icterus.   Ears:  External ears unremarkable. Tympanic membranes clear and intact.  Nose: Nares patent. Mucosa without edema or erythema. No discharge. No facial tenderness.  Mouth/Throat: Tongue normal. Oropharynx is clear and moist. Posterior pharynx without erythema or exudates.  Neck: Supple, trachea midline. No thyromegaly present. No cervical or supraclavicular lymphadenopathy. No JVD or carotid bruits " appreciated  Cardiovascular: Regular rate and rhythm.  Normal S1, S2 without murmur appreciated.  Chest: Effort normal. Clear to auscultation throughout. No adventitious sounds.   Abdomen: Soft, non tender, and without distention. Active bowel sounds in all four quadrants. No rebound, guarding, masses or hepatosplenomegaly.  Extremities: No cyanosis, clubbing, erythema, nor edema.   Neurological: Alert and oriented x 3. DTRs 2+/3 and symmetric. No cranial nerve deficit.   Psychiatric:  Behavior, mood, and affect are appropriate.       Assessment and Plan:     71 y.o. female with the following issues:    1. Vertebrogenic pain syndrome  hydrocodone/acetaminophen (NORCO)  MG Tab    DISCONTINUED: hydrocodone/acetaminophen (NORCO)  MG Tab    DISCONTINUED: hydrocodone/acetaminophen (NORCO)  MG Tab   2. Rheumatoid arthritis involving multiple sites with positive rheumatoid factor (CMS-HCC)  hydrocodone/acetaminophen (NORCO)  MG Tab    DISCONTINUED: hydrocodone/acetaminophen (NORCO)  MG Tab    DISCONTINUED: hydrocodone/acetaminophen (NORCO)  MG Tab   3. Fracture of vertebra due to osteoporosis with routine healing  hydrocodone/acetaminophen (NORCO)  MG Tab    DISCONTINUED: hydrocodone/acetaminophen (NORCO)  MG Tab    DISCONTINUED: hydrocodone/acetaminophen (NORCO)  MG Tab   4. Closed fracture of sacrum, unspecified portion of sacrum, sequela  hydrocodone/acetaminophen (NORCO)  MG Tab    DISCONTINUED: hydrocodone/acetaminophen (NORCO)  MG Tab    DISCONTINUED: hydrocodone/acetaminophen (NORCO)  MG Tab   5. Chronic use of opiate for therapeutic purpose     6. Need for immunization against influenza  INFLUENZA VACCINE, HIGH DOSE (65+ ONLY)   7. Essential hypertension  lisinopril (PRINIVIL, ZESTRIL) 40 MG tablet   8. Major depressive disorder, single episode, severe with psychotic features (CMS-HCC)  trazodone (DESYREL) 100 MG Tab   9.     Multiple  atherosclerotic problems including bilateral carotid artery stenosis, right iliac artery stenosis and atherosclerosis of the aorta. She is referred to Dr. Silver, vascular surgery.      Chronic pain recheck:   Last dose of controlled substance: this am  Chronic pain treated with hydrocodone/apap taken 4-5 times a day    She  reports that she does not drink alcohol.  She  reports that she does not use drugs.    Consequences of Chronic Opiate therapy:  (5 A's)  Analgesia: Compared to no treatment or prior treatment, pain is currently improved  Activity: improved  Adverse Events: She denies constipation, itchy skin, nausea and sedation  Aberrant Behaviors: She reports she is taking medication as prescribed and is not veering from agreed treatment regimen. There have been no inappropriate refills or lost/stolen meds reported.   Affect/Mood: Pain is impacting patient's mood.  Patient reports depression/anxiety.    Nonnarcotic treatments that are being used: SSRI/SNRI, topical agents and uses biofreeze, cannot use NSAIDS due to hypertension and GI issues.   Treatment goals discussed.    Opioid Risk Score: 1    Interpretation of Opioid Risk Score   Score 0-3 = Low risk of abuse. Do UDS at least once per year.  Score 4-7 = Moderate risk of abuse. Do UDS 1-4 times per year.  Score 8+ = High risk of abuse. Refer to specialist.    Last order of CONTROLLED SUBSTANCE TREATMENT AGREEMENT was found on 1/6/2017 from Office Visit on 1/6/2017     UDS Summary                URINE DRUG SCREEN Next Due 6/28/2018      Done 7/3/2017 [facility] PAIN MANAGEMENT SCREEN     Patient has more history with this topic...        Most recent UDS reviewed today and is consistent with prescribed medications.    I have reviewed the medical records, the Prescription Monitoring Program and I have determined that controlled substance treatment is medically indicated.        Followup: three months and prn

## 2018-01-04 ENCOUNTER — OFFICE VISIT (OUTPATIENT)
Dept: MEDICAL GROUP | Facility: CLINIC | Age: 73
End: 2018-01-04
Payer: MEDICARE

## 2018-01-04 VITALS
HEART RATE: 70 BPM | OXYGEN SATURATION: 96 % | WEIGHT: 87 LBS | HEIGHT: 63 IN | BODY MASS INDEX: 15.41 KG/M2 | SYSTOLIC BLOOD PRESSURE: 162 MMHG | TEMPERATURE: 100 F | RESPIRATION RATE: 14 BRPM | DIASTOLIC BLOOD PRESSURE: 84 MMHG

## 2018-01-04 DIAGNOSIS — M54.89 VERTEBROGENIC PAIN SYNDROME: ICD-10-CM

## 2018-01-04 DIAGNOSIS — Z86.73 RECENT CEREBROVASCULAR ACCIDENT: ICD-10-CM

## 2018-01-04 DIAGNOSIS — S32.10XS CLOSED FRACTURE OF SACRUM, UNSPECIFIED PORTION OF SACRUM, SEQUELA: ICD-10-CM

## 2018-01-04 DIAGNOSIS — I10 ESSENTIAL HYPERTENSION: ICD-10-CM

## 2018-01-04 DIAGNOSIS — Z79.891 CHRONIC USE OF OPIATE FOR THERAPEUTIC PURPOSE: ICD-10-CM

## 2018-01-04 DIAGNOSIS — F32.2 SEVERE MAJOR DEPRESSIVE DISORDER (HCC): ICD-10-CM

## 2018-01-04 DIAGNOSIS — M05.79 RHEUMATOID ARTHRITIS INVOLVING MULTIPLE SITES WITH POSITIVE RHEUMATOID FACTOR (HCC): ICD-10-CM

## 2018-01-04 DIAGNOSIS — M80.08XD FRACTURE OF VERTEBRA DUE TO OSTEOPOROSIS WITH ROUTINE HEALING: ICD-10-CM

## 2018-01-04 DIAGNOSIS — M06.9 RHEUMATOID ARTHRITIS INVOLVING MULTIPLE JOINTS (HCC): ICD-10-CM

## 2018-01-04 PROCEDURE — 99213 OFFICE O/P EST LOW 20 MIN: CPT | Performed by: FAMILY MEDICINE

## 2018-01-04 RX ORDER — HYDROCODONE BITARTRATE AND ACETAMINOPHEN 10; 325 MG/1; MG/1
1 TABLET ORAL EVERY 4 HOURS PRN
Qty: 180 TAB | Refills: 0 | Status: SHIPPED | OUTPATIENT
Start: 2018-03-22 | End: 2018-04-04 | Stop reason: SDUPTHER

## 2018-01-04 RX ORDER — DILTIAZEM HYDROCHLORIDE 180 MG/1
180 CAPSULE, COATED, EXTENDED RELEASE ORAL DAILY
Qty: 90 CAP | Refills: 3 | Status: SHIPPED | OUTPATIENT
Start: 2018-01-04 | End: 2018-12-28 | Stop reason: SDUPTHER

## 2018-01-04 RX ORDER — ATORVASTATIN CALCIUM 40 MG/1
40 TABLET, FILM COATED ORAL
Qty: 90 TAB | Refills: 3 | Status: SHIPPED | OUTPATIENT
Start: 2018-01-04 | End: 2018-12-28 | Stop reason: SDUPTHER

## 2018-01-04 RX ORDER — HYDROCODONE BITARTRATE AND ACETAMINOPHEN 10; 325 MG/1; MG/1
1 TABLET ORAL EVERY 4 HOURS PRN
Qty: 180 TAB | Refills: 0 | Status: SHIPPED | OUTPATIENT
Start: 2018-01-21 | End: 2018-01-04 | Stop reason: SDUPTHER

## 2018-01-04 RX ORDER — MIRTAZAPINE 15 MG/1
15 TABLET, FILM COATED ORAL
Qty: 90 TAB | Refills: 3 | Status: SHIPPED | OUTPATIENT
Start: 2018-01-04 | End: 2018-12-28 | Stop reason: SDUPTHER

## 2018-01-04 RX ORDER — HYDROCODONE BITARTRATE AND ACETAMINOPHEN 10; 325 MG/1; MG/1
1 TABLET ORAL EVERY 4 HOURS PRN
Qty: 180 TAB | Refills: 0 | Status: SHIPPED | OUTPATIENT
Start: 2018-02-20 | End: 2018-01-04 | Stop reason: SDUPTHER

## 2018-01-04 RX ORDER — ASPIRIN 325 MG
1 TABLET, DELAYED RELEASE (ENTERIC COATED) ORAL DAILY
Qty: 100 TAB | Refills: 3 | COMMUNITY
Start: 2018-01-04 | End: 2020-01-01

## 2018-01-04 RX ORDER — TRAZODONE HYDROCHLORIDE 100 MG/1
100 TABLET ORAL
Qty: 90 TAB | Refills: 3 | Status: SHIPPED | OUTPATIENT
Start: 2018-01-04 | End: 2018-07-03 | Stop reason: SDUPTHER

## 2018-01-04 RX ORDER — HYDROCODONE BITARTRATE AND ACETAMINOPHEN 10; 325 MG/1; MG/1
1 TABLET ORAL EVERY 4 HOURS PRN
Qty: 180 TAB | Refills: 0 | Status: SHIPPED | OUTPATIENT
Start: 2018-01-04 | End: 2018-01-04 | Stop reason: SDUPTHER

## 2018-01-04 NOTE — PROGRESS NOTES
Chief Complaint   Patient presents with   • Hypertension   • Arthritis   • Pain       Subjective:     HPI:   Lakisha Bhatti presents today with the followin. Essential hypertension  HTN - Chronic condition stable. Currently taking all meds as directed. She does have a history of a CVA. She is not always taking her blood pressure medication regularly. Discussed that this is very important in her situation. Discussed smoking cessation, she is pre-contemplative.  She is taking baby aspirin daily.   She is not monitoring BP at home. Often takes her medication late, as today.  Denies symptoms low BP: light-headed, tunnel-vision, unusual fatigue.   Denies symptoms high BP:pounding headache, visual changes, palpitations, flushed face.   Denies medicine side effects: unusual fatigue, slow heartbeat, foot/leg swelling, cough.    2. Vertebrogenic pain syndrome  Has arthritis of multiple vertebrae. Some of this appears to be rheumatoid in type. Quite a bit of this is osteoarthritis and osteoporosis. States the hydrocodone/APAP is helpful. This brings the pain from 9 down to a 6. This is generally tolerable and allows her to complete her ADLs. She continues the 325 mg aspirin as well but cannot take standard anti-inflammatories due to her CVA history. Denies somnolence or confusion from the medication.    3. Rheumatoid arthritis involving multiple sites with positive rheumatoid factor (CMS-HCC)/Rheumatoid arthritis involving multiple joints (CMS-HCC)  She continues to treat with rheumatology. Unfortunately, she is unable to tolerate most rheumatology therapy due to severe weight loss which is a major problem anyway.  The hydrocodone helps to medicate her rheumatoid pain as does the prednisone.      4. Fracture of vertebra due to osteoporosis with routine healing  His fracture has healed and she feels that in general that pain has largely resolved. However, she is generally at risk for further fracture. She continues  to take vitamin D and low-dose calcium. She has not been able to tolerate most other treatments and rejects IV therapy at this point.    5. Closed fracture of sacrum, unspecified portion of sacrum, sequela  The sacral fracture is healing. There is still some tenderness in that area.    6.  Chronic use of opiate for therapeutic purpose  No aberrant or addictive use of medications has been observed.  Patient counseled to not add Tylenol to current regimen.  Patient counseled to keep medications locked up or under personal control. [facility] board of pharmacy interface is reviewed.  No inconsistencies are found.  Her average MME is 62, active is 60, max active is 60.  This is within CDC guideline for chronic pain prescribing by primary care.    7. Recent cerebrovascular accident  Mild residual, overall doing well, taking the  mg daily. Rejects smoking cessation.     8. Severe major depressive disorder (CMS-HCC)  She states she is doing well. Her  feels she is doing quite well overall however she is very underweight. Her appetite is still okay, only eats once daily.  Discussed various snacks with her and her . She used to like cheese and Salami. I have encouraged him to start on this again.        Patient Active Problem List    Diagnosis Date Noted   • History of cerebrovascular accident with residual effects 01/01/2010     Priority: High   • Protein calorie malnutrition (CMS-HCC) 07/03/2017   • Osteopenia of right thigh 05/04/2017   • Hypokalemia 04/05/2017   • Sacral fracture, closed (CMS-HCC) 04/04/2017   • Fall 04/03/2017   • Intractable pain 04/03/2017   • Severe major depressive disorder (CMS-HCC) 01/06/2017   • Claudication of right lower extremity (CMS-HCC) 12/02/2016   • Iliac artery stenosis, right (CMS-HCC)    • RA (rheumatoid arthritis) (CMS-HCC) 05/21/2016   • Chronic back pain 05/21/2016   • Hypertension 05/21/2016   • Unintended weight loss 05/21/2016   • Cerebrovascular accident (CVA)  with involvement of right side of body (CMS-HCC) 05/20/2016   • 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   • Abdominal aortic atherosclerosis (CMS-HCC) 11/10/2015   • 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   • Chronic use of opiate for therapeutic purpose 11/10/2015   • Fracture of vertebra due to osteoporosis with routine healing 08/14/2015   • Tobacco dependence 01/14/2015   • Vertebrogenic pain syndrome    • MEDICAL HOME 10/10/2012   • Postmenopausal osteoporosis    • Vitamin D deficiency disease    • Dyslipidemia, goal LDL below 100    • Essential hypertension 02/04/2010   • Bilateral carotid artery stenosis    • Rheumatoid arthritis involving multiple joints (CMS-HCC)        Current medicines (including changes today)  Current Outpatient Prescriptions   Medication Sig Dispense Refill   • diltiazem CD (CARTIA XT) 180 MG CAPSULE SR 24 HR Take 1 Cap by mouth every day. 90 Cap 3   • trazodone (DESYREL) 100 MG Tab Take 1 Tab by mouth every bedtime. 90 Tab 3   • atorvastatin (LIPITOR) 40 MG Tab Take 1 Tab by mouth every bedtime. 90 Tab 3   • mirtazapine (REMERON) 15 MG Tab Take 1 Tab by mouth every bedtime. 90 Tab 3   • [START ON 3/22/2018] hydrocodone/acetaminophen (NORCO)  MG Tab Take 1 Tab by mouth every four hours as needed for Moderate Pain or Severe Pain for up to 30 days. 180 Tab 0   • aspirin  MG Tablet Delayed Response Take 1 Tab by mouth every day. 100 Tab 3   • lisinopril (PRINIVIL, ZESTRIL) 40 MG tablet Take 1 Tab by mouth every day. 90 Tab 3   • clopidogrel (PLAVIX) 75 MG Tab Take 1 Tab by mouth every day. 90 Tab 3   • predniSONE (DELTASONE) 5 MG Tab Take 5 mg by mouth every day.     • folic acid (FOLVITE) 1 MG TABS Take 2 mg by mouth every day.       No current facility-administered medications for this visit.        Allergies   Allergen Reactions   • Gold Hives   • Hydrochlorothiazide [Hctz] Itching   • Imuran [Azathioprine Sodium] Vomiting   • Naproxen Itching   • Azathioprine   "    DOES NOT REMEMBER REACTIOON   • Hydrochlorothiazide    • Naproxen    • Relafen [Nabumetone]      Did not work   • Tape      Paper tape is ok       ROS: As per HPI       Objective:     Blood pressure (!) 162/84, pulse 70, temperature 37.8 °C (100 °F), resp. rate 14, height 1.6 m (5' 3\"), weight 39.5 kg (87 lb), SpO2 96 %. Body mass index is 15.41 kg/m².    Physical Exam:  Constitutional: Well-developed and well-nourished. Not diaphoretic. No distress. Lucid and fluent.  Skin: Skin is warm and dry. No rash noted.  Head: Atraumatic without lesions.  Eyes: Conjunctivae and extraocular motions are normal. Pupils are equal, round, and reactive to light. No scleral icterus.   Neck: Supple, trachea midline. No thyromegaly present. No cervical or supraclavicular lymphadenopathy. No JVD or carotid bruits appreciated  Cardiovascular: Regular rate and rhythm.  Normal S1, S2 without murmur appreciated.  Chest: Effort normal. Clear to auscultation throughout. No adventitious sounds.   Abdomen: Soft, non tender, and without distention. Active bowel sounds in all four quadrants. No rebound, guarding, masses or hepatosplenomegaly.  Extremities: No cyanosis, clubbing, erythema, nor edema. Some tenderness on palpation of vertebral spinous processes, lumbosacral junction and SI joints bilaterally. There is marked distortion of the joints of her hands and some erythema and heat at the MCP joints on the left. This is felt to represent rheumatoid arthritis flare.  Neurological: Alert and oriented x 3.   Psychiatric:  Behavior, mood, and affect are appropriate.  However, weight is down again.       Assessment and Plan:     72 y.o. female with the following issues:    1. Essential hypertension  diltiazem CD (CARTIA XT) 180 MG CAPSULE SR 24 HR    aspirin  MG Tablet Delayed Response   2. Vertebrogenic pain syndrome  hydrocodone/acetaminophen (NORCO)  MG Tab    DISCONTINUED: hydrocodone/acetaminophen (NORCO)  MG Tab    " DISCONTINUED: hydrocodone/acetaminophen (NORCO)  MG Tab    DISCONTINUED: hydrocodone/acetaminophen (NORCO)  MG Tab   3. Rheumatoid arthritis involving multiple sites with positive rheumatoid factor (CMS-HCC)  hydrocodone/acetaminophen (NORCO)  MG Tab    DISCONTINUED: hydrocodone/acetaminophen (NORCO)  MG Tab    DISCONTINUED: hydrocodone/acetaminophen (NORCO)  MG Tab    DISCONTINUED: hydrocodone/acetaminophen (NORCO)  MG Tab   4. Fracture of vertebra due to osteoporosis with routine healing  hydrocodone/acetaminophen (NORCO)  MG Tab    DISCONTINUED: hydrocodone/acetaminophen (NORCO)  MG Tab    DISCONTINUED: hydrocodone/acetaminophen (NORCO)  MG Tab    DISCONTINUED: hydrocodone/acetaminophen (NORCO)  MG Tab   5. Closed fracture of sacrum, unspecified portion of sacrum, sequela  hydrocodone/acetaminophen (NORCO)  MG Tab    DISCONTINUED: hydrocodone/acetaminophen (NORCO)  MG Tab    DISCONTINUED: hydrocodone/acetaminophen (NORCO)  MG Tab    DISCONTINUED: hydrocodone/acetaminophen (NORCO)  MG Tab   6. Rheumatoid arthritis involving multiple joints (CMS-HCC)  hydrocodone/acetaminophen (NORCO)  MG Tab    DISCONTINUED: hydrocodone/acetaminophen (NORCO)  MG Tab    DISCONTINUED: hydrocodone/acetaminophen (NORCO)  MG Tab    DISCONTINUED: hydrocodone/acetaminophen (NORCO)  MG Tab   7. Chronic use of opiate for therapeutic purpose  CONSENT FOR OPIATE PRESCRIPTION    CONTROLLED SUBSTANCE TREATMENT AGREEMENT   8. Recent cerebrovascular accident  atorvastatin (LIPITOR) 40 MG Tab    aspirin  MG Tablet Delayed Response   9. Severe major depressive disorder (CMS-HCC)  trazodone (DESYREL) 100 MG Tab    mirtazapine (REMERON) 15 MG Tab     Chronic pain recheck:   Last dose of controlled substance: this am  Chronic pain treated with hydrocodone/apap taken 4-5 times a day    She  reports that she does not drink alcohol.  She   reports that she does not use drugs.    Consequences of Chronic Opiate therapy:  (5 A's)  Analgesia: Compared to no treatment or prior treatment, pain is currently improved  Activity: improved  Adverse Events: She denies constipation, itchy skin, nausea and sedation  Aberrant Behaviors: She reports she is taking medication as prescribed and is not veering from agreed treatment regimen. There have been no inappropriate refills or lost/stolen meds reported.   Affect/Mood: Pain is impacting patient's mood.  Patient reports stable depression/anxiety.    Nonnarcotic treatments that are being used: SSRI/SNRI, topical agents and cannot tolerate NSAIDS due to GI history and stroke.   Treatment goals discussed.    Opioid Risk Score: 1    Interpretation of Opioid Risk Score   Score 0-3 = Low risk of abuse. Do UDS at least once per year.  Score 4-7 = Moderate risk of abuse. Do UDS 1-4 times per year.  Score 8+ = High risk of abuse. Refer to specialist.    Last order of CONTROLLED SUBSTANCE TREATMENT AGREEMENT was found on 1/4/2018 from Office Visit on 1/4/2018     UDS Summary                URINE DRUG SCREEN Next Due 6/28/2018      Done 7/3/2017 [facility] PAIN MANAGEMENT SCREEN     Patient has more history with this topic...        Most recent UDS reviewed today and is consistent with prescribed medications.    I have reviewed the medical records, the Prescription Monitoring Program and I have determined that controlled substance treatment is medically indicated.        Followup: No Follow-up on file.

## 2018-01-15 ENCOUNTER — TELEPHONE (OUTPATIENT)
Dept: MEDICAL GROUP | Facility: CLINIC | Age: 73
End: 2018-01-15

## 2018-01-15 NOTE — TELEPHONE ENCOUNTER
1. Caller Name: Aida alex (daughter) of Lakisha Bhatti                                         Call Back Number: [phone] (home)       Patient approves a detailed voicemail message: yes    Aida is requesting a call back from  because she is concerned about her moms weight.  Aida states she has a few questions about the weight and is scared of death.    Please advise

## 2018-02-15 ENCOUNTER — TELEPHONE (OUTPATIENT)
Dept: MEDICAL GROUP | Facility: CLINIC | Age: 73
End: 2018-02-15

## 2018-02-16 NOTE — TELEPHONE ENCOUNTER
Pt's daughter is calling stating that her mom has significant rectal bleeding.  She asked me to call her father Nico back in regards to this.  There was no answer.  I left a msg for return call.

## 2018-02-16 NOTE — TELEPHONE ENCOUNTER
Called and unfortunately just reached voicemail on both numbers.  Did urge in general terms that if rectal bleeding is severe to go to the emergency room as this can be very serious.

## 2018-04-04 ENCOUNTER — OFFICE VISIT (OUTPATIENT)
Dept: MEDICAL GROUP | Facility: CLINIC | Age: 73
End: 2018-04-04
Payer: MEDICARE

## 2018-04-04 VITALS
RESPIRATION RATE: 18 BRPM | TEMPERATURE: 99 F | BODY MASS INDEX: 15.41 KG/M2 | DIASTOLIC BLOOD PRESSURE: 84 MMHG | SYSTOLIC BLOOD PRESSURE: 162 MMHG | HEIGHT: 63 IN | OXYGEN SATURATION: 95 % | HEART RATE: 67 BPM | WEIGHT: 87 LBS

## 2018-04-04 DIAGNOSIS — M80.08XD FRACTURE OF VERTEBRA DUE TO OSTEOPOROSIS WITH ROUTINE HEALING: ICD-10-CM

## 2018-04-04 DIAGNOSIS — Z86.69 HISTORY OF HEMIPLEGIA: ICD-10-CM

## 2018-04-04 DIAGNOSIS — I10 ESSENTIAL HYPERTENSION: ICD-10-CM

## 2018-04-04 DIAGNOSIS — I69.90 HISTORY OF CEREBROVASCULAR ACCIDENT WITH RESIDUAL EFFECTS: ICD-10-CM

## 2018-04-04 DIAGNOSIS — Z79.891 CHRONIC USE OF OPIATE FOR THERAPEUTIC PURPOSE: ICD-10-CM

## 2018-04-04 DIAGNOSIS — I74.2 BRACHIAL ARTERY THROMBUS (HCC): ICD-10-CM

## 2018-04-04 DIAGNOSIS — E44.1 MILD PROTEIN-CALORIE MALNUTRITION (HCC): ICD-10-CM

## 2018-04-04 DIAGNOSIS — M54.89 VERTEBROGENIC PAIN SYNDROME: ICD-10-CM

## 2018-04-04 DIAGNOSIS — S32.10XS CLOSED FRACTURE OF SACRUM, UNSPECIFIED PORTION OF SACRUM, SEQUELA: ICD-10-CM

## 2018-04-04 DIAGNOSIS — M06.9 RHEUMATOID ARTHRITIS INVOLVING MULTIPLE JOINTS (HCC): ICD-10-CM

## 2018-04-04 DIAGNOSIS — Z87.81 HISTORY OF FRACTURE OF PELVIS: ICD-10-CM

## 2018-04-04 DIAGNOSIS — F32.4 MAJOR DEPRESSIVE DISORDER WITH SINGLE EPISODE, IN PARTIAL REMISSION (HCC): ICD-10-CM

## 2018-04-04 DIAGNOSIS — M05.79 RHEUMATOID ARTHRITIS INVOLVING MULTIPLE SITES WITH POSITIVE RHEUMATOID FACTOR (HCC): ICD-10-CM

## 2018-04-04 PROCEDURE — 99214 OFFICE O/P EST MOD 30 MIN: CPT | Performed by: FAMILY MEDICINE

## 2018-04-04 RX ORDER — CLOPIDOGREL BISULFATE 75 MG/1
75 TABLET ORAL DAILY
Qty: 90 TAB | Refills: 3 | Status: SHIPPED | OUTPATIENT
Start: 2018-04-04 | End: 2019-03-28

## 2018-04-04 RX ORDER — HYDROCODONE BITARTRATE AND ACETAMINOPHEN 10; 325 MG/1; MG/1
1 TABLET ORAL EVERY 4 HOURS PRN
Qty: 180 TAB | Refills: 0 | Status: SHIPPED | OUTPATIENT
Start: 2018-05-21 | End: 2018-04-04 | Stop reason: SDUPTHER

## 2018-04-04 RX ORDER — HYDROCODONE BITARTRATE AND ACETAMINOPHEN 10; 325 MG/1; MG/1
1 TABLET ORAL EVERY 4 HOURS PRN
Qty: 180 TAB | Refills: 0 | Status: SHIPPED | OUTPATIENT
Start: 2018-04-21 | End: 2018-04-04 | Stop reason: SDUPTHER

## 2018-04-04 RX ORDER — HYDROCODONE BITARTRATE AND ACETAMINOPHEN 10; 325 MG/1; MG/1
1 TABLET ORAL EVERY 4 HOURS PRN
Qty: 180 TAB | Refills: 0 | Status: SHIPPED | OUTPATIENT
Start: 2018-06-20 | End: 2018-07-03 | Stop reason: SDUPTHER

## 2018-04-04 ASSESSMENT — PAIN SCALES - GENERAL: PAINLEVEL: NO PAIN

## 2018-05-22 ENCOUNTER — HOSPITAL ENCOUNTER (OUTPATIENT)
Dept: LAB | Facility: MEDICAL CENTER | Age: 73
End: 2018-05-22
Attending: FAMILY MEDICINE
Payer: MEDICARE

## 2018-05-22 DIAGNOSIS — E44.1 MILD PROTEIN-CALORIE MALNUTRITION (HCC): ICD-10-CM

## 2018-05-22 DIAGNOSIS — I69.90 HISTORY OF CEREBROVASCULAR ACCIDENT WITH RESIDUAL EFFECTS: ICD-10-CM

## 2018-05-22 DIAGNOSIS — F32.4 MAJOR DEPRESSIVE DISORDER WITH SINGLE EPISODE, IN PARTIAL REMISSION (HCC): ICD-10-CM

## 2018-05-22 DIAGNOSIS — I10 ESSENTIAL HYPERTENSION: ICD-10-CM

## 2018-05-22 LAB
ALBUMIN SERPL BCP-MCNC: 3.9 G/DL (ref 3.2–4.9)
ALBUMIN/GLOB SERPL: 1.3 G/DL
ALP SERPL-CCNC: 73 U/L (ref 30–99)
ALT SERPL-CCNC: 5 U/L (ref 2–50)
ANION GAP SERPL CALC-SCNC: 6 MMOL/L (ref 0–11.9)
AST SERPL-CCNC: 14 U/L (ref 12–45)
BILIRUB SERPL-MCNC: 0.6 MG/DL (ref 0.1–1.5)
BUN SERPL-MCNC: 19 MG/DL (ref 8–22)
CALCIUM SERPL-MCNC: 9.2 MG/DL (ref 8.5–10.5)
CHLORIDE SERPL-SCNC: 106 MMOL/L (ref 96–112)
CHOLEST SERPL-MCNC: 146 MG/DL (ref 100–199)
CO2 SERPL-SCNC: 26 MMOL/L (ref 20–33)
CREAT SERPL-MCNC: 0.56 MG/DL (ref 0.5–1.4)
ERYTHROCYTE [DISTWIDTH] IN BLOOD BY AUTOMATED COUNT: 55.2 FL (ref 35.9–50)
GLOBULIN SER CALC-MCNC: 3 G/DL (ref 1.9–3.5)
GLUCOSE SERPL-MCNC: 78 MG/DL (ref 65–99)
HCT VFR BLD AUTO: 45.4 % (ref 37–47)
HDLC SERPL-MCNC: 46 MG/DL
HGB BLD-MCNC: 13.4 G/DL (ref 12–16)
LDLC SERPL CALC-MCNC: 74 MG/DL
MCH RBC QN AUTO: 26.7 PG (ref 27–33)
MCHC RBC AUTO-ENTMCNC: 29.5 G/DL (ref 33.6–35)
MCV RBC AUTO: 90.6 FL (ref 81.4–97.8)
PLATELET # BLD AUTO: 213 K/UL (ref 164–446)
PMV BLD AUTO: 10.2 FL (ref 9–12.9)
POTASSIUM SERPL-SCNC: 3.6 MMOL/L (ref 3.6–5.5)
PROT SERPL-MCNC: 6.9 G/DL (ref 6–8.2)
RBC # BLD AUTO: 5.01 M/UL (ref 4.2–5.4)
SODIUM SERPL-SCNC: 138 MMOL/L (ref 135–145)
TRIGL SERPL-MCNC: 130 MG/DL (ref 0–149)
TSH SERPL DL<=0.005 MIU/L-ACNC: 1.08 UIU/ML (ref 0.38–5.33)
WBC # BLD AUTO: 6.4 K/UL (ref 4.8–10.8)

## 2018-05-22 PROCEDURE — 36415 COLL VENOUS BLD VENIPUNCTURE: CPT

## 2018-05-22 PROCEDURE — 84443 ASSAY THYROID STIM HORMONE: CPT

## 2018-05-22 PROCEDURE — 80053 COMPREHEN METABOLIC PANEL: CPT

## 2018-05-22 PROCEDURE — 85027 COMPLETE CBC AUTOMATED: CPT

## 2018-05-22 PROCEDURE — 80061 LIPID PANEL: CPT

## 2018-07-03 ENCOUNTER — OFFICE VISIT (OUTPATIENT)
Dept: MEDICAL GROUP | Facility: CLINIC | Age: 73
End: 2018-07-03
Payer: MEDICARE

## 2018-07-03 VITALS
DIASTOLIC BLOOD PRESSURE: 76 MMHG | TEMPERATURE: 99.3 F | BODY MASS INDEX: 14.18 KG/M2 | RESPIRATION RATE: 14 BRPM | HEART RATE: 73 BPM | OXYGEN SATURATION: 94 % | SYSTOLIC BLOOD PRESSURE: 122 MMHG | HEIGHT: 63 IN | WEIGHT: 80 LBS

## 2018-07-03 DIAGNOSIS — R63.4 UNINTENDED WEIGHT LOSS: ICD-10-CM

## 2018-07-03 DIAGNOSIS — Z79.891 CHRONIC USE OF OPIATE FOR THERAPEUTIC PURPOSE: ICD-10-CM

## 2018-07-03 DIAGNOSIS — M54.89 VERTEBROGENIC PAIN SYNDROME: ICD-10-CM

## 2018-07-03 DIAGNOSIS — R14.0 DISTENDED ABDOMEN: ICD-10-CM

## 2018-07-03 DIAGNOSIS — F32.2 SEVERE MAJOR DEPRESSIVE DISORDER (HCC): ICD-10-CM

## 2018-07-03 DIAGNOSIS — M05.79 RHEUMATOID ARTHRITIS INVOLVING MULTIPLE SITES WITH POSITIVE RHEUMATOID FACTOR (HCC): ICD-10-CM

## 2018-07-03 DIAGNOSIS — M80.08XD FRACTURE OF VERTEBRA DUE TO OSTEOPOROSIS WITH ROUTINE HEALING: ICD-10-CM

## 2018-07-03 PROCEDURE — 99213 OFFICE O/P EST LOW 20 MIN: CPT | Performed by: FAMILY MEDICINE

## 2018-07-03 RX ORDER — HYDROCODONE BITARTRATE AND ACETAMINOPHEN 10; 325 MG/1; MG/1
1 TABLET ORAL EVERY 4 HOURS PRN
Qty: 180 TAB | Refills: 0 | Status: SHIPPED | OUTPATIENT
Start: 2018-09-17 | End: 2018-10-01 | Stop reason: SDUPTHER

## 2018-07-03 RX ORDER — HYDROCODONE BITARTRATE AND ACETAMINOPHEN 10; 325 MG/1; MG/1
1 TABLET ORAL EVERY 4 HOURS PRN
Qty: 180 TAB | Refills: 0 | Status: SHIPPED | OUTPATIENT
Start: 2018-07-19 | End: 2018-07-03 | Stop reason: SDUPTHER

## 2018-07-03 RX ORDER — HYDROCODONE BITARTRATE AND ACETAMINOPHEN 10; 325 MG/1; MG/1
1 TABLET ORAL EVERY 4 HOURS PRN
Qty: 180 TAB | Refills: 0 | Status: SHIPPED | OUTPATIENT
Start: 2018-07-03 | End: 2018-07-03 | Stop reason: SDUPTHER

## 2018-07-03 RX ORDER — TRAZODONE HYDROCHLORIDE 100 MG/1
100 TABLET ORAL
Qty: 90 TAB | Refills: 3 | Status: SHIPPED | OUTPATIENT
Start: 2018-07-03 | End: 2019-03-28 | Stop reason: SDUPTHER

## 2018-07-03 RX ORDER — HYDROCODONE BITARTRATE AND ACETAMINOPHEN 10; 325 MG/1; MG/1
1 TABLET ORAL EVERY 4 HOURS PRN
Qty: 180 TAB | Refills: 0 | Status: SHIPPED | OUTPATIENT
Start: 2018-08-18 | End: 2018-07-03 | Stop reason: SDUPTHER

## 2018-07-03 NOTE — PROGRESS NOTES
Chief Complaint   Patient presents with   • Weight Loss   • Arthritis   • Back Pain       Subjective:     HPI:   Lakisha Bhatti presents today with the followin. Vertebrogenic pain syndrome  She has chronic arthritis in the vertebrae as well as multiple areas in her body.  She has had some chronic slow fracturing and osteoporosis which is in part due to her chronic prednisone from her rheumatoid arthritis.  She continues to follow with rheumatology but they have not found an antirheumatic regimen that she can tolerate.  The hydrocodone/APAP continues to be helpful she says bringing the pain from a 9 or 10 down to 7 and often a little better.    2. Rheumatoid arthritis involving multiple sites with positive rheumatoid factor (HCC)  She has long-standing rheumatoid arthritis and follows with rheumatology.  Her rheumatologist has tried several different regimens with unfortunately no good success for her.  Several of the regimens have resulted in dramatic weight loss.  Patient is now on low-dose prednisone.  She continues to see him on a regular basis.    3. Fracture of vertebra due to osteoporosis with routine healing  This has been relatively stable.  She feels this pain is better.    4. Chronic use of opiate for therapeutic purpose  No aberrant or addictive use of medications has been observed.  No adverse events have been reported.  Patient counseled to not add Tylenol to current regimen.  Patient counseled to keep medications locked up or under personal control.  [facility] board of pharmacy interface is reviewed.  No inconsistencies are found.  Her average MME is 62, active is 60, max active is 60.  This is within CDC guideline for chronic pain prescribing by primary care.    5. Severe major depressive disorder (HCC)  She has severe chronic depression.  Have discussed this with her many times in the past.  Both trazodone for sleep and mirtazapine for depression have been prescribed.  She is not  consistently taking these it appears.  She did not want to take any nighttime medications.  However, she complains of unremitting insomnia and anxiety.  Have persuaded her to give the trazodone a try again.  If she tolerates this perhaps she will be willing to go back on the mirtazapine as well.  I am concerned about her persistent weight loss which I attributed at least in part to her depression.    6. Unintended weight loss/Distended abdomen  She continues to lose despite boost at least one daily, usually two.  She is snacking a lot.  She is however having distention of the lower abdomen which is sometimes very firm.  This has been present off and on but has really escalated in the last couple of months.  Discussed obtaining an ultrasound especially in view of her continued weight loss.      Patient Active Problem List    Diagnosis Date Noted   • History of cerebrovascular accident with residual effects 01/01/2010     Priority: High   • Protein calorie malnutrition ([facility]) 07/03/2017   • Osteopenia of right thigh 05/04/2017   • Hypokalemia 04/05/2017   • Sacral fracture, closed ([facility]) 04/04/2017   • Fall 04/03/2017   • Intractable pain 04/03/2017   • Major depressive disorder with single episode, in partial remission ([facility]) 01/06/2017   • Claudication of right lower extremity ([facility]) 12/02/2016   • Iliac artery stenosis, right ([facility])    • Rheumatoid arthritis involving multiple sites with positive rheumatoid factor (CMS-HCC) 05/21/2016   • Chronic back pain 05/21/2016   • Hypertension 05/21/2016   • Unintended weight loss 05/21/2016   • Cerebrovascular accident (CVA) with involvement of right side of body ([facility]) 05/20/2016   • 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[facility]) 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   • Abdominal aortic atherosclerosis ([facility]) 11/10/2015   • 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[facility]) 11/10/2015   • Chronic use of opiate for therapeutic purpose 11/10/2015   • Fracture of vertebra due to osteoporosis with routine healing 08/14/2015   • Tobacco  "dependence 01/14/2015   • Vertebrogenic pain syndrome    • MEDICAL HOME 10/10/2012   • Postmenopausal osteoporosis    • Vitamin D deficiency disease    • Dyslipidemia, goal LDL below 100    • Essential hypertension 02/04/2010   • Bilateral carotid artery stenosis    • Rheumatoid arthritis involving multiple joints (HCC)        Current medicines (including changes today)  Current Outpatient Prescriptions   Medication Sig Dispense Refill   • traZODone (DESYREL) 100 MG Tab Take 1 Tab by mouth every bedtime. 90 Tab 3   • [START ON 9/17/2018] HYDROcodone/acetaminophen (NORCO)  MG Tab Take 1 Tab by mouth every four hours as needed for Moderate Pain or Severe Pain for up to 30 days. 180 Tab 0   • clopidogrel (PLAVIX) 75 MG Tab Take 1 Tab by mouth every day. 90 Tab 3   • diltiazem CD (CARTIA XT) 180 MG CAPSULE SR 24 HR Take 1 Cap by mouth every day. 90 Cap 3   • atorvastatin (LIPITOR) 40 MG Tab Take 1 Tab by mouth every bedtime. 90 Tab 3   • mirtazapine (REMERON) 15 MG Tab Take 1 Tab by mouth every bedtime. 90 Tab 3   • aspirin  MG Tablet Delayed Response Take 1 Tab by mouth every day. 100 Tab 3   • lisinopril (PRINIVIL, ZESTRIL) 40 MG tablet Take 1 Tab by mouth every day. 90 Tab 3   • predniSONE (DELTASONE) 5 MG Tab Take 5 mg by mouth every day.     • folic acid (FOLVITE) 1 MG TABS Take 2 mg by mouth every day.       No current facility-administered medications for this visit.        Allergies   Allergen Reactions   • Gold Hives   • Hydrochlorothiazide [Hctz] Itching   • Imuran [Azathioprine Sodium] Vomiting   • Naproxen Itching   • Azathioprine      DOES NOT REMEMBER REACTIOON   • Hydrochlorothiazide    • Naproxen    • Relafen [Nabumetone]      Did not work   • Tape      Paper tape is ok       ROS: As per HPI       Objective:     Blood pressure 122/76, pulse 73, temperature 37.4 °C (99.3 °F), resp. rate 14, height 1.6 m (5' 3\"), weight 36.3 kg (80 lb), SpO2 94 %. Body mass index is 14.17 kg/m².    Physical " Exam:  Constitutional: Well-developed and well-nourished. Not diaphoretic. No distress. Lucid and fluent.  Skin: Skin is warm and dry. No rash noted.  Head: Atraumatic without lesions.  Eyes: Conjunctivae and extraocular motions are normal. Pupils are equal, round, and reactive to light. No scleral icterus.   Ears:  External ears unremarkable. Tympanic membranes clear and intact.  Nose: Nares patent. Mucosa without edema or erythema. No discharge. No facial tenderness.  Mouth/Throat: Tongue normal. Oropharynx is clear and moist. Posterior pharynx without erythema or exudates.  Neck: Supple, trachea midline. No thyromegaly present. No cervical or supraclavicular lymphadenopathy. No JVD or carotid bruits appreciated  Cardiovascular: Regular rate and rhythm.  Normal S1, S2 without murmur appreciated.  Chest: Effort normal. Clear to auscultation throughout. No adventitious sounds.   Abdomen: Soft, non tender, and with marked lower abdominal distention. Active bowel sounds in all four quadrants. No rebound, guarding, masses or hepatosplenomegaly.  Extremities: No cyanosis, clubbing, erythema, nor edema. Fingers show marked rheumatoid distortion.  Neurological: Alert and oriented x 3. No tremor.  Psychiatric:  Behavior, mood, and affect are appropriate.       Assessment and Plan:     72 y.o. female with the following issues:    1. Vertebrogenic pain syndrome  HYDROcodone/acetaminophen (NORCO)  MG Tab    DISCONTINUED: HYDROcodone/acetaminophen (NORCO)  MG Tab    DISCONTINUED: HYDROcodone/acetaminophen (NORCO)  MG Tab    DISCONTINUED: HYDROcodone/acetaminophen (NORCO)  MG Tab   2. Rheumatoid arthritis involving multiple sites with positive rheumatoid factor (HCC)  HYDROcodone/acetaminophen (NORCO)  MG Tab    DISCONTINUED: HYDROcodone/acetaminophen (NORCO)  MG Tab    DISCONTINUED: HYDROcodone/acetaminophen (NORCO)  MG Tab    DISCONTINUED: HYDROcodone/acetaminophen (NORCO)  MG  Tab   3. Fracture of vertebra due to osteoporosis with routine healing  HYDROcodone/acetaminophen (NORCO)  MG Tab    DISCONTINUED: HYDROcodone/acetaminophen (NORCO)  MG Tab    DISCONTINUED: HYDROcodone/acetaminophen (NORCO)  MG Tab    DISCONTINUED: HYDROcodone/acetaminophen (NORCO)  MG Tab   4. Chronic use of opiate for therapeutic purpose  [facility] PAIN MANAGEMENT SCREEN   5. Severe major depressive disorder (HCC)  traZODone (DESYREL) 100 MG Tab   6. Unintended weight loss  US-ABDOMEN COMPLETE SURVEY   7. Distended abdomen  US-ABDOMEN COMPLETE SURVEY     Chronic pain recheck:   Last dose of controlled substance: This morning Your chemistry panel and cholesterol test are normal.  Chronic pain treated with hydrocodone APAP taken 4-6 times a day    She  reports that she does not drink alcohol.  She  reports that she does not use drugs.    Consequences of Chronic Opiate therapy:  (5 A's)  Analgesia: Compared to no treatment or prior treatment, pain is currently improved  Activity: improved but finds she is weaker  Adverse Events: She denies itchy skin, nausea and sedation  Aberrant Behaviors: She reports she is taking medication as prescribed and is not veering from agreed treatment regimen. There have been no inappropriate refills or lost/stolen meds reported.   Affect/Mood: Pain is impacting patient's mood.  Patient reports continued depression/anxiety.    Nonnarcotic treatments that are being used: prednisone is helpful.   Treatment goals discussed.    Opioid Risk Score: 1    Interpretation of Opioid Risk Score   Score 0-3 = Low risk of abuse. Do UDS at least once per year.  Score 4-7 = Moderate risk of abuse. Do UDS 1-4 times per year.  Score 8+ = High risk of abuse. Refer to specialist.    Last order of CONTROLLED SUBSTANCE TREATMENT AGREEMENT was found on 1/4/2018 from Office Visit on 1/4/2018     UDS Summary                URINE DRUG SCREEN Overdue 6/28/2018      Done 7/3/2017  [facility] PAIN MANAGEMENT SCREEN     Patient has more history with this topic...        Most recent UDS reviewed today and is consistent with prescribed medications.  Urine drug screen obtained today.    I have reviewed the medical records, the Prescription Monitoring Program and I have determined that controlled substance treatment is medically indicated.        Followup: Return in about 3 months (around 10/3/2018), or if symptoms worsen or fail to improve.

## 2018-07-09 ENCOUNTER — TELEPHONE (OUTPATIENT)
Dept: MEDICAL GROUP | Facility: CLINIC | Age: 73
End: 2018-07-09

## 2018-07-09 DIAGNOSIS — I10 ESSENTIAL HYPERTENSION: ICD-10-CM

## 2018-07-09 RX ORDER — LISINOPRIL 40 MG/1
40 TABLET ORAL DAILY
Qty: 90 TAB | Refills: 3 | Status: SHIPPED | OUTPATIENT
Start: 2018-07-09 | End: 2019-03-21 | Stop reason: SDUPTHER

## 2018-07-09 NOTE — TELEPHONE ENCOUNTER
1. Caller Name: Lakisha Bhatti                                           Call Back Number: [phone] (home)         Patient approves a detailed voicemail message: yes    Patient is calling to have her lisinopril sen to the [facility]

## 2018-07-19 ENCOUNTER — APPOINTMENT (OUTPATIENT)
Dept: RADIOLOGY | Facility: MEDICAL CENTER | Age: 73
End: 2018-07-19
Attending: FAMILY MEDICINE
Payer: MEDICARE

## 2018-10-01 ENCOUNTER — OFFICE VISIT (OUTPATIENT)
Dept: MEDICAL GROUP | Facility: MEDICAL CENTER | Age: 73
End: 2018-10-01
Payer: MEDICARE

## 2018-10-01 VITALS
HEART RATE: 74 BPM | WEIGHT: 83 LBS | DIASTOLIC BLOOD PRESSURE: 80 MMHG | BODY MASS INDEX: 14.71 KG/M2 | TEMPERATURE: 99.3 F | RESPIRATION RATE: 16 BRPM | HEIGHT: 63 IN | SYSTOLIC BLOOD PRESSURE: 168 MMHG | OXYGEN SATURATION: 95 %

## 2018-10-01 DIAGNOSIS — M80.08XD FRACTURE OF VERTEBRA DUE TO OSTEOPOROSIS WITH ROUTINE HEALING: ICD-10-CM

## 2018-10-01 DIAGNOSIS — Z79.891 CHRONIC USE OF OPIATE FOR THERAPEUTIC PURPOSE: ICD-10-CM

## 2018-10-01 DIAGNOSIS — Z23 NEED FOR IMMUNIZATION AGAINST INFLUENZA: ICD-10-CM

## 2018-10-01 DIAGNOSIS — M05.79 RHEUMATOID ARTHRITIS INVOLVING MULTIPLE SITES WITH POSITIVE RHEUMATOID FACTOR (HCC): ICD-10-CM

## 2018-10-01 DIAGNOSIS — M54.89 VERTEBROGENIC PAIN SYNDROME: ICD-10-CM

## 2018-10-01 PROCEDURE — 99213 OFFICE O/P EST LOW 20 MIN: CPT | Mod: 25 | Performed by: FAMILY MEDICINE

## 2018-10-01 PROCEDURE — G0008 ADMIN INFLUENZA VIRUS VAC: HCPCS | Performed by: FAMILY MEDICINE

## 2018-10-01 PROCEDURE — 90662 IIV NO PRSV INCREASED AG IM: CPT | Performed by: FAMILY MEDICINE

## 2018-10-01 RX ORDER — HYDROCODONE BITARTRATE AND ACETAMINOPHEN 10; 325 MG/1; MG/1
1 TABLET ORAL EVERY 4 HOURS PRN
Qty: 180 TAB | Refills: 0 | Status: SHIPPED | OUTPATIENT
Start: 2018-11-15 | End: 2018-10-01 | Stop reason: SDUPTHER

## 2018-10-01 RX ORDER — HYDROCODONE BITARTRATE AND ACETAMINOPHEN 10; 325 MG/1; MG/1
1 TABLET ORAL EVERY 4 HOURS PRN
Qty: 180 TAB | Refills: 0 | Status: SHIPPED | OUTPATIENT
Start: 2018-12-15 | End: 2018-12-28 | Stop reason: SDUPTHER

## 2018-10-01 RX ORDER — HYDROCODONE BITARTRATE AND ACETAMINOPHEN 10; 325 MG/1; MG/1
1 TABLET ORAL EVERY 4 HOURS PRN
Qty: 180 TAB | Refills: 0 | Status: SHIPPED | OUTPATIENT
Start: 2018-10-16 | End: 2018-10-01 | Stop reason: SDUPTHER

## 2018-10-01 NOTE — PROGRESS NOTES
Chief Complaint   Patient presents with   • Arthritis   • Back Pain   • Flu Vaccine       Subjective:     HPI:   Lakisha Bhatti presents today with the followin. Vertebrogenic pain syndrome  She does have very thin bones and has had multiple microfractures.  Patient has been on long-term steroids as patient was recently on another steroid burst and will be following up soon with her rheumatologist.  Very little has been found otherwise that will help with her rheumatoid arthritis.  Patient states that the hydrocodone/APAP will generally b bring her pain from a 9 or a 10 down to a 7 and sometimes better.  Denies significant somnolence or confusion from the medication.  Patient has long-standing rheumatoid arthritis    2. Rheumatoid arthritis involving multiple sites with positive rheumatoid factor (HCC)  Which is quite severe but has not been able to tolerate infusion therapy from her rheumatologist.  Patient continues on the current prednisone regimen.  Hydrocodone is there to allow her to manage the pain to the point where she can complete her ADLs    3. Fracture of vertebra due to osteoporosis with routine healing  The patient states she feels the fracture has now healed relatively well and she is no longer in as severe back pain as she was.  She and her rheumatologist are trying to avoid steroids as they do not want another fracture.    4. Chronic use of opiate for therapeutic purpose  No aberrant or addictive use of medications has been observed.  No adverse events have been reported.  Patient counseled to not add Tylenol to current regimen.  Patient counseled to keep medications locked up or under personal control.  [facility] board of pharmacy interface is reviewed.  No inconsistencies are found.  Her average MME is 62, active is 60, max active is 60.  This is within CDC guideline for chronic pain prescribing by primary care.    5. Need for immunization against influenza  Administered  today        Patient Active Problem List    Diagnosis Date Noted   • History of cerebrovascular accident with residual effects 01/01/2010     Priority: High   • Protein calorie malnutrition ([facility]) 07/03/2017   • Osteopenia of right thigh 05/04/2017   • Hypokalemia 04/05/2017   • Sacral fracture, closed ([facility]) 04/04/2017   • Fall 04/03/2017   • Intractable pain 04/03/2017   • Major depressive disorder with single episode, in partial remission ([facility]) 01/06/2017   • Claudication of right lower extremity ([facility]) 12/02/2016   • Iliac artery stenosis, right ([facility])    • Rheumatoid arthritis involving multiple sites with positive rheumatoid factor ([facility]) 05/21/2016   • Chronic back pain 05/21/2016   • Hypertension 05/21/2016   • Unintended weight loss 05/21/2016   • Cerebrovascular accident (CVA) with involvement of right side of body ([facility]) 05/20/2016   • 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   • Abdominal aortic atherosclerosis ([facility]) 11/10/2015   • 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   • Chronic use of opiate for therapeutic purpose 11/10/2015   • Fracture of vertebra due to osteoporosis with routine healing 08/14/2015   • Tobacco dependence 01/14/2015   • Vertebrogenic pain syndrome    • MEDICAL HOME 10/10/2012   • Postmenopausal osteoporosis    • Vitamin D deficiency disease    • Dyslipidemia, goal LDL below 100    • Essential hypertension 02/04/2010   • Bilateral carotid artery stenosis    • Rheumatoid arthritis involving multiple joints ([facility])        Current medicines (including changes today)  Current Outpatient Prescriptions   Medication Sig Dispense Refill   • [START ON 12/15/2018] HYDROcodone/acetaminophen (NORCO)  MG Tab Take 1 Tab by mouth every four hours as needed for Moderate Pain or Severe Pain for up to 30 days. 180 Tab 0   • lisinopril (PRINIVIL, ZESTRIL) 40 MG tablet Take 1 Tab by mouth every day. 90 Tab 3   • clopidogrel (PLAVIX) 75 MG Tab Take 1 Tab by mouth every day. 90 Tab 3   • diltiazem  "CD (CARTIA XT) 180 MG CAPSULE SR 24 HR Take 1 Cap by mouth every day. 90 Cap 3   • atorvastatin (LIPITOR) 40 MG Tab Take 1 Tab by mouth every bedtime. 90 Tab 3   • mirtazapine (REMERON) 15 MG Tab Take 1 Tab by mouth every bedtime. 90 Tab 3   • aspirin  MG Tablet Delayed Response Take 1 Tab by mouth every day. 100 Tab 3   • predniSONE (DELTASONE) 5 MG Tab Take 5 mg by mouth every day.     • folic acid (FOLVITE) 1 MG TABS Take 2 mg by mouth every day.     • traZODone (DESYREL) 100 MG Tab Take 1 Tab by mouth every bedtime. 90 Tab 3     No current facility-administered medications for this visit.        Allergies   Allergen Reactions   • Gold Hives   • Hydrochlorothiazide [Hctz] Itching   • Imuran [Azathioprine Sodium] Vomiting   • Naproxen Itching   • Azathioprine      DOES NOT REMEMBER REACTIOON   • Hydrochlorothiazide    • Naproxen    • Relafen [Nabumetone]      Did not work   • Tape      Paper tape is ok       ROS: As per HPI       Objective:     Blood pressure (!) 168/80, pulse 74, temperature 37.4 °C (99.3 °F), resp. rate 16, height 1.6 m (5' 3\"), weight 37.6 kg (83 lb), SpO2 95 %, not currently breastfeeding. Body mass index is 14.7 kg/m².    Physical Exam:  Constitutional: Well-developed.  She is still very thin. Not diaphoretic. No distress. Lucid and fluent.  Seems happier and more interactive today.  Skin: Skin is warm and dry. No rash noted.  Head: Atraumatic without lesions.  Eyes: Conjunctivae and extraocular motions are normal. Pupils are equal, round, and reactive to light. No scleral icterus.   Ears:  External ears unremarkable. Tympanic membranes clear and intact.  Nose: Nares patent. Mucosa without edema or erythema. No discharge. No facial tenderness.  Mouth/Throat: Tongue normal. Oropharynx is clear and moist. Posterior pharynx without erythema or exudates.  Neck: Her back and neck show very significant kyphosis. No thyromegaly present. No cervical or supraclavicular lymphadenopathy. No JVD or " carotid bruits appreciated  Cardiovascular: Regular rate and rhythm.  Normal S1, S2 without murmur appreciated.  Chest: Effort normal. Clear to auscultation throughout. No adventitious sounds.   Abdomen: Soft, non tender, and without distention. Active bowel sounds in all four quadrants. No rebound, guarding, masses or hepatosplenomegaly.  Extremities: No cyanosis, clubbing, erythema, nor edema.  Patient has multiple areas of joint destruction especially in her MCP joints bilaterally.  Neurological: Alert and oriented x 3.  Gait is very slow with flattening of the lordotic curve.  Psychiatric:  Behavior, mood, and affect are appropriate.       Assessment and Plan:     72 y.o. female with the following issues:    1. Vertebrogenic pain syndrome  HYDROcodone/acetaminophen (NORCO)  MG Tab    DISCONTINUED: HYDROcodone/acetaminophen (NORCO)  MG Tab    DISCONTINUED: HYDROcodone/acetaminophen (NORCO)  MG Tab   2. Rheumatoid arthritis involving multiple sites with positive rheumatoid factor (HCC)  HYDROcodone/acetaminophen (NORCO)  MG Tab    DISCONTINUED: HYDROcodone/acetaminophen (NORCO)  MG Tab    DISCONTINUED: HYDROcodone/acetaminophen (NORCO)  MG Tab   3. Fracture of vertebra due to osteoporosis with routine healing  HYDROcodone/acetaminophen (NORCO)  MG Tab    DISCONTINUED: HYDROcodone/acetaminophen (NORCO)  MG Tab    DISCONTINUED: HYDROcodone/acetaminophen (NORCO)  MG Tab   4. Chronic use of opiate for therapeutic purpose  Consent for Opiate Prescription   5. Need for immunization against influenza  Influenza Vaccine, High Dose (65+ Only)     Chronic pain recheck:   Last dose of controlled substance: This morning  Chronic pain treated with hydrocodone APAP taken 4-5 times a day    She  reports that she does not drink alcohol.  She  reports that she does not use drugs.    Consequences of Chronic Opiate therapy:  (5 A's)  Analgesia: Compared to no treatment or prior  treatment, pain is currently improved  Activity: improved  Adverse Events: She denies constipation, itchy skin, nausea and sedation  Aberrant Behaviors: She reports she is taking medication as prescribed and is not veering from agreed treatment regimen. There have been no inappropriate refills or lost/stolen meds reported.   Affect/Mood: Pain is impacting patient's mood.  Patient reports stable depression/anxiety.    Nonnarcotic treatments that are being used: SSRI/SNRI and She takes an aspirin 1 a day but cannot take more nonsteroidal anti-inflammatory drugs due to anticoagulation chronically.   Treatment goals discussed.    Opioid Risk Score: 1    Interpretation of Opioid Risk Score   Score 0-3 = Low risk of abuse. Do UDS at least once per year.  Score 4-7 = Moderate risk of abuse. Do UDS 1-4 times per year.  Score 8+ = High risk of abuse. Refer to specialist.    Last order of CONTROLLED SUBSTANCE TREATMENT AGREEMENT was found on 1/4/2018 from Office Visit on 1/4/2018     UDS Summary                URINE DRUG SCREEN Next Due 6/28/2019      Done 7/3/2018 [facility] PAIN MANAGEMENT SCREEN     Patient has more history with this topic...        Most recent UDS reviewed today and is consistent with prescribed medications.    I have reviewed the medical records, the Prescription Monitoring Program and I have determined that controlled substance treatment is medically indicated.        Followup: Return in about 3 months (around 1/1/2019), or if symptoms worsen or fail to improve.

## 2018-11-26 ENCOUNTER — PATIENT OUTREACH (OUTPATIENT)
Dept: HEALTH INFORMATION MANAGEMENT | Facility: OTHER | Age: 73
End: 2018-11-26

## 2018-11-26 NOTE — PROGRESS NOTES
Outcome: Left Message    Please transfer to Patient Outreach Team at [phone] when patient returns call.    WebIZ Checked & Epic Updated:  yes    [facility] Verified: yes    Attempt # 1

## 2018-12-28 ENCOUNTER — OFFICE VISIT (OUTPATIENT)
Dept: MEDICAL GROUP | Facility: MEDICAL CENTER | Age: 73
End: 2018-12-28
Payer: MEDICARE

## 2018-12-28 VITALS
HEART RATE: 76 BPM | SYSTOLIC BLOOD PRESSURE: 112 MMHG | HEIGHT: 64 IN | DIASTOLIC BLOOD PRESSURE: 72 MMHG | WEIGHT: 81.6 LBS | TEMPERATURE: 98.7 F | OXYGEN SATURATION: 95 % | BODY MASS INDEX: 13.93 KG/M2 | RESPIRATION RATE: 12 BRPM

## 2018-12-28 DIAGNOSIS — M05.79 RHEUMATOID ARTHRITIS INVOLVING MULTIPLE SITES WITH POSITIVE RHEUMATOID FACTOR (HCC): ICD-10-CM

## 2018-12-28 DIAGNOSIS — I10 ESSENTIAL HYPERTENSION: ICD-10-CM

## 2018-12-28 DIAGNOSIS — Z79.891 CHRONIC USE OF OPIATE FOR THERAPEUTIC PURPOSE: ICD-10-CM

## 2018-12-28 DIAGNOSIS — M54.89 VERTEBROGENIC PAIN SYNDROME: ICD-10-CM

## 2018-12-28 DIAGNOSIS — I69.90 HISTORY OF CEREBROVASCULAR ACCIDENT WITH RESIDUAL EFFECTS: ICD-10-CM

## 2018-12-28 DIAGNOSIS — G89.29 CHRONIC MIDLINE THORACIC BACK PAIN: ICD-10-CM

## 2018-12-28 DIAGNOSIS — F32.2 SEVERE MAJOR DEPRESSIVE DISORDER (HCC): ICD-10-CM

## 2018-12-28 DIAGNOSIS — M54.6 CHRONIC MIDLINE THORACIC BACK PAIN: ICD-10-CM

## 2018-12-28 PROCEDURE — 99214 OFFICE O/P EST MOD 30 MIN: CPT | Performed by: FAMILY MEDICINE

## 2018-12-28 RX ORDER — HYDROCODONE BITARTRATE AND ACETAMINOPHEN 10; 325 MG/1; MG/1
1 TABLET ORAL EVERY 4 HOURS PRN
Qty: 180 TAB | Refills: 0 | Status: SHIPPED | OUTPATIENT
Start: 2019-03-14 | End: 2019-03-28 | Stop reason: SDUPTHER

## 2018-12-28 RX ORDER — CELECOXIB 100 MG/1
100 CAPSULE ORAL 2 TIMES DAILY
Qty: 60 CAP | Refills: 6 | Status: SHIPPED | OUTPATIENT
Start: 2018-12-28 | End: 2019-03-28

## 2018-12-28 RX ORDER — DILTIAZEM HYDROCHLORIDE 180 MG/1
180 CAPSULE, COATED, EXTENDED RELEASE ORAL DAILY
Qty: 90 CAP | Refills: 3 | Status: SHIPPED | OUTPATIENT
Start: 2018-12-28 | End: 2019-01-01

## 2018-12-28 RX ORDER — HYDROCODONE BITARTRATE AND ACETAMINOPHEN 10; 325 MG/1; MG/1
1 TABLET ORAL EVERY 4 HOURS PRN
Qty: 180 TAB | Refills: 0 | Status: SHIPPED | OUTPATIENT
Start: 2019-02-13 | End: 2018-12-28 | Stop reason: SDUPTHER

## 2018-12-28 RX ORDER — HYDROCODONE BITARTRATE AND ACETAMINOPHEN 10; 325 MG/1; MG/1
1 TABLET ORAL EVERY 4 HOURS PRN
Qty: 180 TAB | Refills: 0 | Status: SHIPPED | OUTPATIENT
Start: 2019-01-14 | End: 2018-12-28 | Stop reason: SDUPTHER

## 2018-12-28 RX ORDER — ATORVASTATIN CALCIUM 40 MG/1
40 TABLET, FILM COATED ORAL
Qty: 90 TAB | Refills: 3 | Status: SHIPPED | OUTPATIENT
Start: 2018-12-28 | End: 2019-01-01

## 2018-12-28 RX ORDER — MIRTAZAPINE 15 MG/1
15 TABLET, FILM COATED ORAL
Qty: 90 TAB | Refills: 3 | Status: SHIPPED | OUTPATIENT
Start: 2018-12-28 | End: 2019-03-28

## 2018-12-28 NOTE — PROGRESS NOTES
Chief Complaint   Patient presents with   • Arthritis   • Back Pain       Subjective:     HPI:   Lakisha Bhatti presents today with the followin. Chronic midline thoracic back pain/Vertebrogenic pain syndrome  She has chronic midline back pain with rheumatoid involvement throughout the thoracic and upper lumbar spine.  This is a source of significant chronic pain.  Patient was placed on Imuran in the past by her rheumatologist which helped her rheumatoid symptoms significantly but unfortunately ended up causing severe weight loss.  It was not tolerable long-term.  She states the pain medication currently takes her pain from a 9 or 10 down to around a 7.  Many days her current regimen is not enough.  She has been using ibuprofen which unfortunately increases her risk of stroke.  She has a history of previous stroke and should not be using this medication.  She is also getting significant GI upset from the combination of ibuprofen and prednisone.  Discussed trial of Celebrex instead.    2. Rheumatoid arthritis involving multiple sites with positive rheumatoid factor (HCC)  Rheumatology has not been able to find a DMARAD that she can tolerate.  She remains on prednisone but has GI effects from it.  She is using Ibuprofen with this, even though she knows shs should not.  Discussed using celebrex instead, may work better with less GI effects and less risk of another stroke.    3. chronic use of opiate for therapeutic purpose  No aberrant or addictive use of medications has been observed.  No adverse events have been reported.  Patient counseled to not add Tylenol to current regimen.  Patient counseled to keep medications locked up or under personal control.  [facility] board of pharmacy interface is reviewed.  No inconsistencies are found.  Her average MME is 60.  This is now slightly above  CDC guideline for chronic pain prescribing by primary care.  Discussed adding celebrex and seeing if that would allow us to  reduce.    4. Essential hypertension  HTN - Chronic condition stable. Currently taking all meds as directed.   She is taking 325 mg aspirin daily.   She is occasionally monitoring BP at home.  Denies symptoms low BP: light-headed, tunnel-vision, unusual fatigue.   Denies symptoms high BP:pounding headache, visual changes, palpitations, flushed face.   Denies medicine side effects: unusual fatigue, slow heartbeat, foot/leg swelling, cough.    5. History of cerebrovascular accident  Patient denies chest pain, chest pressure, palpitations or exertional shortness of breath. Patient denies muscle aches or muscle weakness from the atorvastatin medication. Patient is a current daily smoker. Patient takes 325 mg aspirin daily. Patient has no history of myocardial infarction.  She does have history of stroke and PVD.  Lab orders discussed and placed    6. Severe major depressive disorder (HCC)  Feels the mirtazipine is somewhat helpful. She is not sure.  Her weight is down again.        Patient Active Problem List    Diagnosis Date Noted   • History of cerebrovascular accident with residual effects 01/01/2010     Priority: High   • Protein calorie malnutrition ([facility]) 07/03/2017   • Osteopenia of right thigh 05/04/2017   • Hypokalemia 04/05/2017   • Sacral fracture, closed ([facility]) 04/04/2017   • Fall 04/03/2017   • Intractable pain 04/03/2017   • Major depressive disorder with single episode, in partial remission ([facility]) 01/06/2017   • Claudication of right lower extremity ([facility]) 12/02/2016   • Iliac artery stenosis, right ([facility])    • Rheumatoid arthritis involving multiple sites with positive rheumatoid factor (CMS-HCC) 05/21/2016   • Chronic back pain 05/21/2016   • Hypertension 05/21/2016   • Unintended weight loss 05/21/2016   • Cerebrovascular accident (CVA) with involvement of right side of body ([facility]) 05/20/2016   • 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   • Abdominal aortic atherosclerosis ([facility]) 11/10/2015   •  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   • Chronic use of opiate for therapeutic purpose 11/10/2015   • Fracture of vertebra due to osteoporosis with routine healing 08/14/2015   • Tobacco dependence 01/14/2015   • Vertebrogenic pain syndrome    • MEDICAL HOME 10/10/2012   • Postmenopausal osteoporosis    • Vitamin D deficiency disease    • Dyslipidemia, goal LDL below 100    • Essential hypertension 02/04/2010   • Bilateral carotid artery stenosis    • Rheumatoid arthritis involving multiple joints (HCC)        Current medicines (including changes today)  Current Outpatient Prescriptions   Medication Sig Dispense Refill   • celecoxib (CELEBREX) 100 MG Cap Take 1 Cap by mouth 2 times a day. 60 Cap 6   • DILTIAZem CD (CARTIA XT) 180 MG CAPSULE SR 24 HR Take 1 Cap by mouth every day. 90 Cap 3   • atorvastatin (LIPITOR) 40 MG Tab Take 1 Tab by mouth every bedtime. 90 Tab 3   • mirtazapine (REMERON) 15 MG Tab Take 1 Tab by mouth every bedtime. 90 Tab 3   • [START ON 3/14/2019] HYDROcodone/acetaminophen (NORCO)  MG Tab Take 1 Tab by mouth every four hours as needed for Moderate Pain or Severe Pain (rheumatoid arthritis) for up to 30 days. 180 Tab 0   • lisinopril (PRINIVIL, ZESTRIL) 40 MG tablet Take 1 Tab by mouth every day. 90 Tab 3   • traZODone (DESYREL) 100 MG Tab Take 1 Tab by mouth every bedtime. 90 Tab 3   • clopidogrel (PLAVIX) 75 MG Tab Take 1 Tab by mouth every day. 90 Tab 3   • aspirin  MG Tablet Delayed Response Take 1 Tab by mouth every day. 100 Tab 3   • predniSONE (DELTASONE) 5 MG Tab Take 5 mg by mouth every day.     • folic acid (FOLVITE) 1 MG TABS Take 2 mg by mouth every day.       No current facility-administered medications for this visit.        Allergies   Allergen Reactions   • Gold Hives   • Hydrochlorothiazide [Hctz] Itching   • Imuran [Azathioprine Sodium] Vomiting   • Naproxen Itching   • Azathioprine      DOES NOT REMEMBER REACTIOON   • Hydrochlorothiazide    • Naproxen    •  "Relafen [Nabumetone]      Did not work   • Tape      Paper tape is ok       ROS: As per HPI       Objective:     Blood pressure 112/72, pulse 76, temperature 37.1 °C (98.7 °F), resp. rate 12, height 1.626 m (5' 4\"), weight 37 kg (81 lb 9.6 oz), SpO2 95 %, not currently breastfeeding. Body mass index is 14.01 kg/m².    Physical Exam:  Constitutional: Well-developed and well-nourished. Not diaphoretic. No distress. Lucid and fluent.  Skin: Skin is warm and dry. No rash noted.  Head: Atraumatic without lesions.  Eyes: Conjunctivae and extraocular motions are normal. Pupils are equal, round, and reactive to light. No scleral icterus.   Ears:  External ears unremarkable. Tympanic membranes clear and intact.  Nose: Nares patent. Mucosa without edema or erythema. No discharge. No facial tenderness.  Mouth/Throat: Tongue normal. Oropharynx is clear and moist. Posterior pharynx without erythema or exudates.  Neck: Supple, trachea midline. No thyromegaly present. No cervical or supraclavicular lymphadenopathy. No JVD or carotid bruits appreciated  Cardiovascular: Regular rate and rhythm.  Normal S1, S2 without murmur appreciated.  Chest: Effort normal. Clear to auscultation throughout. No adventitious sounds.   Abdomen: Soft, non tender, and without distention. Active bowel sounds in all four quadrants. No rebound, guarding, masses or hepatosplenomegaly.  Extremities: No cyanosis, clubbing, erythema, nor edema.   Neurological: Alert and oriented x 3.  No tremor.  Speech is lucid and fluent.  No tremor.  Psychiatric:  Behavior, mood, and affect are appropriate.       Assessment and Plan:     73 y.o. female with the following issues:    1. Chronic midline thoracic back pain  HYDROcodone/acetaminophen (NORCO)  MG Tab    DISCONTINUED: HYDROcodone/acetaminophen (NORCO)  MG Tab    DISCONTINUED: HYDROcodone/acetaminophen (NORCO)  MG Tab   2. Vertebrogenic pain syndrome  HYDROcodone/acetaminophen (NORCO)  MG " Tab    DISCONTINUED: HYDROcodone/acetaminophen (NORCO)  MG Tab    DISCONTINUED: HYDROcodone/acetaminophen (NORCO)  MG Tab   3. Rheumatoid arthritis involving multiple sites with positive rheumatoid factor (HCC)  celecoxib (CELEBREX) 100 MG Cap    COMP METABOLIC PANEL    CBC WITH DIFFERENTIAL    HYDROcodone/acetaminophen (NORCO)  MG Tab    DISCONTINUED: HYDROcodone/acetaminophen (NORCO)  MG Tab    DISCONTINUED: HYDROcodone/acetaminophen (NORCO)  MG Tab   4. Chronic use of opiate for therapeutic purpose  Controlled Substance Treatment Agreement   5. Essential hypertension  DILTIAZem CD (CARTIA XT) 180 MG CAPSULE SR 24 HR   6. History of cerebrovascular accident with residual effects  DILTIAZem CD (CARTIA XT) 180 MG CAPSULE SR 24 HR    atorvastatin (LIPITOR) 40 MG Tab   7. Severe major depressive disorder (HCC)  mirtazapine (REMERON) 15 MG Tab     Chronic pain recheck for: Chronic severe autoimmune arthritis pain, most prominent in her hands and hand her thoracic spine  Last dose of controlled substance: This morning, has been drove  Chronic pain treated with hydrocodone APAP taken 5 to 6 times a day    She  reports that she does not drink alcohol.  She  reports that she does not use drugs.    Interval history: Patient's pain has been more difficult to control.  She has been taking 3 ibuprofen every 4 hours during the day and has also increased her aspirin.  She is getting significant GI distress from this  Any major change in health since last appointment? No continues to be very low weight.    Consequences of Chronic Opiate therapy:  (5 A's)  Analgesia: Compared to no treatment or prior treatment, pain is currently improved  Activity: improved  Adverse Events:CAPHE@ denies constipation, itchy skin, nausea and sedation  Aberrant Behaviors: She reports she is taking medication as prescribed and is not veering from agreed treatment regimen or provider recommendations. There have been no  inappropriate refills or lost/stolen meds reported.  Affect/Mood: Pain is impacting patient's mood.  Patient reports stable depression/anxiety.  Family feels this is a little worse.  She had stopped taking the mirtazapine.  I have asked her to resume this as I believe she ate more when she was taking it and her weight was more favorable.  She and her  will double check whether she is taking it or not.    Nonnarcotic treatments that are being used: NSAIDs/SOLOMON-2, topical agents, heat and Steroids.  She is not supposed to take anti-inflammatories with her clopidogrel and her steroids.  However, she is taking high doses of ibuprofen.  Will change to Celebrex instead as there is lower bleeding risk and should be lower risk of ulceration and perhaps lower risk of recurrent stroke..     Last imaging: April 2017    Opioid Risk Score: 1    Interpretation of Opioid Risk Score   Score 0-3 = Low risk of abuse. Do UDS at least once per year.  Score 4-7 = Moderate risk of abuse. Do UDS 1-4 times per year.  Score 8+ = High risk of abuse. Refer to specialist.    Last order of CONTROLLED SUBSTANCE TREATMENT AGREEMENT was found on 12/28/2018 from Office Visit on 12/28/2018     UDS Summary                URINE DRUG SCREEN Next Due 6/28/2019      [facility]     Patient has more history with this topic...        Most recent UDS reviewed today and is consistent with prescribed medications.     I have reviewed the medical records, the Prescription Monitoring Program and I have determined that controlled substance treatment is medically indicated.       Followup: Return in about 3 months (around 3/28/2019), or if symptoms worsen or fail to improve.

## 2019-01-01 ENCOUNTER — HOME CARE VISIT (OUTPATIENT)
Dept: HOSPICE | Facility: HOSPICE | Age: 74
End: 2019-01-01
Payer: MEDICARE

## 2019-01-01 ENCOUNTER — HOSPITAL ENCOUNTER (OUTPATIENT)
Facility: MEDICAL CENTER | Age: 74
End: 2019-11-11
Attending: EMERGENCY MEDICINE | Admitting: HOSPITALIST
Payer: MEDICARE

## 2019-01-01 ENCOUNTER — TELEPHONE (OUTPATIENT)
Dept: MEDICAL GROUP | Facility: MEDICAL CENTER | Age: 74
End: 2019-01-01

## 2019-01-01 ENCOUNTER — DOCUMENTATION (OUTPATIENT)
Dept: MEDICAL GROUP | Facility: MEDICAL CENTER | Age: 74
End: 2019-01-01

## 2019-01-01 ENCOUNTER — OFFICE VISIT (OUTPATIENT)
Dept: MEDICAL GROUP | Facility: MEDICAL CENTER | Age: 74
End: 2019-01-01
Payer: MEDICARE

## 2019-01-01 ENCOUNTER — TELEPHONE (OUTPATIENT)
Dept: HOSPICE | Facility: HOSPICE | Age: 74
End: 2019-01-01
Payer: MEDICARE

## 2019-01-01 ENCOUNTER — HOSPICE ADMISSION (OUTPATIENT)
Dept: HOSPICE | Facility: HOSPICE | Age: 74
End: 2019-01-01
Payer: MEDICARE

## 2019-01-01 ENCOUNTER — APPOINTMENT (OUTPATIENT)
Dept: HOSPICE | Facility: HOSPICE | Age: 74
End: 2019-01-01
Payer: MEDICARE

## 2019-01-01 ENCOUNTER — APPOINTMENT (OUTPATIENT)
Dept: RADIOLOGY | Facility: MEDICAL CENTER | Age: 74
End: 2019-01-01
Attending: EMERGENCY MEDICINE
Payer: MEDICARE

## 2019-01-01 ENCOUNTER — HOSPITAL ENCOUNTER (OUTPATIENT)
Dept: LAB | Facility: MEDICAL CENTER | Age: 74
End: 2019-06-28
Attending: FAMILY MEDICINE
Payer: MEDICARE

## 2019-01-01 VITALS — SYSTOLIC BLOOD PRESSURE: 124 MMHG | DIASTOLIC BLOOD PRESSURE: 58 MMHG | RESPIRATION RATE: 16 BRPM | HEART RATE: 76 BPM

## 2019-01-01 VITALS
WEIGHT: 85.6 LBS | HEIGHT: 64 IN | RESPIRATION RATE: 12 BRPM | TEMPERATURE: 97 F | DIASTOLIC BLOOD PRESSURE: 82 MMHG | HEART RATE: 72 BPM | SYSTOLIC BLOOD PRESSURE: 108 MMHG | BODY MASS INDEX: 14.61 KG/M2 | OXYGEN SATURATION: 95 %

## 2019-01-01 VITALS — DIASTOLIC BLOOD PRESSURE: 62 MMHG | HEART RATE: 76 BPM | SYSTOLIC BLOOD PRESSURE: 120 MMHG | RESPIRATION RATE: 16 BRPM

## 2019-01-01 VITALS — RESPIRATION RATE: 18 BRPM | DIASTOLIC BLOOD PRESSURE: 70 MMHG | SYSTOLIC BLOOD PRESSURE: 114 MMHG | HEART RATE: 76 BPM

## 2019-01-01 VITALS
HEIGHT: 63 IN | BODY MASS INDEX: 14.18 KG/M2 | HEART RATE: 78 BPM | DIASTOLIC BLOOD PRESSURE: 88 MMHG | SYSTOLIC BLOOD PRESSURE: 159 MMHG | TEMPERATURE: 99.2 F | OXYGEN SATURATION: 98 % | WEIGHT: 80.03 LBS | RESPIRATION RATE: 16 BRPM

## 2019-01-01 VITALS
SYSTOLIC BLOOD PRESSURE: 120 MMHG | HEART RATE: 74 BPM | TEMPERATURE: 98.6 F | RESPIRATION RATE: 14 BRPM | OXYGEN SATURATION: 94 % | BODY MASS INDEX: 14.68 KG/M2 | HEIGHT: 64 IN | WEIGHT: 86 LBS | DIASTOLIC BLOOD PRESSURE: 76 MMHG

## 2019-01-01 VITALS — RESPIRATION RATE: 16 BRPM | HEART RATE: 70 BPM | DIASTOLIC BLOOD PRESSURE: 80 MMHG | SYSTOLIC BLOOD PRESSURE: 138 MMHG

## 2019-01-01 VITALS — RESPIRATION RATE: 16 BRPM | DIASTOLIC BLOOD PRESSURE: 64 MMHG | SYSTOLIC BLOOD PRESSURE: 126 MMHG

## 2019-01-01 VITALS — DIASTOLIC BLOOD PRESSURE: 85 MMHG | HEART RATE: 82 BPM | RESPIRATION RATE: 22 BRPM | SYSTOLIC BLOOD PRESSURE: 128 MMHG

## 2019-01-01 VITALS — SYSTOLIC BLOOD PRESSURE: 152 MMHG | DIASTOLIC BLOOD PRESSURE: 78 MMHG | HEART RATE: 56 BPM | RESPIRATION RATE: 14 BRPM

## 2019-01-01 VITALS — HEART RATE: 72 BPM | SYSTOLIC BLOOD PRESSURE: 132 MMHG | DIASTOLIC BLOOD PRESSURE: 68 MMHG | RESPIRATION RATE: 18 BRPM

## 2019-01-01 DIAGNOSIS — M06.9 RHEUMATOID ARTHRITIS INVOLVING MULTIPLE JOINTS (HCC): ICD-10-CM

## 2019-01-01 DIAGNOSIS — I71.9 PSEUDOANEURYSM OF AORTA (HCC): ICD-10-CM

## 2019-01-01 DIAGNOSIS — E78.5 DYSLIPIDEMIA, GOAL LDL BELOW 100: ICD-10-CM

## 2019-01-01 DIAGNOSIS — I70.202 FEMORAL ARTERY STENOSIS, LEFT (HCC): ICD-10-CM

## 2019-01-01 DIAGNOSIS — M54.6 CHRONIC MIDLINE THORACIC BACK PAIN: ICD-10-CM

## 2019-01-01 DIAGNOSIS — F17.200 TOBACCO DEPENDENCE: ICD-10-CM

## 2019-01-01 DIAGNOSIS — E55.9 VITAMIN D DEFICIENCY DISEASE: ICD-10-CM

## 2019-01-01 DIAGNOSIS — M54.89 VERTEBROGENIC PAIN SYNDROME: ICD-10-CM

## 2019-01-01 DIAGNOSIS — D64.9 ANEMIA, UNSPECIFIED TYPE: ICD-10-CM

## 2019-01-01 DIAGNOSIS — D50.9 IRON DEFICIENCY ANEMIA, UNSPECIFIED IRON DEFICIENCY ANEMIA TYPE: ICD-10-CM

## 2019-01-01 DIAGNOSIS — M06.9 RHEUMATOID ARTHRITIS, INVOLVING UNSPECIFIED SITE, UNSPECIFIED RHEUMATOID FACTOR PRESENCE: ICD-10-CM

## 2019-01-01 DIAGNOSIS — I69.90 HISTORY OF CEREBROVASCULAR ACCIDENT WITH RESIDUAL EFFECTS: ICD-10-CM

## 2019-01-01 DIAGNOSIS — G89.4 CHRONIC PAIN SYNDROME: ICD-10-CM

## 2019-01-01 DIAGNOSIS — I77.1 ILIAC ARTERY STENOSIS, RIGHT (HCC): ICD-10-CM

## 2019-01-01 DIAGNOSIS — M85.851 OSTEOPENIA OF RIGHT THIGH: ICD-10-CM

## 2019-01-01 DIAGNOSIS — I10 ESSENTIAL HYPERTENSION: ICD-10-CM

## 2019-01-01 DIAGNOSIS — M80.08XD FRACTURE OF VERTEBRA DUE TO OSTEOPOROSIS WITH ROUTINE HEALING, SUBSEQUENT ENCOUNTER: ICD-10-CM

## 2019-01-01 DIAGNOSIS — G89.29 CHRONIC MIDLINE THORACIC BACK PAIN: ICD-10-CM

## 2019-01-01 DIAGNOSIS — E61.1 IRON DEFICIENCY: ICD-10-CM

## 2019-01-01 DIAGNOSIS — I65.23 BILATERAL CAROTID ARTERY STENOSIS: ICD-10-CM

## 2019-01-01 DIAGNOSIS — M06.9 RHEUMATOID ARTHRITIS INVOLVING MULTIPLE SITES, UNSPECIFIED RHEUMATOID FACTOR PRESENCE: ICD-10-CM

## 2019-01-01 DIAGNOSIS — M05.79 RHEUMATOID ARTHRITIS INVOLVING MULTIPLE SITES WITH POSITIVE RHEUMATOID FACTOR (HCC): ICD-10-CM

## 2019-01-01 DIAGNOSIS — R52 INADEQUATE PAIN CONTROL: ICD-10-CM

## 2019-01-01 DIAGNOSIS — M80.08XD FRACTURE OF VERTEBRA DUE TO OSTEOPOROSIS WITH ROUTINE HEALING: ICD-10-CM

## 2019-01-01 DIAGNOSIS — I70.0 ABDOMINAL AORTIC ATHEROSCLEROSIS (HCC): ICD-10-CM

## 2019-01-01 DIAGNOSIS — E53.8 B12 NUTRITIONAL DEFICIENCY: ICD-10-CM

## 2019-01-01 DIAGNOSIS — M81.0 POSTMENOPAUSAL OSTEOPOROSIS: ICD-10-CM

## 2019-01-01 DIAGNOSIS — Z79.891 CHRONIC USE OF OPIATE FOR THERAPEUTIC PURPOSE: ICD-10-CM

## 2019-01-01 DIAGNOSIS — M06.9 RHEUMATOID ARTHRITIS OF HAND, UNSPECIFIED LATERALITY, UNSPECIFIED RHEUMATOID FACTOR PRESENCE: ICD-10-CM

## 2019-01-01 DIAGNOSIS — I50.42 CHRONIC COMBINED SYSTOLIC AND DIASTOLIC CONGESTIVE HEART FAILURE (HCC): ICD-10-CM

## 2019-01-01 DIAGNOSIS — F32.4 MAJOR DEPRESSIVE DISORDER WITH SINGLE EPISODE, IN PARTIAL REMISSION (HCC): ICD-10-CM

## 2019-01-01 DIAGNOSIS — Z00.00 MEDICARE ANNUAL WELLNESS VISIT, SUBSEQUENT: ICD-10-CM

## 2019-01-01 DIAGNOSIS — I74.2 BRACHIAL ARTERY THROMBUS (HCC): ICD-10-CM

## 2019-01-01 DIAGNOSIS — S32.040A COMPRESSION FRACTURE OF L4 VERTEBRA, INITIAL ENCOUNTER (HCC): ICD-10-CM

## 2019-01-01 DIAGNOSIS — I73.9 CLAUDICATION OF RIGHT LOWER EXTREMITY (HCC): ICD-10-CM

## 2019-01-01 LAB
ALBUMIN SERPL BCP-MCNC: 4.1 G/DL (ref 3.2–4.9)
ALBUMIN/GLOB SERPL: 1.6 G/DL
ALP SERPL-CCNC: 71 U/L (ref 30–99)
ALT SERPL-CCNC: 6 U/L (ref 2–50)
ANION GAP SERPL CALC-SCNC: 6 MMOL/L (ref 0–11.9)
ANION GAP SERPL CALC-SCNC: 7 MMOL/L (ref 0–11.9)
ANISOCYTOSIS BLD QL SMEAR: ABNORMAL
ANISOCYTOSIS BLD QL SMEAR: ABNORMAL
APPEARANCE UR: CLEAR
AST SERPL-CCNC: 16 U/L (ref 12–45)
BASOPHILS # BLD AUTO: 0.7 % (ref 0–1.8)
BASOPHILS # BLD AUTO: 0.8 % (ref 0–1.8)
BASOPHILS # BLD AUTO: 1.2 % (ref 0–1.8)
BASOPHILS # BLD: 0.06 K/UL (ref 0–0.12)
BASOPHILS # BLD: 0.06 K/UL (ref 0–0.12)
BASOPHILS # BLD: 0.1 K/UL (ref 0–0.12)
BILIRUB SERPL-MCNC: 0.9 MG/DL (ref 0.1–1.5)
BILIRUB UR QL STRIP.AUTO: NEGATIVE
BUN SERPL-MCNC: 14 MG/DL (ref 8–22)
BUN SERPL-MCNC: 16 MG/DL (ref 8–22)
CALCIUM SERPL-MCNC: 8.9 MG/DL (ref 8.5–10.5)
CALCIUM SERPL-MCNC: 9.1 MG/DL (ref 8.5–10.5)
CHLORIDE SERPL-SCNC: 104 MMOL/L (ref 96–112)
CHLORIDE SERPL-SCNC: 107 MMOL/L (ref 96–112)
CO2 SERPL-SCNC: 25 MMOL/L (ref 20–33)
CO2 SERPL-SCNC: 26 MMOL/L (ref 20–33)
COLOR UR: YELLOW
COMMENT 1642: NORMAL
CREAT SERPL-MCNC: 0.54 MG/DL (ref 0.5–1.4)
CREAT SERPL-MCNC: 0.6 MG/DL (ref 0.5–1.4)
EOSINOPHIL # BLD AUTO: 0.03 K/UL (ref 0–0.51)
EOSINOPHIL # BLD AUTO: 0.04 K/UL (ref 0–0.51)
EOSINOPHIL # BLD AUTO: 0.08 K/UL (ref 0–0.51)
EOSINOPHIL NFR BLD: 0.3 % (ref 0–6.9)
EOSINOPHIL NFR BLD: 0.5 % (ref 0–6.9)
EOSINOPHIL NFR BLD: 0.9 % (ref 0–6.9)
ERYTHROCYTE [DISTWIDTH] IN BLOOD BY AUTOMATED COUNT: 51.9 FL (ref 35.9–50)
ERYTHROCYTE [DISTWIDTH] IN BLOOD BY AUTOMATED COUNT: 52.4 FL (ref 35.9–50)
ERYTHROCYTE [DISTWIDTH] IN BLOOD BY AUTOMATED COUNT: 54 FL (ref 35.9–50)
FERRITIN SERPL-MCNC: 6.4 NG/ML (ref 10–291)
GLOBULIN SER CALC-MCNC: 2.5 G/DL (ref 1.9–3.5)
GLUCOSE SERPL-MCNC: 83 MG/DL (ref 65–99)
GLUCOSE SERPL-MCNC: 86 MG/DL (ref 65–99)
GLUCOSE UR STRIP.AUTO-MCNC: NEGATIVE MG/DL
HCT VFR BLD AUTO: 36 % (ref 37–47)
HCT VFR BLD AUTO: 36.4 % (ref 37–47)
HCT VFR BLD AUTO: 36.7 % (ref 37–47)
HGB BLD-MCNC: 9.2 G/DL (ref 12–16)
HGB BLD-MCNC: 9.5 G/DL (ref 12–16)
HGB BLD-MCNC: 9.6 G/DL (ref 12–16)
HYPOCHROMIA BLD QL SMEAR: ABNORMAL
HYPOCHROMIA BLD QL SMEAR: ABNORMAL
IMM GRANULOCYTES # BLD AUTO: 0.04 K/UL (ref 0–0.11)
IMM GRANULOCYTES # BLD AUTO: 0.04 K/UL (ref 0–0.11)
IMM GRANULOCYTES # BLD AUTO: 0.05 K/UL (ref 0–0.11)
IMM GRANULOCYTES NFR BLD AUTO: 0.5 % (ref 0–0.9)
IMM GRANULOCYTES NFR BLD AUTO: 0.5 % (ref 0–0.9)
IMM GRANULOCYTES NFR BLD AUTO: 0.6 % (ref 0–0.9)
IRON SATN MFR SERPL: 3 % (ref 15–55)
IRON SERPL-MCNC: 15 UG/DL (ref 40–170)
KETONES UR STRIP.AUTO-MCNC: NEGATIVE MG/DL
LEUKOCYTE ESTERASE UR QL STRIP.AUTO: NEGATIVE
LG PLATELETS BLD QL SMEAR: NORMAL
LYMPHOCYTES # BLD AUTO: 0.57 K/UL (ref 1–4.8)
LYMPHOCYTES # BLD AUTO: 0.86 K/UL (ref 1–4.8)
LYMPHOCYTES # BLD AUTO: 0.99 K/UL (ref 1–4.8)
LYMPHOCYTES NFR BLD: 10.2 % (ref 22–41)
LYMPHOCYTES NFR BLD: 11.4 % (ref 22–41)
LYMPHOCYTES NFR BLD: 7.7 % (ref 22–41)
MCH RBC QN AUTO: 19.2 PG (ref 27–33)
MCH RBC QN AUTO: 19.6 PG (ref 27–33)
MCH RBC QN AUTO: 19.8 PG (ref 27–33)
MCHC RBC AUTO-ENTMCNC: 25.1 G/DL (ref 33.6–35)
MCHC RBC AUTO-ENTMCNC: 26.1 G/DL (ref 33.6–35)
MCHC RBC AUTO-ENTMCNC: 26.7 G/DL (ref 33.6–35)
MCV RBC AUTO: 74.2 FL (ref 81.4–97.8)
MCV RBC AUTO: 75.1 FL (ref 81.4–97.8)
MCV RBC AUTO: 76.5 FL (ref 81.4–97.8)
MICRO URNS: NORMAL
MICROCYTES BLD QL SMEAR: ABNORMAL
MICROCYTES BLD QL SMEAR: ABNORMAL
MONOCYTES # BLD AUTO: 0.23 K/UL (ref 0–0.85)
MONOCYTES # BLD AUTO: 0.75 K/UL (ref 0–0.85)
MONOCYTES # BLD AUTO: 1 K/UL (ref 0–0.85)
MONOCYTES NFR BLD AUTO: 11.5 % (ref 0–13.4)
MONOCYTES NFR BLD AUTO: 3.1 % (ref 0–13.4)
MONOCYTES NFR BLD AUTO: 8.9 % (ref 0–13.4)
MORPHOLOGY BLD-IMP: NORMAL
NEUTROPHILS # BLD AUTO: 6.45 K/UL (ref 2–7.15)
NEUTROPHILS # BLD AUTO: 6.57 K/UL (ref 2–7.15)
NEUTROPHILS # BLD AUTO: 6.62 K/UL (ref 2–7.15)
NEUTROPHILS NFR BLD: 75.6 % (ref 44–72)
NEUTROPHILS NFR BLD: 78.2 % (ref 44–72)
NEUTROPHILS NFR BLD: 87.4 % (ref 44–72)
NITRITE UR QL STRIP.AUTO: NEGATIVE
NRBC # BLD AUTO: 0 K/UL
NRBC BLD-RTO: 0 /100 WBC
OVALOCYTES BLD QL SMEAR: NORMAL
OVALOCYTES BLD QL SMEAR: NORMAL
PH UR STRIP.AUTO: 7.5 [PH] (ref 5–8)
PLATELET # BLD AUTO: 178 K/UL (ref 164–446)
PLATELET # BLD AUTO: 237 K/UL (ref 164–446)
PLATELET # BLD AUTO: 288 K/UL (ref 164–446)
PLATELET BLD QL SMEAR: NORMAL
PLATELET BLD QL SMEAR: NORMAL
PMV BLD AUTO: 9.4 FL (ref 9–12.9)
PMV BLD AUTO: 9.8 FL (ref 9–12.9)
POIKILOCYTOSIS BLD QL SMEAR: NORMAL
POIKILOCYTOSIS BLD QL SMEAR: NORMAL
POTASSIUM SERPL-SCNC: 3.2 MMOL/L (ref 3.6–5.5)
POTASSIUM SERPL-SCNC: 3.9 MMOL/L (ref 3.6–5.5)
PROT SERPL-MCNC: 6.6 G/DL (ref 6–8.2)
PROT UR QL STRIP: NEGATIVE MG/DL
RBC # BLD AUTO: 4.8 M/UL (ref 4.2–5.4)
RBC # BLD AUTO: 4.85 M/UL (ref 4.2–5.4)
RBC # BLD AUTO: 4.85 M/UL (ref 4.2–5.4)
RBC BLD AUTO: PRESENT
RBC BLD AUTO: PRESENT
RBC UR QL AUTO: NEGATIVE
SCHISTOCYTES BLD QL SMEAR: NORMAL
SCHISTOCYTES BLD QL SMEAR: NORMAL
SODIUM SERPL-SCNC: 137 MMOL/L (ref 135–145)
SODIUM SERPL-SCNC: 138 MMOL/L (ref 135–145)
SP GR UR STRIP.AUTO: 1.01
TARGETS BLD QL SMEAR: NORMAL
TARGETS BLD QL SMEAR: NORMAL
TIBC SERPL-MCNC: 507 UG/DL (ref 250–450)
UROBILINOGEN UR STRIP.AUTO-MCNC: 0.2 MG/DL
VIT B12 SERPL-MCNC: 409 PG/ML (ref 211–911)
WBC # BLD AUTO: 7.4 K/UL (ref 4.8–10.8)
WBC # BLD AUTO: 8.5 K/UL (ref 4.8–10.8)
WBC # BLD AUTO: 8.7 K/UL (ref 4.8–10.8)

## 2019-01-01 PROCEDURE — 99498 ADVNCD CARE PLAN ADDL 30 MIN: CPT | Performed by: NURSE PRACTITIONER

## 2019-01-01 PROCEDURE — 36415 COLL VENOUS BLD VENIPUNCTURE: CPT

## 2019-01-01 PROCEDURE — A9270 NON-COVERED ITEM OR SERVICE: HCPCS | Performed by: NURSE PRACTITIONER

## 2019-01-01 PROCEDURE — S9126 HOSPICE CARE, IN THE HOME, P: HCPCS

## 2019-01-01 PROCEDURE — 96376 TX/PRO/DX INJ SAME DRUG ADON: CPT

## 2019-01-01 PROCEDURE — G0439 PPPS, SUBSEQ VISIT: HCPCS | Performed by: FAMILY MEDICINE

## 2019-01-01 PROCEDURE — 700101 HCHG RX REV CODE 250: Performed by: INTERNAL MEDICINE

## 2019-01-01 PROCEDURE — 700111 HCHG RX REV CODE 636 W/ 250 OVERRIDE (IP): Performed by: HOSPITALIST

## 2019-01-01 PROCEDURE — 700102 HCHG RX REV CODE 250 W/ 637 OVERRIDE(OP): Performed by: HOSPITALIST

## 2019-01-01 PROCEDURE — 96372 THER/PROPH/DIAG INJ SC/IM: CPT

## 2019-01-01 PROCEDURE — 99205 OFFICE O/P NEW HI 60 MIN: CPT | Performed by: FAMILY MEDICINE

## 2019-01-01 PROCEDURE — 74177 CT ABD & PELVIS W/CONTRAST: CPT

## 2019-01-01 PROCEDURE — 82607 VITAMIN B-12: CPT

## 2019-01-01 PROCEDURE — 80053 COMPREHEN METABOLIC PANEL: CPT

## 2019-01-01 PROCEDURE — 72131 CT LUMBAR SPINE W/O DYE: CPT

## 2019-01-01 PROCEDURE — A9270 NON-COVERED ITEM OR SERVICE: HCPCS | Performed by: HOSPITALIST

## 2019-01-01 PROCEDURE — 6650990 HC HOSPICE AND HOME CARE RX REV CODE 0250

## 2019-01-01 PROCEDURE — 700102 HCHG RX REV CODE 250 W/ 637 OVERRIDE(OP): Performed by: STUDENT IN AN ORGANIZED HEALTH CARE EDUCATION/TRAINING PROGRAM

## 2019-01-01 PROCEDURE — G0299 HHS/HOSPICE OF RN EA 15 MIN: HCPCS

## 2019-01-01 PROCEDURE — 96374 THER/PROPH/DIAG INJ IV PUSH: CPT | Mod: XU

## 2019-01-01 PROCEDURE — G0378 HOSPITAL OBSERVATION PER HR: HCPCS

## 2019-01-01 PROCEDURE — A9270 NON-COVERED ITEM OR SERVICE: HCPCS | Performed by: STUDENT IN AN ORGANIZED HEALTH CARE EDUCATION/TRAINING PROGRAM

## 2019-01-01 PROCEDURE — 82728 ASSAY OF FERRITIN: CPT

## 2019-01-01 PROCEDURE — 700102 HCHG RX REV CODE 250 W/ 637 OVERRIDE(OP): Performed by: NURSE PRACTITIONER

## 2019-01-01 PROCEDURE — L0458 TLSO 2MOD SYMPHIS-XIPHO PRE: HCPCS

## 2019-01-01 PROCEDURE — 665036 HSPC NOTICE OF ELECTION NOE

## 2019-01-01 PROCEDURE — 99225 PR SUBSEQUENT OBSERVATION CARE,LEVEL II: CPT | Performed by: HOSPITALIST

## 2019-01-01 PROCEDURE — 99406 BEHAV CHNG SMOKING 3-10 MIN: CPT

## 2019-01-01 PROCEDURE — 99226 PR SUBSEQUENT OBSERVATION CARE,LEVEL III: CPT | Performed by: HOSPITALIST

## 2019-01-01 PROCEDURE — 99220 PR INITIAL OBSERVATION CARE,LEVL III: CPT | Performed by: HOSPITALIST

## 2019-01-01 PROCEDURE — 99285 EMERGENCY DEPT VISIT HI MDM: CPT

## 2019-01-01 PROCEDURE — 99497 ADVNCD CARE PLAN 30 MIN: CPT | Mod: GW | Performed by: NURSE PRACTITIONER

## 2019-01-01 PROCEDURE — 99497 ADVNCD CARE PLAN 30 MIN: CPT | Mod: 25 | Performed by: HOSPITALIST

## 2019-01-01 PROCEDURE — 83550 IRON BINDING TEST: CPT

## 2019-01-01 PROCEDURE — L0150 CERV SEMI-RIG ADJ MOLDED CHN: HCPCS

## 2019-01-01 PROCEDURE — 99203 OFFICE O/P NEW LOW 30 MIN: CPT | Mod: 25 | Performed by: NURSE PRACTITIONER

## 2019-01-01 PROCEDURE — 81003 URINALYSIS AUTO W/O SCOPE: CPT

## 2019-01-01 PROCEDURE — 700117 HCHG RX CONTRAST REV CODE 255: Performed by: EMERGENCY MEDICINE

## 2019-01-01 PROCEDURE — 99497 ADVNCD CARE PLAN 30 MIN: CPT | Performed by: NURSE PRACTITIONER

## 2019-01-01 PROCEDURE — 80048 BASIC METABOLIC PNL TOTAL CA: CPT

## 2019-01-01 PROCEDURE — 85025 COMPLETE CBC W/AUTO DIFF WBC: CPT

## 2019-01-01 PROCEDURE — 99226 PR SUBSEQUENT OBSERVATION CARE,LEVEL III: CPT | Performed by: INTERNAL MEDICINE

## 2019-01-01 PROCEDURE — 700101 HCHG RX REV CODE 250: Performed by: HOSPITALIST

## 2019-01-01 PROCEDURE — 700111 HCHG RX REV CODE 636 W/ 250 OVERRIDE (IP): Performed by: EMERGENCY MEDICINE

## 2019-01-01 PROCEDURE — 99217 PR OBSERVATION CARE DISCHARGE: CPT | Performed by: HOSPITALIST

## 2019-01-01 PROCEDURE — 99232 SBSQ HOSP IP/OBS MODERATE 35: CPT | Performed by: NURSE PRACTITIONER

## 2019-01-01 PROCEDURE — 99213 OFFICE O/P EST LOW 20 MIN: CPT | Performed by: FAMILY MEDICINE

## 2019-01-01 PROCEDURE — 99356 PR PROLONGED SVC I/P OR OBS SETTING 1ST HOUR: CPT | Performed by: INTERNAL MEDICINE

## 2019-01-01 PROCEDURE — G0155 HHCP-SVS OF CSW,EA 15 MIN: HCPCS

## 2019-01-01 PROCEDURE — 96375 TX/PRO/DX INJ NEW DRUG ADDON: CPT

## 2019-01-01 PROCEDURE — 83540 ASSAY OF IRON: CPT

## 2019-01-01 RX ORDER — MORPHINE SULFATE 30 MG/1
30 TABLET, FILM COATED, EXTENDED RELEASE ORAL EVERY 12 HOURS
Qty: 40 TAB | Refills: 0 | OUTPATIENT
Start: 2019-01-01 | End: 2019-01-01

## 2019-01-01 RX ORDER — HYDROCODONE BITARTRATE AND ACETAMINOPHEN 10; 325 MG/1; MG/1
1 TABLET ORAL EVERY 4 HOURS PRN
Qty: 150 TAB | Refills: 0 | Status: SHIPPED | OUTPATIENT
Start: 2019-01-01 | End: 2019-01-01

## 2019-01-01 RX ORDER — MORPHINE SULFATE 30 MG/1
30 TABLET, FILM COATED, EXTENDED RELEASE ORAL EVERY EVENING
Qty: 40 TAB | Refills: 0 | Status: SHIPPED | OUTPATIENT
Start: 2019-01-01 | End: 2020-01-01

## 2019-01-01 RX ORDER — MORPHINE SULFATE 30 MG/1
30 TABLET, FILM COATED, EXTENDED RELEASE ORAL EVERY EVENING
Qty: 40 TAB | Refills: 0 | Status: CANCELLED | OUTPATIENT
Start: 2019-01-01 | End: 2019-01-01

## 2019-01-01 RX ORDER — HYDROCODONE BITARTRATE AND ACETAMINOPHEN 10; 325 MG/1; MG/1
1 TABLET ORAL EVERY 4 HOURS PRN
Qty: 60 TAB | Refills: 0 | Status: CANCELLED | OUTPATIENT
Start: 2019-01-01 | End: 2019-01-01

## 2019-01-01 RX ORDER — MORPHINE SULFATE 15 MG/1
15 TABLET, FILM COATED, EXTENDED RELEASE ORAL EVERY 12 HOURS
Status: DISCONTINUED | OUTPATIENT
Start: 2019-01-01 | End: 2019-01-01

## 2019-01-01 RX ORDER — MORPHINE SULFATE 30 MG/1
30 TABLET, FILM COATED, EXTENDED RELEASE ORAL EVERY EVENING
Qty: 40 TAB | Refills: 0 | Status: CANCELLED | OUTPATIENT
Start: 2019-01-01 | End: 2020-01-01

## 2019-01-01 RX ORDER — LIDOCAINE 50 MG/G
1 PATCH TOPICAL EVERY 24 HOURS
Status: DISCONTINUED | OUTPATIENT
Start: 2019-01-01 | End: 2019-01-01 | Stop reason: HOSPADM

## 2019-01-01 RX ORDER — PREDNISONE 5 MG/1
5 TABLET ORAL DAILY
Status: DISCONTINUED | OUTPATIENT
Start: 2019-01-01 | End: 2019-01-01 | Stop reason: HOSPADM

## 2019-01-01 RX ORDER — DULOXETIN HYDROCHLORIDE 20 MG/1
20 CAPSULE, DELAYED RELEASE ORAL DAILY
Qty: 30 CAP | Refills: 0 | Status: CANCELLED | OUTPATIENT
Start: 2019-01-01

## 2019-01-01 RX ORDER — DILTIAZEM HYDROCHLORIDE 180 MG/1
180 CAPSULE, COATED, EXTENDED RELEASE ORAL DAILY
Status: DISCONTINUED | OUTPATIENT
Start: 2019-01-01 | End: 2019-01-01 | Stop reason: HOSPADM

## 2019-01-01 RX ORDER — MORPHINE SULFATE 30 MG/1
30 TABLET, FILM COATED, EXTENDED RELEASE ORAL EVERY 12 HOURS
Qty: 40 TAB | Refills: 0 | Status: SHIPPED | OUTPATIENT
Start: 2019-01-01 | End: 2019-01-01 | Stop reason: SDUPTHER

## 2019-01-01 RX ORDER — HYDROCODONE BITARTRATE AND ACETAMINOPHEN 10; 325 MG/1; MG/1
1 TABLET ORAL EVERY 4 HOURS PRN
Qty: 60 TAB | Refills: 0 | Status: SHIPPED | OUTPATIENT
Start: 2019-01-01 | End: 2019-01-01

## 2019-01-01 RX ORDER — TRAZODONE HYDROCHLORIDE 100 MG/1
50 TABLET ORAL NIGHTLY PRN
Status: DISCONTINUED | OUTPATIENT
Start: 2019-01-01 | End: 2019-01-01

## 2019-01-01 RX ORDER — MORPHINE SULFATE 15 MG/1
15 TABLET, FILM COATED, EXTENDED RELEASE ORAL ONCE
Status: COMPLETED | OUTPATIENT
Start: 2019-01-01 | End: 2019-01-01

## 2019-01-01 RX ORDER — DULOXETIN HYDROCHLORIDE 20 MG/1
20 CAPSULE, DELAYED RELEASE ORAL DAILY
Qty: 30 CAP | Refills: 0 | OUTPATIENT
Start: 2019-01-01

## 2019-01-01 RX ORDER — HYDROCODONE BITARTRATE AND ACETAMINOPHEN 10; 325 MG/1; MG/1
2 TABLET ORAL EVERY 4 HOURS PRN
Status: DISCONTINUED | OUTPATIENT
Start: 2019-01-01 | End: 2019-01-01

## 2019-01-01 RX ORDER — HYDROCODONE BITARTRATE AND ACETAMINOPHEN 10; 325 MG/1; MG/1
1 TABLET ORAL EVERY 4 HOURS PRN
Qty: 150 TAB | Refills: 0 | Status: SHIPPED | OUTPATIENT
Start: 2019-01-01 | End: 2019-01-01 | Stop reason: SDUPTHER

## 2019-01-01 RX ORDER — MORPHINE SULFATE 4 MG/ML
4 INJECTION, SOLUTION INTRAMUSCULAR; INTRAVENOUS ONCE
Status: COMPLETED | OUTPATIENT
Start: 2019-01-01 | End: 2019-01-01

## 2019-01-01 RX ORDER — SODIUM CHLORIDE AND POTASSIUM CHLORIDE 150; 900 MG/100ML; MG/100ML
INJECTION, SOLUTION INTRAVENOUS CONTINUOUS
Status: ACTIVE | OUTPATIENT
Start: 2019-01-01 | End: 2019-01-01

## 2019-01-01 RX ORDER — LACTULOSE 20 G/30ML
15 SOLUTION ORAL 3 TIMES DAILY
Status: DISCONTINUED | OUTPATIENT
Start: 2019-01-01 | End: 2019-01-01

## 2019-01-01 RX ORDER — POLYETHYLENE GLYCOL 3350 17 G/17G
1 POWDER, FOR SOLUTION ORAL
Status: DISCONTINUED | OUTPATIENT
Start: 2019-01-01 | End: 2019-01-01 | Stop reason: HOSPADM

## 2019-01-01 RX ORDER — MORPHINE SULFATE 4 MG/ML
1 INJECTION, SOLUTION INTRAMUSCULAR; INTRAVENOUS EVERY 4 HOURS PRN
Status: DISCONTINUED | OUTPATIENT
Start: 2019-01-01 | End: 2019-01-01

## 2019-01-01 RX ORDER — DULOXETIN HYDROCHLORIDE 20 MG/1
20 CAPSULE, DELAYED RELEASE ORAL DAILY
Qty: 30 CAP | Refills: 0 | Status: SHIPPED | OUTPATIENT
Start: 2019-01-01 | End: 2019-01-01 | Stop reason: SDUPTHER

## 2019-01-01 RX ORDER — HYDROCODONE BITARTRATE AND ACETAMINOPHEN 10; 325 MG/1; MG/1
1 TABLET ORAL EVERY 4 HOURS PRN
Qty: 60 TAB | Refills: 0 | Status: SHIPPED | OUTPATIENT
Start: 2019-01-01 | End: 2019-01-01 | Stop reason: SDUPTHER

## 2019-01-01 RX ORDER — ONDANSETRON 2 MG/ML
4 INJECTION INTRAMUSCULAR; INTRAVENOUS EVERY 4 HOURS PRN
Status: DISCONTINUED | OUTPATIENT
Start: 2019-01-01 | End: 2019-01-01 | Stop reason: HOSPADM

## 2019-01-01 RX ORDER — FERROUS SULFATE 325(65) MG
TABLET ORAL
Qty: 100 TAB | Refills: 2 | Status: SHIPPED | OUTPATIENT
Start: 2019-01-01 | End: 2019-01-01

## 2019-01-01 RX ORDER — MORPHINE SULFATE 30 MG/1
30 TABLET, FILM COATED, EXTENDED RELEASE ORAL EVERY 12 HOURS
Qty: 40 TAB | Refills: 0 | Status: CANCELLED | OUTPATIENT
Start: 2019-01-01 | End: 2019-01-01

## 2019-01-01 RX ORDER — LISINOPRIL 40 MG/1
40 TABLET ORAL DAILY
Qty: 100 TAB | Refills: 3 | Status: CANCELLED | OUTPATIENT
Start: 2019-01-01

## 2019-01-01 RX ORDER — DULOXETIN HYDROCHLORIDE 20 MG/1
20 CAPSULE, DELAYED RELEASE ORAL DAILY
Qty: 30 CAP | Refills: 0 | Status: SHIPPED | OUTPATIENT
Start: 2019-01-01 | End: 2020-01-01 | Stop reason: SDUPTHER

## 2019-01-01 RX ORDER — HYDROCODONE BITARTRATE AND ACETAMINOPHEN 7.5; 325 MG/1; MG/1
1 TABLET ORAL EVERY 4 HOURS PRN
Qty: 180 TAB | Refills: 0 | Status: SHIPPED | OUTPATIENT
Start: 2019-01-01 | End: 2019-01-01

## 2019-01-01 RX ORDER — CLOPIDOGREL BISULFATE 75 MG/1
75 TABLET ORAL DAILY
Qty: 90 TAB | Refills: 3 | Status: SHIPPED | OUTPATIENT
Start: 2019-01-01 | End: 2019-01-01

## 2019-01-01 RX ORDER — POTASSIUM CHLORIDE 20 MEQ/1
20 TABLET, EXTENDED RELEASE ORAL DAILY
Status: DISCONTINUED | OUTPATIENT
Start: 2019-01-01 | End: 2019-01-01 | Stop reason: HOSPADM

## 2019-01-01 RX ORDER — NICOTINE 21 MG/24HR
21 PATCH, TRANSDERMAL 24 HOURS TRANSDERMAL
Status: DISCONTINUED | OUTPATIENT
Start: 2019-01-01 | End: 2019-01-01 | Stop reason: HOSPADM

## 2019-01-01 RX ORDER — HYDROCODONE BITARTRATE AND ACETAMINOPHEN 10; 325 MG/1; MG/1
1 TABLET ORAL EVERY 4 HOURS PRN
Qty: 180 TAB | Refills: 0 | Status: SHIPPED | OUTPATIENT
Start: 2019-01-01 | End: 2019-01-01

## 2019-01-01 RX ORDER — MORPHINE SULFATE 4 MG/ML
4 INJECTION, SOLUTION INTRAMUSCULAR; INTRAVENOUS EVERY 4 HOURS PRN
Status: DISPENSED | OUTPATIENT
Start: 2019-01-01 | End: 2019-01-01

## 2019-01-01 RX ORDER — HYDROCODONE BITARTRATE AND ACETAMINOPHEN 10; 325 MG/1; MG/1
1 TABLET ORAL EVERY 4 HOURS PRN
Qty: 180 TAB | Refills: 0 | Status: SHIPPED | OUTPATIENT
Start: 2019-01-01 | End: 2019-01-01 | Stop reason: SDUPTHER

## 2019-01-01 RX ORDER — CALCITONIN SALMON 200 [IU]/.09ML
1 SPRAY, METERED NASAL DAILY
Status: DISCONTINUED | OUTPATIENT
Start: 2019-01-01 | End: 2019-01-01 | Stop reason: HOSPADM

## 2019-01-01 RX ORDER — MORPHINE SULFATE 15 MG/1
30 TABLET, FILM COATED, EXTENDED RELEASE ORAL EVERY 12 HOURS
Status: DISCONTINUED | OUTPATIENT
Start: 2019-01-01 | End: 2019-01-01 | Stop reason: HOSPADM

## 2019-01-01 RX ORDER — HYDROCODONE BITARTRATE AND ACETAMINOPHEN 10; 325 MG/1; MG/1
2 TABLET ORAL EVERY 4 HOURS PRN
Status: DISCONTINUED | OUTPATIENT
Start: 2019-01-01 | End: 2019-01-01 | Stop reason: HOSPADM

## 2019-01-01 RX ORDER — LACTULOSE 20 G/30ML
15 SOLUTION ORAL 3 TIMES DAILY PRN
Status: DISCONTINUED | OUTPATIENT
Start: 2019-01-01 | End: 2019-01-01 | Stop reason: HOSPADM

## 2019-01-01 RX ORDER — AMOXICILLIN 250 MG
2 CAPSULE ORAL 2 TIMES DAILY
Status: DISCONTINUED | OUTPATIENT
Start: 2019-01-01 | End: 2019-01-01 | Stop reason: HOSPADM

## 2019-01-01 RX ORDER — TRAZODONE HYDROCHLORIDE 50 MG/1
50 TABLET ORAL NIGHTLY PRN
COMMUNITY
End: 2019-01-01

## 2019-01-01 RX ORDER — ACETAMINOPHEN 325 MG/1
650 TABLET ORAL EVERY 6 HOURS PRN
Status: DISCONTINUED | OUTPATIENT
Start: 2019-01-01 | End: 2019-01-01

## 2019-01-01 RX ORDER — LISINOPRIL 40 MG/1
40 TABLET ORAL DAILY
Qty: 100 TAB | Refills: 3
Start: 2019-01-01 | End: 2020-01-01

## 2019-01-01 RX ORDER — HYDROCODONE BITARTRATE AND ACETAMINOPHEN 7.5; 325 MG/1; MG/1
1 TABLET ORAL EVERY 4 HOURS PRN
Qty: 180 TAB | Refills: 0 | Status: SHIPPED | OUTPATIENT
Start: 2019-01-01 | End: 2019-01-01 | Stop reason: SDUPTHER

## 2019-01-01 RX ORDER — ONDANSETRON 2 MG/ML
4 INJECTION INTRAMUSCULAR; INTRAVENOUS ONCE
Status: COMPLETED | OUTPATIENT
Start: 2019-01-01 | End: 2019-01-01

## 2019-01-01 RX ORDER — ONDANSETRON 4 MG/1
4 TABLET, ORALLY DISINTEGRATING ORAL EVERY 4 HOURS PRN
Status: DISCONTINUED | OUTPATIENT
Start: 2019-01-01 | End: 2019-01-01 | Stop reason: HOSPADM

## 2019-01-01 RX ORDER — LISINOPRIL 20 MG/1
40 TABLET ORAL DAILY
Status: DISCONTINUED | OUTPATIENT
Start: 2019-01-01 | End: 2019-01-01 | Stop reason: HOSPADM

## 2019-01-01 RX ORDER — BISACODYL 10 MG
10 SUPPOSITORY, RECTAL RECTAL
Status: DISCONTINUED | OUTPATIENT
Start: 2019-01-01 | End: 2019-01-01 | Stop reason: HOSPADM

## 2019-01-01 RX ORDER — HYDROCODONE BITARTRATE AND ACETAMINOPHEN 10; 325 MG/1; MG/1
1 TABLET ORAL
Status: DISCONTINUED | OUTPATIENT
Start: 2019-01-01 | End: 2019-01-01

## 2019-01-01 RX ORDER — DULOXETIN HYDROCHLORIDE 20 MG/1
20 CAPSULE, DELAYED RELEASE ORAL DAILY
Status: DISCONTINUED | OUTPATIENT
Start: 2019-01-01 | End: 2019-01-01 | Stop reason: HOSPADM

## 2019-01-01 RX ORDER — HYDROCODONE BITARTRATE AND ACETAMINOPHEN 10; 325 MG/1; MG/1
1 TABLET ORAL
Status: ON HOLD | COMMUNITY
End: 2019-01-01

## 2019-01-01 RX ORDER — HYDROCODONE BITARTRATE AND ACETAMINOPHEN 10; 325 MG/1; MG/1
1 TABLET ORAL EVERY 4 HOURS PRN
Status: DISCONTINUED | OUTPATIENT
Start: 2019-01-01 | End: 2019-01-01 | Stop reason: HOSPADM

## 2019-01-01 RX ORDER — ATORVASTATIN CALCIUM 40 MG/1
40 TABLET, FILM COATED ORAL
Status: DISCONTINUED | OUTPATIENT
Start: 2019-01-01 | End: 2019-01-01 | Stop reason: HOSPADM

## 2019-01-01 RX ORDER — HYDROCODONE BITARTRATE AND ACETAMINOPHEN 10; 325 MG/1; MG/1
1 TABLET ORAL EVERY 4 HOURS PRN
Status: DISCONTINUED | OUTPATIENT
Start: 2019-01-01 | End: 2019-01-01

## 2019-01-01 RX ORDER — CLOPIDOGREL BISULFATE 75 MG/1
75 TABLET ORAL DAILY
Status: DISCONTINUED | OUTPATIENT
Start: 2019-01-01 | End: 2019-01-01 | Stop reason: HOSPADM

## 2019-01-01 RX ADMIN — ASPIRIN 325 MG: 325 TABLET, COATED ORAL at 04:38

## 2019-01-01 RX ADMIN — POTASSIUM CHLORIDE 20 MEQ: 1500 TABLET, EXTENDED RELEASE ORAL at 05:01

## 2019-01-01 RX ADMIN — DULOXETINE HYDROCHLORIDE 20 MG: 20 CAPSULE, DELAYED RELEASE ORAL at 14:40

## 2019-01-01 RX ADMIN — ONDANSETRON 4 MG: 2 INJECTION INTRAMUSCULAR; INTRAVENOUS at 17:01

## 2019-01-01 RX ADMIN — PREDNISONE 5 MG: 5 TABLET ORAL at 05:19

## 2019-01-01 RX ADMIN — NICOTINE TRANSDERMAL SYSTEM 21 MG: 21 PATCH, EXTENDED RELEASE TRANSDERMAL at 05:38

## 2019-01-01 RX ADMIN — CALCIUM CARBONATE-CHOLECALCIFEROL TAB 250 MG-125 UNIT 2 TABLET: 250-125 TAB at 05:37

## 2019-01-01 RX ADMIN — LISINOPRIL 40 MG: 20 TABLET ORAL at 13:51

## 2019-01-01 RX ADMIN — HYDROCODONE BITARTRATE AND ACETAMINOPHEN 2 TABLET: 10; 325 TABLET ORAL at 00:09

## 2019-01-01 RX ADMIN — SENNOSIDES AND DOCUSATE SODIUM 2 TABLET: 8.6; 5 TABLET ORAL at 18:17

## 2019-01-01 RX ADMIN — CALCIUM CARBONATE-CHOLECALCIFEROL TAB 250 MG-125 UNIT 2 TABLET: 250-125 TAB at 05:43

## 2019-01-01 RX ADMIN — ASPIRIN 325 MG: 325 TABLET, COATED ORAL at 05:07

## 2019-01-01 RX ADMIN — SENNOSIDES AND DOCUSATE SODIUM 2 TABLET: 8.6; 5 TABLET ORAL at 06:08

## 2019-01-01 RX ADMIN — DILTIAZEM HYDROCHLORIDE 180 MG: 180 CAPSULE, COATED, EXTENDED RELEASE ORAL at 06:04

## 2019-01-01 RX ADMIN — CLOPIDOGREL BISULFATE 75 MG: 75 TABLET ORAL at 16:42

## 2019-01-01 RX ADMIN — POLYETHYLENE GLYCOL 3350 1 PACKET: 17 POWDER, FOR SOLUTION ORAL at 12:28

## 2019-01-01 RX ADMIN — POTASSIUM CHLORIDE AND SODIUM CHLORIDE: 900; 150 INJECTION, SOLUTION INTRAVENOUS at 16:41

## 2019-01-01 RX ADMIN — HYDROCODONE BITARTRATE AND ACETAMINOPHEN 1 TABLET: 10; 325 TABLET ORAL at 06:09

## 2019-01-01 RX ADMIN — CALCITONIN SALMON 200 UNITS: 200 SPRAY, METERED NASAL at 16:55

## 2019-01-01 RX ADMIN — PREDNISONE 5 MG: 5 TABLET ORAL at 05:01

## 2019-01-01 RX ADMIN — ENOXAPARIN SODIUM 30 MG: 100 INJECTION SUBCUTANEOUS at 05:08

## 2019-01-01 RX ADMIN — CALCIUM CARBONATE-CHOLECALCIFEROL TAB 250 MG-125 UNIT 2 TABLET: 250-125 TAB at 05:07

## 2019-01-01 RX ADMIN — HYDROCODONE BITARTRATE AND ACETAMINOPHEN 1 TABLET: 10; 325 TABLET ORAL at 22:09

## 2019-01-01 RX ADMIN — CALCIUM CARBONATE-CHOLECALCIFEROL TAB 250 MG-125 UNIT 2 TABLET: 250-125 TAB at 11:32

## 2019-01-01 RX ADMIN — IOHEXOL 70 ML: 350 INJECTION, SOLUTION INTRAVENOUS at 10:20

## 2019-01-01 RX ADMIN — CLOPIDOGREL BISULFATE 75 MG: 75 TABLET ORAL at 05:06

## 2019-01-01 RX ADMIN — CLOPIDOGREL BISULFATE 75 MG: 75 TABLET ORAL at 06:09

## 2019-01-01 RX ADMIN — TRAZODONE HYDROCHLORIDE 50 MG: 100 TABLET ORAL at 22:44

## 2019-01-01 RX ADMIN — SENNOSIDES AND DOCUSATE SODIUM 2 TABLET: 8.6; 5 TABLET ORAL at 18:09

## 2019-01-01 RX ADMIN — POTASSIUM CHLORIDE 20 MEQ: 1500 TABLET, EXTENDED RELEASE ORAL at 04:39

## 2019-01-01 RX ADMIN — MORPHINE SULFATE 30 MG: 15 TABLET, EXTENDED RELEASE ORAL at 05:19

## 2019-01-01 RX ADMIN — PREDNISONE 5 MG: 5 TABLET ORAL at 05:38

## 2019-01-01 RX ADMIN — DULOXETINE HYDROCHLORIDE 20 MG: 20 CAPSULE, DELAYED RELEASE ORAL at 05:43

## 2019-01-01 RX ADMIN — ASPIRIN 325 MG: 325 TABLET, COATED ORAL at 05:19

## 2019-01-01 RX ADMIN — ATORVASTATIN CALCIUM 40 MG: 40 TABLET, FILM COATED ORAL at 20:34

## 2019-01-01 RX ADMIN — POLYETHYLENE GLYCOL 3350 1 PACKET: 17 POWDER, FOR SOLUTION ORAL at 05:37

## 2019-01-01 RX ADMIN — ATORVASTATIN CALCIUM 40 MG: 40 TABLET, FILM COATED ORAL at 19:48

## 2019-01-01 RX ADMIN — PREDNISONE 5 MG: 5 TABLET ORAL at 05:43

## 2019-01-01 RX ADMIN — CLOPIDOGREL BISULFATE 75 MG: 75 TABLET ORAL at 05:43

## 2019-01-01 RX ADMIN — PREDNISONE 5 MG: 5 TABLET ORAL at 06:09

## 2019-01-01 RX ADMIN — ENOXAPARIN SODIUM 30 MG: 100 INJECTION SUBCUTANEOUS at 05:00

## 2019-01-01 RX ADMIN — SENNOSIDES AND DOCUSATE SODIUM 2 TABLET: 8.6; 5 TABLET ORAL at 05:00

## 2019-01-01 RX ADMIN — HYDROCODONE BITARTRATE AND ACETAMINOPHEN 1 TABLET: 10; 325 TABLET ORAL at 20:30

## 2019-01-01 RX ADMIN — ATORVASTATIN CALCIUM 40 MG: 40 TABLET, FILM COATED ORAL at 22:44

## 2019-01-01 RX ADMIN — LIDOCAINE 1 PATCH: 50 PATCH TOPICAL at 12:08

## 2019-01-01 RX ADMIN — SENNOSIDES AND DOCUSATE SODIUM 2 TABLET: 8.6; 5 TABLET ORAL at 17:21

## 2019-01-01 RX ADMIN — HYDROCODONE BITARTRATE AND ACETAMINOPHEN 1 TABLET: 10; 325 TABLET ORAL at 19:17

## 2019-01-01 RX ADMIN — DILTIAZEM HYDROCHLORIDE 180 MG: 180 CAPSULE, COATED, EXTENDED RELEASE ORAL at 05:00

## 2019-01-01 RX ADMIN — LIDOCAINE 1 PATCH: 50 PATCH TOPICAL at 11:32

## 2019-01-01 RX ADMIN — NICOTINE TRANSDERMAL SYSTEM 21 MG: 21 PATCH, EXTENDED RELEASE TRANSDERMAL at 05:45

## 2019-01-01 RX ADMIN — PREDNISONE 5 MG: 5 TABLET ORAL at 05:07

## 2019-01-01 RX ADMIN — HYDROCODONE BITARTRATE AND ACETAMINOPHEN 1 TABLET: 10; 325 TABLET ORAL at 18:08

## 2019-01-01 RX ADMIN — ATORVASTATIN CALCIUM 40 MG: 40 TABLET, FILM COATED ORAL at 20:55

## 2019-01-01 RX ADMIN — SENNOSIDES AND DOCUSATE SODIUM 2 TABLET: 8.6; 5 TABLET ORAL at 05:38

## 2019-01-01 RX ADMIN — CALCITONIN SALMON 200 UNITS: 200 SPRAY, METERED NASAL at 06:08

## 2019-01-01 RX ADMIN — HYDROCODONE BITARTRATE AND ACETAMINOPHEN 1 TABLET: 10; 325 TABLET ORAL at 22:44

## 2019-01-01 RX ADMIN — SENNOSIDES AND DOCUSATE SODIUM 2 TABLET: 8.6; 5 TABLET ORAL at 16:55

## 2019-01-01 RX ADMIN — HYDROCODONE BITARTRATE AND ACETAMINOPHEN 1 TABLET: 10; 325 TABLET ORAL at 04:41

## 2019-01-01 RX ADMIN — CALCIUM CARBONATE-CHOLECALCIFEROL TAB 250 MG-125 UNIT 2 TABLET: 250-125 TAB at 16:53

## 2019-01-01 RX ADMIN — MORPHINE SULFATE 30 MG: 15 TABLET, EXTENDED RELEASE ORAL at 17:09

## 2019-01-01 RX ADMIN — MORPHINE SULFATE 1 MG: 4 INJECTION INTRAVENOUS at 13:14

## 2019-01-01 RX ADMIN — HYDROCODONE BITARTRATE AND ACETAMINOPHEN 2 TABLET: 10; 325 TABLET ORAL at 05:37

## 2019-01-01 RX ADMIN — POTASSIUM CHLORIDE 20 MEQ: 1500 TABLET, EXTENDED RELEASE ORAL at 13:55

## 2019-01-01 RX ADMIN — PREDNISONE 5 MG: 5 TABLET ORAL at 06:04

## 2019-01-01 RX ADMIN — TRAZODONE HYDROCHLORIDE 50 MG: 100 TABLET ORAL at 22:08

## 2019-01-01 RX ADMIN — DULOXETINE HYDROCHLORIDE 20 MG: 20 CAPSULE, DELAYED RELEASE ORAL at 05:19

## 2019-01-01 RX ADMIN — CALCITONIN SALMON 200 UNITS: 200 SPRAY, METERED NASAL at 05:18

## 2019-01-01 RX ADMIN — MORPHINE SULFATE 30 MG: 15 TABLET, EXTENDED RELEASE ORAL at 05:42

## 2019-01-01 RX ADMIN — HYDROCODONE BITARTRATE AND ACETAMINOPHEN 1 TABLET: 10; 325 TABLET ORAL at 14:50

## 2019-01-01 RX ADMIN — LACTULOSE 15 ML: 20 SOLUTION ORAL at 14:40

## 2019-01-01 RX ADMIN — SENNOSIDES AND DOCUSATE SODIUM 2 TABLET: 8.6; 5 TABLET ORAL at 17:10

## 2019-01-01 RX ADMIN — CALCIUM CARBONATE-CHOLECALCIFEROL TAB 250 MG-125 UNIT 2 TABLET: 250-125 TAB at 18:17

## 2019-01-01 RX ADMIN — POTASSIUM CHLORIDE 20 MEQ: 1500 TABLET, EXTENDED RELEASE ORAL at 05:07

## 2019-01-01 RX ADMIN — CALCIUM CARBONATE-CHOLECALCIFEROL TAB 250 MG-125 UNIT 2 TABLET: 250-125 TAB at 05:00

## 2019-01-01 RX ADMIN — CALCITONIN SALMON 200 UNITS: 200 SPRAY, METERED NASAL at 05:41

## 2019-01-01 RX ADMIN — MORPHINE SULFATE 4 MG: 4 INJECTION INTRAVENOUS at 09:30

## 2019-01-01 RX ADMIN — HYDROCODONE BITARTRATE AND ACETAMINOPHEN 1 TABLET: 10; 325 TABLET ORAL at 11:24

## 2019-01-01 RX ADMIN — MORPHINE SULFATE 15 MG: 15 TABLET, EXTENDED RELEASE ORAL at 17:24

## 2019-01-01 RX ADMIN — LISINOPRIL 40 MG: 20 TABLET ORAL at 05:38

## 2019-01-01 RX ADMIN — HYDROCODONE BITARTRATE AND ACETAMINOPHEN 1 TABLET: 10; 325 TABLET ORAL at 18:16

## 2019-01-01 RX ADMIN — ACETAMINOPHEN 650 MG: 325 TABLET, FILM COATED ORAL at 09:25

## 2019-01-01 RX ADMIN — HYDROCODONE BITARTRATE AND ACETAMINOPHEN 2 TABLET: 10; 325 TABLET ORAL at 11:32

## 2019-01-01 RX ADMIN — ATORVASTATIN CALCIUM 40 MG: 40 TABLET, FILM COATED ORAL at 20:30

## 2019-01-01 RX ADMIN — NICOTINE TRANSDERMAL SYSTEM 21 MG: 21 PATCH, EXTENDED RELEASE TRANSDERMAL at 05:07

## 2019-01-01 RX ADMIN — LISINOPRIL 40 MG: 20 TABLET ORAL at 06:09

## 2019-01-01 RX ADMIN — HYDROCODONE BITARTRATE AND ACETAMINOPHEN 1 TABLET: 10; 325 TABLET ORAL at 16:59

## 2019-01-01 RX ADMIN — LISINOPRIL 40 MG: 20 TABLET ORAL at 05:01

## 2019-01-01 RX ADMIN — DILTIAZEM HYDROCHLORIDE 180 MG: 180 CAPSULE, COATED, EXTENDED RELEASE ORAL at 05:06

## 2019-01-01 RX ADMIN — CALCITONIN SALMON 200 UNITS: 200 SPRAY, METERED NASAL at 05:07

## 2019-01-01 RX ADMIN — MORPHINE SULFATE 15 MG: 15 TABLET, EXTENDED RELEASE ORAL at 10:06

## 2019-01-01 RX ADMIN — CLOPIDOGREL BISULFATE 75 MG: 75 TABLET ORAL at 05:01

## 2019-01-01 RX ADMIN — LISINOPRIL 40 MG: 20 TABLET ORAL at 05:44

## 2019-01-01 RX ADMIN — ATORVASTATIN CALCIUM 40 MG: 40 TABLET, FILM COATED ORAL at 20:21

## 2019-01-01 RX ADMIN — MORPHINE SULFATE 1 MG: 4 INJECTION INTRAVENOUS at 07:34

## 2019-01-01 RX ADMIN — HYDROCODONE BITARTRATE AND ACETAMINOPHEN 2 TABLET: 10; 325 TABLET ORAL at 23:13

## 2019-01-01 RX ADMIN — NICOTINE TRANSDERMAL SYSTEM 21 MG: 21 PATCH, EXTENDED RELEASE TRANSDERMAL at 05:19

## 2019-01-01 RX ADMIN — HYDROCODONE BITARTRATE AND ACETAMINOPHEN 1 TABLET: 10; 325 TABLET ORAL at 06:04

## 2019-01-01 RX ADMIN — LISINOPRIL 40 MG: 20 TABLET ORAL at 05:19

## 2019-01-01 RX ADMIN — DILTIAZEM HYDROCHLORIDE 180 MG: 180 CAPSULE, COATED, EXTENDED RELEASE ORAL at 05:37

## 2019-01-01 RX ADMIN — LISINOPRIL 40 MG: 20 TABLET ORAL at 06:05

## 2019-01-01 RX ADMIN — LISINOPRIL 40 MG: 20 TABLET ORAL at 05:07

## 2019-01-01 RX ADMIN — DILTIAZEM HYDROCHLORIDE 180 MG: 180 CAPSULE, COATED, EXTENDED RELEASE ORAL at 05:43

## 2019-01-01 RX ADMIN — CLOPIDOGREL BISULFATE 75 MG: 75 TABLET ORAL at 04:39

## 2019-01-01 RX ADMIN — CALCIUM CARBONATE-CHOLECALCIFEROL TAB 250 MG-125 UNIT 2 TABLET: 250-125 TAB at 04:39

## 2019-01-01 RX ADMIN — HYDROCODONE BITARTRATE AND ACETAMINOPHEN 1 TABLET: 10; 325 TABLET ORAL at 10:08

## 2019-01-01 RX ADMIN — ASPIRIN 325 MG: 325 TABLET, COATED ORAL at 06:05

## 2019-01-01 RX ADMIN — CLOPIDOGREL BISULFATE 75 MG: 75 TABLET ORAL at 06:04

## 2019-01-01 RX ADMIN — SENNOSIDES AND DOCUSATE SODIUM 2 TABLET: 8.6; 5 TABLET ORAL at 17:24

## 2019-01-01 RX ADMIN — CALCIUM CARBONATE-CHOLECALCIFEROL TAB 250 MG-125 UNIT 2 TABLET: 250-125 TAB at 12:47

## 2019-01-01 RX ADMIN — PREDNISONE 5 MG: 5 TABLET ORAL at 04:39

## 2019-01-01 RX ADMIN — ATORVASTATIN CALCIUM 40 MG: 40 TABLET, FILM COATED ORAL at 22:34

## 2019-01-01 RX ADMIN — NICOTINE TRANSDERMAL SYSTEM 21 MG: 21 PATCH, EXTENDED RELEASE TRANSDERMAL at 06:09

## 2019-01-01 RX ADMIN — DILTIAZEM HYDROCHLORIDE 180 MG: 180 CAPSULE, COATED, EXTENDED RELEASE ORAL at 04:52

## 2019-01-01 RX ADMIN — CALCIUM CARBONATE-CHOLECALCIFEROL TAB 250 MG-125 UNIT 2 TABLET: 250-125 TAB at 11:24

## 2019-01-01 RX ADMIN — MORPHINE SULFATE 30 MG: 15 TABLET, EXTENDED RELEASE ORAL at 17:06

## 2019-01-01 RX ADMIN — SENNOSIDES AND DOCUSATE SODIUM 2 TABLET: 8.6; 5 TABLET ORAL at 05:07

## 2019-01-01 RX ADMIN — ASPIRIN 325 MG: 325 TABLET, COATED ORAL at 05:43

## 2019-01-01 RX ADMIN — HYDROCODONE BITARTRATE AND ACETAMINOPHEN 1 TABLET: 10; 325 TABLET ORAL at 12:16

## 2019-01-01 RX ADMIN — HYDROCODONE BITARTRATE AND ACETAMINOPHEN 2 TABLET: 10; 325 TABLET ORAL at 19:45

## 2019-01-01 RX ADMIN — CALCIUM CARBONATE-CHOLECALCIFEROL TAB 250 MG-125 UNIT 2 TABLET: 250-125 TAB at 06:09

## 2019-01-01 RX ADMIN — CALCIUM CARBONATE-CHOLECALCIFEROL TAB 250 MG-125 UNIT 2 TABLET: 250-125 TAB at 17:24

## 2019-01-01 RX ADMIN — MORPHINE SULFATE 4 MG: 4 INJECTION INTRAVENOUS at 13:55

## 2019-01-01 RX ADMIN — ENOXAPARIN SODIUM 30 MG: 100 INJECTION SUBCUTANEOUS at 06:05

## 2019-01-01 RX ADMIN — SENNOSIDES AND DOCUSATE SODIUM 2 TABLET: 8.6; 5 TABLET ORAL at 06:04

## 2019-01-01 RX ADMIN — CALCIUM CARBONATE-CHOLECALCIFEROL TAB 250 MG-125 UNIT 2 TABLET: 250-125 TAB at 12:07

## 2019-01-01 RX ADMIN — ATORVASTATIN CALCIUM 40 MG: 40 TABLET, FILM COATED ORAL at 22:08

## 2019-01-01 RX ADMIN — CALCITONIN SALMON 200 UNITS: 200 SPRAY, METERED NASAL at 04:53

## 2019-01-01 RX ADMIN — CALCIUM CARBONATE-CHOLECALCIFEROL TAB 250 MG-125 UNIT 2 TABLET: 250-125 TAB at 17:09

## 2019-01-01 RX ADMIN — HYDROCODONE BITARTRATE AND ACETAMINOPHEN 2 TABLET: 10; 325 TABLET ORAL at 05:07

## 2019-01-01 RX ADMIN — HYDROCODONE BITARTRATE AND ACETAMINOPHEN 1 TABLET: 10; 325 TABLET ORAL at 22:34

## 2019-01-01 RX ADMIN — MORPHINE SULFATE 30 MG: 15 TABLET, EXTENDED RELEASE ORAL at 17:00

## 2019-01-01 RX ADMIN — ASPIRIN 325 MG: 325 TABLET, COATED ORAL at 06:09

## 2019-01-01 RX ADMIN — DILTIAZEM HYDROCHLORIDE 180 MG: 180 CAPSULE, COATED, EXTENDED RELEASE ORAL at 06:09

## 2019-01-01 RX ADMIN — HYDROCODONE BITARTRATE AND ACETAMINOPHEN 1 TABLET: 10; 325 TABLET ORAL at 20:21

## 2019-01-01 RX ADMIN — DILTIAZEM HYDROCHLORIDE 180 MG: 180 CAPSULE, COATED, EXTENDED RELEASE ORAL at 17:00

## 2019-01-01 RX ADMIN — ASPIRIN 325 MG: 325 TABLET, COATED ORAL at 05:00

## 2019-01-01 RX ADMIN — LACTULOSE 15 ML: 20 SOLUTION ORAL at 17:10

## 2019-01-01 RX ADMIN — ENOXAPARIN SODIUM 30 MG: 100 INJECTION SUBCUTANEOUS at 17:51

## 2019-01-01 RX ADMIN — POLYETHYLENE GLYCOL 3350 1 PACKET: 17 POWDER, FOR SOLUTION ORAL at 05:00

## 2019-01-01 RX ADMIN — CALCIUM CARBONATE-CHOLECALCIFEROL TAB 250 MG-125 UNIT 2 TABLET: 250-125 TAB at 05:19

## 2019-01-01 RX ADMIN — CALCIUM CARBONATE-CHOLECALCIFEROL TAB 250 MG-125 UNIT 2 TABLET: 250-125 TAB at 11:47

## 2019-01-01 RX ADMIN — ENOXAPARIN SODIUM 30 MG: 100 INJECTION SUBCUTANEOUS at 04:34

## 2019-01-01 RX ADMIN — CALCIUM CARBONATE-CHOLECALCIFEROL TAB 250 MG-125 UNIT 2 TABLET: 250-125 TAB at 18:10

## 2019-01-01 RX ADMIN — PREDNISONE 5 MG: 5 TABLET ORAL at 16:42

## 2019-01-01 RX ADMIN — CLOPIDOGREL BISULFATE 75 MG: 75 TABLET ORAL at 05:37

## 2019-01-01 RX ADMIN — MORPHINE SULFATE 15 MG: 15 TABLET, EXTENDED RELEASE ORAL at 05:07

## 2019-01-01 RX ADMIN — HYDROCODONE BITARTRATE AND ACETAMINOPHEN 1 TABLET: 10; 325 TABLET ORAL at 15:21

## 2019-01-01 RX ADMIN — ONDANSETRON 4 MG: 2 INJECTION INTRAMUSCULAR; INTRAVENOUS at 09:29

## 2019-01-01 RX ADMIN — HYDROCODONE BITARTRATE AND ACETAMINOPHEN 1 TABLET: 10; 325 TABLET ORAL at 11:45

## 2019-01-01 RX ADMIN — CALCIUM CARBONATE-CHOLECALCIFEROL TAB 250 MG-125 UNIT 2 TABLET: 250-125 TAB at 16:42

## 2019-01-01 RX ADMIN — ASPIRIN 325 MG: 325 TABLET, COATED ORAL at 16:42

## 2019-01-01 RX ADMIN — HYDROCODONE BITARTRATE AND ACETAMINOPHEN 1 TABLET: 10; 325 TABLET ORAL at 05:01

## 2019-01-01 RX ADMIN — ENOXAPARIN SODIUM 30 MG: 100 INJECTION SUBCUTANEOUS at 06:10

## 2019-01-01 RX ADMIN — CALCITONIN SALMON 200 UNITS: 200 SPRAY, METERED NASAL at 06:05

## 2019-01-01 RX ADMIN — SENNOSIDES AND DOCUSATE SODIUM 2 TABLET: 8.6; 5 TABLET ORAL at 04:35

## 2019-01-01 RX ADMIN — CALCITONIN SALMON 200 UNITS: 200 SPRAY, METERED NASAL at 05:00

## 2019-01-01 RX ADMIN — POTASSIUM CHLORIDE 20 MEQ: 1500 TABLET, EXTENDED RELEASE ORAL at 05:19

## 2019-01-01 RX ADMIN — POTASSIUM CHLORIDE 20 MEQ: 1500 TABLET, EXTENDED RELEASE ORAL at 05:42

## 2019-01-01 RX ADMIN — CALCIUM CARBONATE-CHOLECALCIFEROL TAB 250 MG-125 UNIT 2 TABLET: 250-125 TAB at 17:26

## 2019-01-01 RX ADMIN — CALCIUM CARBONATE-CHOLECALCIFEROL TAB 250 MG-125 UNIT 2 TABLET: 250-125 TAB at 13:02

## 2019-01-01 RX ADMIN — LIDOCAINE 1 PATCH: 50 PATCH TOPICAL at 11:46

## 2019-01-01 RX ADMIN — CALCIUM CARBONATE-CHOLECALCIFEROL TAB 250 MG-125 UNIT 2 TABLET: 250-125 TAB at 11:45

## 2019-01-01 RX ADMIN — NICOTINE TRANSDERMAL SYSTEM 21 MG: 21 PATCH, EXTENDED RELEASE TRANSDERMAL at 17:21

## 2019-01-01 RX ADMIN — HYDROCODONE BITARTRATE AND ACETAMINOPHEN 1 TABLET: 10; 325 TABLET ORAL at 10:26

## 2019-01-01 RX ADMIN — POTASSIUM CHLORIDE 20 MEQ: 1500 TABLET, EXTENDED RELEASE ORAL at 05:38

## 2019-01-01 RX ADMIN — CALCIUM CARBONATE-CHOLECALCIFEROL TAB 250 MG-125 UNIT 2 TABLET: 250-125 TAB at 11:33

## 2019-01-01 RX ADMIN — CALCIUM CARBONATE-CHOLECALCIFEROL TAB 250 MG-125 UNIT 2 TABLET: 250-125 TAB at 17:06

## 2019-01-01 RX ADMIN — POTASSIUM CHLORIDE 20 MEQ: 1500 TABLET, EXTENDED RELEASE ORAL at 06:04

## 2019-01-01 RX ADMIN — LIDOCAINE 1 PATCH: 50 PATCH TOPICAL at 12:18

## 2019-01-01 RX ADMIN — LIDOCAINE 1 PATCH: 50 PATCH TOPICAL at 12:45

## 2019-01-01 RX ADMIN — CALCITONIN SALMON 200 UNITS: 200 SPRAY, METERED NASAL at 05:37

## 2019-01-01 RX ADMIN — POTASSIUM CHLORIDE 20 MEQ: 1500 TABLET, EXTENDED RELEASE ORAL at 06:09

## 2019-01-01 RX ADMIN — HYDROCODONE BITARTRATE AND ACETAMINOPHEN 1 TABLET: 10; 325 TABLET ORAL at 14:37

## 2019-01-01 RX ADMIN — MORPHINE SULFATE 30 MG: 15 TABLET, EXTENDED RELEASE ORAL at 05:37

## 2019-01-01 RX ADMIN — LISINOPRIL 40 MG: 20 TABLET ORAL at 04:39

## 2019-01-01 RX ADMIN — HYDROCODONE BITARTRATE AND ACETAMINOPHEN 2 TABLET: 10; 325 TABLET ORAL at 16:33

## 2019-01-01 RX ADMIN — CALCIUM CARBONATE-CHOLECALCIFEROL TAB 250 MG-125 UNIT 2 TABLET: 250-125 TAB at 06:04

## 2019-01-01 RX ADMIN — CLOPIDOGREL BISULFATE 75 MG: 75 TABLET ORAL at 05:18

## 2019-01-01 RX ADMIN — ASPIRIN 325 MG: 325 TABLET, COATED ORAL at 05:37

## 2019-01-01 SDOH — ECONOMIC STABILITY: HOUSING INSECURITY: EVIDENCE OF SMOKING MATERIAL: 1

## 2019-01-01 SDOH — ECONOMIC STABILITY: HOUSING INSECURITY
HOME SAFETY: PT CONTINUES TO SMOKE. SHE IS COMPLIANT IN REMOVING HER NASAL CANNULA AND TURNING OFF HER CONCENTRATOR WHEN SHE SMOKES. SHE IS AWARE THAT THERE ARE STILL RISKS WITH SMOKING.

## 2019-01-01 SDOH — ECONOMIC STABILITY: HOUSING INSECURITY: EVIDENCE OF SMOKING MATERIAL: 0

## 2019-01-01 ASSESSMENT — ENCOUNTER SYMPTOMS
LAST BOWEL MOVEMENT: 65350
DIZZINESS: 0
FOCAL WEAKNESS: 0
ABDOMINAL PAIN: 0
PALPITATIONS: 0
HALLUCINATIONS: 0
SHORTNESS OF BREATH: 0
LOSS OF CONSCIOUSNESS: 0
CONTUSION: 1
DEPRESSION: 0
STOOL FREQUENCY: LESS THAN DAILY
SENSORY CHANGE: 0
BOWEL PATTERN NORMAL: 1
FALLS: 0
WEAKNESS: 0
CHILLS: 0
SPEECH CHANGE: 0
LOSS OF CONSCIOUSNESS: 0
NAUSEA: 0
DOUBLE VISION: 0
SEIZURES: 0
BACK PAIN: 1
LAST BOWEL MOVEMENT: 65326
SPEECH CHANGE: 0
STOOL FREQUENCY: LESS THAN DAILY
STOOL FREQUENCY: LESS THAN DAILY
LOSS OF CONSCIOUSNESS: 0
FATIGUES EASILY: 1
NERVOUS/ANXIOUS: 0
FEVER: 0
SPEECH CHANGE: 0
MEMORY LOSS: 0
SPUTUM PRODUCTION: 0
STRIDOR: 0
FOCAL WEAKNESS: 0
COUGH CHARACTERISTICS: DRY
FATIGUE: 1
NAUSEA: 0
VOMITING: 0
SLEEP QUALITY: ADEQUATE
SENSORY CHANGE: 0
BOWEL PATTERN NORMAL: 1
COUGH: 0
CHILLS: 0
FEVER: 0
WEAKNESS: 0
MYALGIAS: 1
HEADACHES: 0
TREMORS: 0
FEVER: 0
FOCAL WEAKNESS: 0
BOWEL PATTERN NORMAL: 1
MYALGIAS: 1
FLANK PAIN: 0
ABDOMINAL PAIN: 0
PALPITATIONS: 0
SENSORY CHANGE: 0
DEPRESSION: 0
LAST BOWEL MOVEMENT: 65373
LOWER EXTREMITY EDEMA: 1
FEVER: 0
COUGH: 0
LOSS OF CONSCIOUSNESS: 0
SPUTUM PRODUCTION: 0
FLANK PAIN: 0
CHILLS: 0
COUGH: 0
TREMORS: 0
WEAKNESS: 0
STOOL FREQUENCY: LESS THAN DAILY
CHILLS: 0
SPUTUM PRODUCTION: 0
FEVER: 0
MYALGIAS: 1
BOWEL PATTERN NORMAL: 1
BACK PAIN: 1
WEAKNESS: 0
FATIGUE: 1
NERVOUS/ANXIOUS: 0
MEMORY LOSS: 0
PHOTOPHOBIA: 0
SLEEP QUALITY: ADEQUATE
PALPITATIONS: 0
FLANK PAIN: 0
SLEEP QUALITY: ADEQUATE
CHILLS: 0
FLANK PAIN: 0
MEMORY LOSS: 0
COUGH CHARACTERISTICS: WEAK
FLANK PAIN: 0
FATIGUES EASILY: 1
WEIGHT LOSS: 1
WHEEZING: 0
SHORTNESS OF BREATH: 0
NAUSEA: 1
FALLS: 0
COUGH CHARACTERISTICS: WEAK
STOOL FREQUENCY: LESS THAN DAILY
WHEEZING: 0
POLYDIPSIA: 0
SPEECH CHANGE: 0
WEAKNESS: 0
STOOL FREQUENCY: LESS THAN DAILY
FLANK PAIN: 0
FATIGUE: 1
DIZZINESS: 0
SPUTUM PRODUCTION: 0
COUGH: 1
PALPITATIONS: 0
WHEEZING: 0
NAUSEA: 0
DEPRESSION: 0
COUGH CHARACTERISTICS: DRY
SENSORY CHANGE: 0
VOMITING: 0
FALLS: 0
DIAPHORESIS: 0
BACK PAIN: 1
NAUSEA: 0
LAST BOWEL MOVEMENT: 65326
NERVOUS/ANXIOUS: 0
VOMITING: 0
DIZZINESS: 0
MYALGIAS: 1
HEADACHES: 0
DEPRESSION: 0
HEADACHES: 0
WHEEZING: 0
COUGH: 0
DIAPHORESIS: 0
FEVER: 0
FATIGUE: 1
PALPITATIONS: 0
SHORTNESS OF BREATH: 0
ABDOMINAL PAIN: 0
DIAPHORESIS: 0
SEIZURES: 0
SENSORY CHANGE: 0
FEVER: 0
DYSPNEA ON EXERTION: 1
MEMORY LOSS: 0
MYALGIAS: 1
COUGH: 0
DIZZINESS: 0
NAUSEA: 0
HEMOPTYSIS: 0
VOMITING: 0
DYSPNEA ON EXERTION: 1
HEADACHES: 0
SLEEP QUALITY: ADEQUATE
DYSPNEA ON EXERTION: 1
NERVOUS/ANXIOUS: 0
NERVOUS/ANXIOUS: 0
HEADACHES: 0
SPUTUM PRODUCTION: 0
FATIGUES EASILY: 1
SLEEP QUALITY: ADEQUATE
FOCAL WEAKNESS: 0
SPEECH CHANGE: 0
DYSPNEA ON EXERTION: 1
SHORTNESS OF BREATH: 0
SHORTNESS OF BREATH: 0
FALLS: 0
WHEEZING: 0
DIZZINESS: 0
LAST BOWEL MOVEMENT: 65357
TREMORS: 0
HEADACHES: 0
DIAPHORESIS: 0
FOCAL WEAKNESS: 0
ABDOMINAL PAIN: 0
WHEEZING: 0
SEIZURES: 0
FATIGUES EASILY: 1
LOSS OF CONSCIOUSNESS: 0
FATIGUE: 1
ABDOMINAL PAIN: 0
ORTHOPNEA: 0
VOMITING: 0
SEIZURES: 0
SEIZURES: 0
MYALGIAS: 1
MEMORY LOSS: 0
DYSPNEA ON EXERTION: 1
SPUTUM PRODUCTION: 0
FATIGUE: 1
WEAKNESS: 0
LAST BOWEL MOVEMENT: 65326
BOWEL PATTERN NORMAL: 1
BRUISES/BLEEDS EASILY: 0
COUGH: 0
NERVOUS/ANXIOUS: 0
COUGH: 1
DIAPHORESIS: 0
PALPITATIONS: 0
TREMORS: 0
COUGH: 0
TREMORS: 0
FATIGUES EASILY: 1
FLANK PAIN: 0
SHORTNESS OF BREATH: 0
MYALGIAS: 1
DEPRESSION: 0
DIZZINESS: 0
WEAKNESS: 0
BACK PAIN: 1
BOWEL PATTERN NORMAL: 1
LAST BOWEL MOVEMENT: 65364
NERVOUS/ANXIOUS: 0
MEMORY LOSS: 0
DEPRESSION: 0
SLEEP QUALITY: FAIR
LAST BOWEL MOVEMENT: 65336
SENSORY CHANGE: 0
COUGH CHARACTERISTICS: NON-PRODUCTIVE
VOMITING: 0
VOMITING: 0
SPEECH CHANGE: 0
SPEECH CHANGE: 0
SENSORY CHANGE: 0
HEADACHES: 0
DEPRESSION: 0
ABDOMINAL PAIN: 0
WHEEZING: 0
FALLS: 0
BACK PAIN: 1
BACK PAIN: 1
ABDOMINAL PAIN: 0
STOOL FREQUENCY: LESS THAN DAILY
BACK PAIN: 1
PALPITATIONS: 0
FATIGUE: 1
NAUSEA: 0
CHILLS: 0
FALLS: 0
TREMORS: 0
DYSPNEA ON EXERTION: 1
FATIGUES EASILY: 1
DIZZINESS: 0
FALLS: 0
DIAPHORESIS: 0
SLEEP QUALITY: FAIR
CHILLS: 0
SLEEP QUALITY: ADEQUATE
FOCAL WEAKNESS: 0
DIZZINESS: 0
MEMORY LOSS: 0
PALPITATIONS: 0
SLEEP QUALITY: ADEQUATE
SHORTNESS OF BREATH: 0
SPUTUM PRODUCTION: 0
BOWEL PATTERN NORMAL: 1
COUGH: 0
BOWEL PATTERN NORMAL: 1
NAUSEA: 0
FOCAL WEAKNESS: 0
COUGH CHARACTERISTICS: NON-PRODUCTIVE
TREMORS: 0
SPUTUM PRODUCTION: 0
SEIZURES: 0
CHILLS: 0
BACK PAIN: 1
DIAPHORESIS: 0
VOMITING: 0
STOOL FREQUENCY: LESS THAN DAILY
SEIZURES: 0

## 2019-01-01 ASSESSMENT — COGNITIVE AND FUNCTIONAL STATUS - GENERAL
SUGGESTED CMS G CODE MODIFIER MOBILITY: CL
TURNING FROM BACK TO SIDE WHILE IN FLAT BAD: A LITTLE
TOILETING: A LITTLE
MOVING FROM LYING ON BACK TO SITTING ON SIDE OF FLAT BED: A LOT
PERSONAL GROOMING: A LITTLE
CLIMB 3 TO 5 STEPS WITH RAILING: A LOT
MOBILITY SCORE: 14
DRESSING REGULAR UPPER BODY CLOTHING: A LITTLE
DRESSING REGULAR LOWER BODY CLOTHING: A LITTLE
DAILY ACTIVITIY SCORE: 19
SUGGESTED CMS G CODE MODIFIER DAILY ACTIVITY: CK
WALKING IN HOSPITAL ROOM: A LITTLE
MOVING TO AND FROM BED TO CHAIR: A LOT
STANDING UP FROM CHAIR USING ARMS: A LOT
HELP NEEDED FOR BATHING: A LITTLE

## 2019-01-01 ASSESSMENT — SOCIAL DETERMINANTS OF HEALTH (SDOH)
ACTIVE STRESSOR - HEALTH CHANGES: 1

## 2019-01-01 ASSESSMENT — COPD QUESTIONNAIRES
COPD SCREENING SCORE: 4
HAVE YOU SMOKED AT LEAST 100 CIGARETTES IN YOUR ENTIRE LIFE: YES
DO YOU EVER COUGH UP ANY MUCUS OR PHLEGM?: NO/ONLY WITH OCCASIONAL COLDS OR INFECTIONS
DURING THE PAST 4 WEEKS HOW MUCH DID YOU FEEL SHORT OF BREATH: NONE/LITTLE OF THE TIME

## 2019-01-01 ASSESSMENT — LIFESTYLE VARIABLES
DOES PATIENT WANT TO STOP DRINKING: NO
ON A TYPICAL DAY WHEN YOU DRINK ALCOHOL HOW MANY DRINKS DO YOU HAVE: 0
HAVE PEOPLE ANNOYED YOU BY CRITICIZING YOUR DRINKING: NO
EVER_SMOKED: YES
HAVE YOU EVER FELT YOU SHOULD CUT DOWN ON YOUR DRINKING: NO
ALCOHOL_USE: NO
CONSUMPTION TOTAL: INCOMPLETE
AVERAGE NUMBER OF DAYS PER WEEK YOU HAVE A DRINK CONTAINING ALCOHOL: 0
EVER FELT BAD OR GUILTY ABOUT YOUR DRINKING: NO
DO YOU DRINK ALCOHOL: NO
EVER_SMOKED: YES
HOW MANY TIMES IN THE PAST YEAR HAVE YOU HAD 5 OR MORE DRINKS IN A DAY: 0

## 2019-01-01 ASSESSMENT — ACTIVITIES OF DAILY LIVING (ADL)
MONEY MANAGEMENT (EXPENSES/BILLS): NEEDS ASSISTANCE
MONEY MANAGEMENT (EXPENSES/BILLS): NEEDS ASSISTANCE
PHYSICAL_TRANSFER_REQUIRES_ASSISTANCE: 1
MONEY MANAGEMENT (EXPENSES/BILLS): NEEDS ASSISTANCE
MONEY MANAGEMENT (EXPENSES/BILLS): NEEDS ASSISTANCE
AMBULATION_REQUIRES_ASSISTANCE: 1
BATHING_REQUIRES_ASSISTANCE: 1
MONEY MANAGEMENT (EXPENSES/BILLS): NEEDS ASSISTANCE
DRESSING_REQUIRES_ASSISTANCE: 1

## 2019-01-01 ASSESSMENT — PATIENT HEALTH QUESTIONNAIRE - PHQ9: CLINICAL INTERPRETATION OF PHQ2 SCORE: 0

## 2019-01-02 ENCOUNTER — TELEPHONE (OUTPATIENT)
Dept: MEDICAL GROUP | Facility: MEDICAL CENTER | Age: 74
End: 2019-01-02

## 2019-01-02 NOTE — TELEPHONE ENCOUNTER
1. Caller Name: Lakisha       Call Back Number: [phone] (home)       2. Message: patient would like a referral to be seen for her issue of not gaining weight. She wasn't sure what doctor you would want her to see     3. Patient approves office to leave a detailed voicemail/MyChart message: N\A

## 2019-01-03 NOTE — TELEPHONE ENCOUNTER
The first step would be for her to keep a diary as to what she is eating so we can add up how many calories a day she is getting in.  If it turns out she is getting in more than 1800 consuelo then she will need an endocrinologist.  If it is less she will need a dietitian.

## 2019-01-07 NOTE — PROGRESS NOTES
Outcome: Left Message    Please transfer to Patient Outreach Team at [phone] when patient returns call.        Attempt # 2

## 2019-01-22 NOTE — PROGRESS NOTES
Attempt #: Final  Verify PCP: yes    Communication Preference Obtained: yes    Annual Wellness Visit Scheduling  1. Scheduling Status:Not Scheduled. Patient states they are not interested     Care Gap Scheduling (Attempt to Schedule EACH Overdue Care Gap!) / Pt stated that she will call at later time to schedule care gaps.  Health Maintenance Due   Topic Date Due   • Annual Wellness Visit  11/10/2016   • BONE DENSITY  09/20/2018       MyChart Activation: declined

## 2019-03-20 DIAGNOSIS — I10 ESSENTIAL HYPERTENSION: ICD-10-CM

## 2019-03-20 RX ORDER — DILTIAZEM HYDROCHLORIDE 180 MG/1
CAPSULE, EXTENDED RELEASE ORAL
Qty: 90 CAP | Refills: 3 | Status: SHIPPED | OUTPATIENT
Start: 2019-03-20 | End: 2019-01-01

## 2019-03-21 DIAGNOSIS — I10 ESSENTIAL HYPERTENSION: ICD-10-CM

## 2019-03-21 RX ORDER — LISINOPRIL 40 MG/1
40 TABLET ORAL DAILY
Qty: 90 TAB | Refills: 3 | Status: SHIPPED | OUTPATIENT
Start: 2019-03-21 | End: 2019-04-12 | Stop reason: SDUPTHER

## 2019-03-21 NOTE — TELEPHONE ENCOUNTER
Was the patient seen in the last year in this department? Yes    Does patient have an active prescription for medications requested? Yes    Received Request Via: Pharmacy     Pharmacy is requesting a 100 day supply for patient's Lisinopril

## 2019-03-22 ENCOUNTER — HOSPITAL ENCOUNTER (OUTPATIENT)
Dept: LAB | Facility: MEDICAL CENTER | Age: 74
End: 2019-03-22
Attending: FAMILY MEDICINE
Payer: MEDICARE

## 2019-03-22 DIAGNOSIS — M05.79 RHEUMATOID ARTHRITIS INVOLVING MULTIPLE SITES WITH POSITIVE RHEUMATOID FACTOR (HCC): ICD-10-CM

## 2019-03-22 LAB
ALBUMIN SERPL BCP-MCNC: 4.2 G/DL (ref 3.2–4.9)
ALBUMIN/GLOB SERPL: 1.9 G/DL
ALP SERPL-CCNC: 51 U/L (ref 30–99)
ALT SERPL-CCNC: 8 U/L (ref 2–50)
ANION GAP SERPL CALC-SCNC: 7 MMOL/L (ref 0–11.9)
ANISOCYTOSIS BLD QL SMEAR: ABNORMAL
AST SERPL-CCNC: 13 U/L (ref 12–45)
BASOPHILS # BLD AUTO: 0.9 % (ref 0–1.8)
BASOPHILS # BLD: 0.06 K/UL (ref 0–0.12)
BILIRUB SERPL-MCNC: 0.4 MG/DL (ref 0.1–1.5)
BUN SERPL-MCNC: 20 MG/DL (ref 8–22)
BURR CELLS BLD QL SMEAR: NORMAL
CALCIUM SERPL-MCNC: 9.3 MG/DL (ref 8.5–10.5)
CHLORIDE SERPL-SCNC: 107 MMOL/L (ref 96–112)
CO2 SERPL-SCNC: 27 MMOL/L (ref 20–33)
COMMENT 1642: NORMAL
CREAT SERPL-MCNC: 0.62 MG/DL (ref 0.5–1.4)
EOSINOPHIL # BLD AUTO: 0.04 K/UL (ref 0–0.51)
EOSINOPHIL NFR BLD: 0.6 % (ref 0–6.9)
ERYTHROCYTE [DISTWIDTH] IN BLOOD BY AUTOMATED COUNT: 50.9 FL (ref 35.9–50)
GLOBULIN SER CALC-MCNC: 2.2 G/DL (ref 1.9–3.5)
GLUCOSE SERPL-MCNC: 102 MG/DL (ref 65–99)
HCT VFR BLD AUTO: 35.8 % (ref 37–47)
HGB BLD-MCNC: 9.7 G/DL (ref 12–16)
HYPOCHROMIA BLD QL SMEAR: ABNORMAL
IMM GRANULOCYTES # BLD AUTO: 0.03 K/UL (ref 0–0.11)
IMM GRANULOCYTES NFR BLD AUTO: 0.5 % (ref 0–0.9)
LYMPHOCYTES # BLD AUTO: 1.46 K/UL (ref 1–4.8)
LYMPHOCYTES NFR BLD: 22.3 % (ref 22–41)
MCH RBC QN AUTO: 22.5 PG (ref 27–33)
MCHC RBC AUTO-ENTMCNC: 27.1 G/DL (ref 33.6–35)
MCV RBC AUTO: 82.9 FL (ref 81.4–97.8)
MICROCYTES BLD QL SMEAR: ABNORMAL
MONOCYTES # BLD AUTO: 0.69 K/UL (ref 0–0.85)
MONOCYTES NFR BLD AUTO: 10.5 % (ref 0–13.4)
MORPHOLOGY BLD-IMP: NORMAL
NEUTROPHILS # BLD AUTO: 4.28 K/UL (ref 2–7.15)
NEUTROPHILS NFR BLD: 65.2 % (ref 44–72)
NRBC # BLD AUTO: 0 K/UL
NRBC BLD-RTO: 0 /100 WBC
OVALOCYTES BLD QL SMEAR: NORMAL
PLATELET # BLD AUTO: 291 K/UL (ref 164–446)
PLATELET BLD QL SMEAR: NORMAL
PMV BLD AUTO: 10.4 FL (ref 9–12.9)
POIKILOCYTOSIS BLD QL SMEAR: NORMAL
POTASSIUM SERPL-SCNC: 4.9 MMOL/L (ref 3.6–5.5)
PROT SERPL-MCNC: 6.4 G/DL (ref 6–8.2)
RBC # BLD AUTO: 4.32 M/UL (ref 4.2–5.4)
RBC BLD AUTO: PRESENT
SCHISTOCYTES BLD QL SMEAR: NORMAL
SODIUM SERPL-SCNC: 141 MMOL/L (ref 135–145)
WBC # BLD AUTO: 6.6 K/UL (ref 4.8–10.8)

## 2019-03-22 PROCEDURE — 85025 COMPLETE CBC W/AUTO DIFF WBC: CPT

## 2019-03-22 PROCEDURE — 80053 COMPREHEN METABOLIC PANEL: CPT

## 2019-03-22 PROCEDURE — 36415 COLL VENOUS BLD VENIPUNCTURE: CPT

## 2019-03-28 ENCOUNTER — OFFICE VISIT (OUTPATIENT)
Dept: MEDICAL GROUP | Facility: MEDICAL CENTER | Age: 74
End: 2019-03-28
Payer: MEDICARE

## 2019-03-28 VITALS
DIASTOLIC BLOOD PRESSURE: 68 MMHG | OXYGEN SATURATION: 96 % | WEIGHT: 83 LBS | BODY MASS INDEX: 14.17 KG/M2 | TEMPERATURE: 98 F | SYSTOLIC BLOOD PRESSURE: 118 MMHG | HEART RATE: 73 BPM | HEIGHT: 64 IN | RESPIRATION RATE: 12 BRPM

## 2019-03-28 DIAGNOSIS — G89.29 CHRONIC MIDLINE THORACIC BACK PAIN: ICD-10-CM

## 2019-03-28 DIAGNOSIS — Z79.891 CHRONIC USE OF OPIATE FOR THERAPEUTIC PURPOSE: ICD-10-CM

## 2019-03-28 DIAGNOSIS — M54.89 VERTEBROGENIC PAIN SYNDROME: ICD-10-CM

## 2019-03-28 DIAGNOSIS — G47.09 OTHER INSOMNIA: ICD-10-CM

## 2019-03-28 DIAGNOSIS — E53.8 B12 NUTRITIONAL DEFICIENCY: ICD-10-CM

## 2019-03-28 DIAGNOSIS — M05.79 RHEUMATOID ARTHRITIS INVOLVING MULTIPLE SITES WITH POSITIVE RHEUMATOID FACTOR (HCC): ICD-10-CM

## 2019-03-28 DIAGNOSIS — I70.202 FEMORAL ARTERY STENOSIS, LEFT (HCC): ICD-10-CM

## 2019-03-28 DIAGNOSIS — F33.42 RECURRENT MAJOR DEPRESSIVE DISORDER, IN FULL REMISSION (HCC): ICD-10-CM

## 2019-03-28 DIAGNOSIS — M54.6 CHRONIC MIDLINE THORACIC BACK PAIN: ICD-10-CM

## 2019-03-28 DIAGNOSIS — D64.9 ANEMIA, UNSPECIFIED TYPE: ICD-10-CM

## 2019-03-28 PROBLEM — E46 PROTEIN CALORIE MALNUTRITION (HCC): Status: RESOLVED | Noted: 2017-07-03 | Resolved: 2019-03-28

## 2019-03-28 PROCEDURE — 99214 OFFICE O/P EST MOD 30 MIN: CPT | Performed by: FAMILY MEDICINE

## 2019-03-28 RX ORDER — HYDROCODONE BITARTRATE AND ACETAMINOPHEN 10; 325 MG/1; MG/1
1 TABLET ORAL EVERY 4 HOURS PRN
Qty: 180 TAB | Refills: 0 | Status: SHIPPED | OUTPATIENT
Start: 2019-01-01 | End: 2019-01-01 | Stop reason: SDUPTHER

## 2019-03-28 RX ORDER — HYDROCODONE BITARTRATE AND ACETAMINOPHEN 10; 325 MG/1; MG/1
1 TABLET ORAL EVERY 4 HOURS PRN
Qty: 180 TAB | Refills: 0 | Status: SHIPPED | OUTPATIENT
Start: 2019-05-13 | End: 2019-03-28 | Stop reason: SDUPTHER

## 2019-03-28 RX ORDER — TRAZODONE HYDROCHLORIDE 50 MG/1
50 TABLET ORAL
Qty: 90 TAB | Refills: 2 | Status: SHIPPED | OUTPATIENT
Start: 2019-03-28 | End: 2019-01-01

## 2019-03-28 RX ORDER — CYANOCOBALAMIN 1000 UG/ML
1000 INJECTION, SOLUTION INTRAMUSCULAR; SUBCUTANEOUS ONCE
Status: COMPLETED | OUTPATIENT
Start: 2019-03-28 | End: 2019-03-28

## 2019-03-28 RX ORDER — HYDROCODONE BITARTRATE AND ACETAMINOPHEN 10; 325 MG/1; MG/1
1 TABLET ORAL EVERY 4 HOURS PRN
Qty: 180 TAB | Refills: 0 | Status: SHIPPED | OUTPATIENT
Start: 2019-04-13 | End: 2019-03-28 | Stop reason: SDUPTHER

## 2019-03-28 RX ADMIN — CYANOCOBALAMIN 1000 MCG: 1000 INJECTION, SOLUTION INTRAMUSCULAR; SUBCUTANEOUS at 13:27

## 2019-03-28 ASSESSMENT — PATIENT HEALTH QUESTIONNAIRE - PHQ9
5. POOR APPETITE OR OVEREATING: 1 - SEVERAL DAYS
CLINICAL INTERPRETATION OF PHQ2 SCORE: 2
SUM OF ALL RESPONSES TO PHQ QUESTIONS 1-9: 6

## 2019-03-28 NOTE — PROGRESS NOTES
Chief Complaint   Patient presents with   • Anemia   • Insomnia       Subjective:     HPI:   Lakisha Bhatti presents today with the followin. Anemia, unspecified type  She has significant anemia.  Discussed with her and her .  Patient does have history of low B12 in the past.  Patient and  feels she is eating some meat but can only tolerate a few spoonfuls at a time.  Cyanocobalamin injection of the thousand units is given here today.  Have advised the patient to begin daily low-dose iron supplementation and obtain a repeat CBC in a month.  Patient denies palpitations, chest pain or increased shortness of breath.  She is advised to get the blood tests right away if those symptoms occur or she feels there is a major change in her health.  Pulse oxygen level here today is normal and her pulse is normal.  This argues against any rapid blood loss.  She denies hemoptysis, hematemesis, dysphagia, abdominal pain or visible blood in the stool or urine.  - Cyanocobalamin    2. B12 nutritional deficiency  Injection administered today, also advised to take a B supplement and iron daily.  - Cyanocobalamin    3. Other insomnia  Patient stopped mirtazapine as she felt it was not helping her sleep and was not helping her mood or necessary for her mood anymore.  She is having trouble sleeping which she has had for a long time.  She has the most trouble in getting to sleep though she also has trouble staying asleep.  I had prescribed 100 mg trazodone in the past but she felt it made her very loopy.  She took 1 of her 's trazodone 50 mg a few days ago and managed to sleep well for about 10 hours.  She feels she had a little bit of dry mouth and significant grogginess.  Discussed with the patient trying the trazodone again but this time I will prescribe 50 mg and she will break that in half and simply take 25 mg nightly.  She is able to increase to 50 mg if necessary.    4. Vertebrogenic pain  syndrome/Rheumatoid arthritis involving multiple sites with positive rheumatoid factor (HCC)  Patient has long-standing severe rheumatoid arthritis.  She follows with rheumatology regularly.  She is on prednisone low-dose twice daily from her rheumatologist for this problem.  She has been tried on numerous disease modifying antirheumatic agents.  I believe that she has been tried on every 1 that is currently available.  She thinks that is true as well.  She was unable to tolerate many of them even though some were very helpful due to severe weight loss or other adverse effects.  Patient considered trying the celecoxib but after reading of the black box warning that warned there was increased risk of stroke she felt this was inappropriate for her.  I believe she is correct that with previous history of stroke we really cannot use this medication.    5. Chronic midline thoracic back pain  This is primarily due to her rheumatoid arthritis but patient also has history of vertebral fracture at multiple levels.  Her last bone density test showed osteopenia and was stable compared to previous testing.  Patient declines repeat bone density testing at this time though states she will consider at a later date.    6. Chronic use of opiate for therapeutic purpose  No aberrant or addictive use of medications has been observed.  No adverse events have been reported.  Patient counseled to not add Tylenol to current regimen.  Patient counseled to keep medications locked up or under personal control.  [facility] board of pharmacy interface is reviewed.  No inconsistencies are found.  Her max active is 60.  This is no longer within CDC guideline for chronic pain prescribing by primary care.  Consent forms up to date.  Her mood is much better.  The depression is stable, in remission, doing well.    7. Recurrent major depressive disorder, in full remission (HCC)  Patient's depression is currently under much better control.  She has stopped  the mirtazapine.  She and her  continue to show very good positive supportive interaction and caring.  Patient feels her depression was in large part reactive to her stroke.  She is still having problems with insomnia as discussed above.  Patient denies sad thoughts, depression or intrusive morbid thoughts.    8. Femoral artery stenosis, left (HCC)  This is per [facility] studies from 2016.   Patient did see vascular surgeon.  Patient does continue to smoke.  Patient denies claudication symptoms or rest leg pain.    Of note, patient continues to smoke and has no plans to discontinue.  She did stop the mirtazapine as discussed above but her mood and depressive symptoms are greatly improved.  She feels that these were primarily triggered by her stroke which may very well be true.  However, she does continue to smoke and she stopped the clopidogrel.  She does not want to be on a blood thinner.  Discussed that this was there to reduce the risk of stroke.  She agrees to stay on 325 mg aspirin.    Patient Active Problem List    Diagnosis Date Noted   • History of cerebrovascular accident with residual effects 01/01/2010     Priority: High   • Femoral artery stenosis, left ([facility]) 03/30/2019   • Recurrent major depressive disorder, in full remission ([facility]) 03/28/2019   • Osteopenia of right thigh 05/04/2017   • Hypokalemia 04/05/2017   • Sacral fracture, closed ([facility]) 04/04/2017   • Fall 04/03/2017   • Intractable pain 04/03/2017   • Major depressive disorder with single episode, in partial remission ([facility]) 01/06/2017   • Claudication of right lower extremity ([facility]) 12/02/2016   • Iliac artery stenosis, right ([facility])    • Rheumatoid arthritis involving multiple sites with positive rheumatoid factor (CMS-HCC) 05/21/2016   • Chronic back pain 05/21/2016   • Hypertension 05/21/2016   • Unintended weight loss 05/21/2016   • Cerebrovascular accident (CVA) with involvement of right side of body ([facility]) 05/20/2016   • 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   • Abdominal aortic atherosclerosis ([facility]) 11/10/2015   • 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   • Chronic use of opiate for therapeutic purpose 11/10/2015   • Fracture of vertebra due to osteoporosis with routine healing 08/14/2015   • Tobacco dependence 01/14/2015   • Vertebrogenic pain syndrome    • [facility] 10/10/2012   • Postmenopausal osteoporosis    • Vitamin D deficiency disease    • Dyslipidemia, goal LDL below 100    • Essential hypertension 02/04/2010   • Bilateral carotid artery stenosis    • Rheumatoid arthritis involving multiple joints ([facility])        Current medicines (including changes today)  Current Outpatient Prescriptions   Medication Sig Dispense Refill   • traZODone (DESYREL) 50 MG Tab Take 1 Tab by mouth every bedtime. 90 Tab 2   • [START ON 6/12/2019] HYDROcodone/acetaminophen (NORCO)  MG Tab Take 1 Tab by mouth every four hours as needed for Moderate Pain or Severe Pain (rheumatoid arthritis) for up to 30 days. 180 Tab 0   • lisinopril (PRINIVIL, ZESTRIL) 40 MG tablet Take 1 Tab by mouth every day. 90 Tab 3   • CARTIA  MG CAPSULE SR 24 HR TAKE ONE CAPSULE BY MOUTH ONCE DAILY 90 Cap 3   • DILTIAZem CD (CARTIA XT) 180 MG CAPSULE SR 24 HR Take 1 Cap by mouth every day. 90 Cap 3   • atorvastatin (LIPITOR) 40 MG Tab Take 1 Tab by mouth every bedtime. 90 Tab 3   • aspirin  MG Tablet Delayed Response Take 1 Tab by mouth every day. 100 Tab 3   • predniSONE (DELTASONE) 5 MG Tab Take 5 mg by mouth every day.     • folic acid (FOLVITE) 1 MG TABS Take 2 mg by mouth every day.       No current facility-administered medications for this visit.        Allergies   Allergen Reactions   • Gold Hives   • Hydrochlorothiazide [Hctz] Itching   • Imuran [Azathioprine Sodium] Vomiting   • Naproxen Itching   • Azathioprine      DOES NOT REMEMBER REACTIOON   • Hydrochlorothiazide    • Naproxen    • Relafen [Nabumetone]      Did not work   • Tape      Paper tape is ok  "      ROS: As per HPI       Objective:     Blood pressure 118/68, pulse 73, temperature 36.7 °C (98 °F), resp. rate 12, height 1.626 m (5' 4\"), weight 37.6 kg (83 lb), SpO2 96 %, not currently breastfeeding. Body mass index is 14.25 kg/m².    Physical Exam:  Constitutional: Well-developed and well-nourished. Not diaphoretic. No distress. Lucid and fluent.  Skin: Skin is warm and dry. No rash noted.  Head: Atraumatic without lesions.  Eyes: Conjunctivae and extraocular motions are normal. Pupils are equal, round, and reactive to light. No scleral icterus.   Ears:  External ears unremarkable. Tympanic membranes clear and intact.   Nose: Nares patent. Mucosa without edema or erythema. No discharge. No facial tenderness.  Mouth/Throat: Tongue normal. Oropharynx is clear and moist. Posterior pharynx without erythema or exudates.  Neck: Marked kyphosis.  Range of motion is quite good except somewhat limited in extension. No thyromegaly present. No cervical or supraclavicular lymphadenopathy. No JVD or carotid bruits appreciated  Cardiovascular: Regular rate and rhythm.  Normal S1, S2 without murmur appreciated.  Chest: Effort normal. Clear to auscultation throughout. No adventitious sounds.   Abdomen: Soft, non tender, and without distention. Active bowel sounds in all four quadrants. No rebound, guarding, masses or hepatosplenomegaly.  Extremities: No cyanosis, clubbing, erythema, nor edema.  Patient has significant joint deformities especially in her hands.  Neurological: Alert and oriented x 3.  Gait is a little slow and antalgic but generally steady.  Psychiatric:  Behavior, mood, and affect are appropriate.       Assessment and Plan:     73 y.o. female with the following issues:    1. Anemia, unspecified type  cyanocobalamin (VITAMIN B-12) injection 1,000 mcg    CBC WITH DIFFERENTIAL    IRON/TOTAL IRON BIND    VITAMIN B12    FERRITIN   2. B12 nutritional deficiency  cyanocobalamin (VITAMIN B-12) injection 1,000 mcg "    VITAMIN B12   3. Other insomnia  traZODone (DESYREL) 50 MG Tab   4. Vertebrogenic pain syndrome  HYDROcodone/acetaminophen (NORCO)  MG Tab    DISCONTINUED: HYDROcodone/acetaminophen (NORCO)  MG Tab    DISCONTINUED: HYDROcodone/acetaminophen (NORCO)  MG Tab   5. Rheumatoid arthritis involving multiple sites with positive rheumatoid factor (HCC)  HYDROcodone/acetaminophen (NORCO)  MG Tab    DISCONTINUED: HYDROcodone/acetaminophen (NORCO)  MG Tab    DISCONTINUED: HYDROcodone/acetaminophen (NORCO)  MG Tab   6. Chronic midline thoracic back pain  HYDROcodone/acetaminophen (NORCO)  MG Tab    DISCONTINUED: HYDROcodone/acetaminophen (NORCO)  MG Tab    DISCONTINUED: HYDROcodone/acetaminophen (NORCO)  MG Tab   7. Chronic use of opiate for therapeutic purpose     8. Recurrent major depressive disorder, in full remission (HCC)     9. Femoral artery stenosis, left (HCC)       Chronic pain recheck for: Chronic pain associated with autoimmune rheumatic disease.  This is primarily in the hands and back.  There are severe degenerative changes in the back that also contribute to her pain.  Last dose of controlled substance: This morning  Chronic pain treated with hydrocodone APAP taken 6/day.  This is slightly above guidelines but I feel this patient's pain is extremely well documented and she has made every reasonable effort to treat this with nonnarcotic methods.  I think it is reasonable to continue her treatment at 60 morphine milligram equivalents per day even though that is slightly above current CDC guidelines.    She  reports that she does not drink alcohol.  She  reports that she does not use drugs.    Interval history: Patient has had no new falls or fractures.  Any major change in health since last appointment? Yes Not noticed that much by the patient who felt her fatigue was just part of her disease but the significant anemia is new    Consequences of Chronic Opiate  therapy:  (5 A's)  Analgesia: Compared to no treatment or prior treatment, pain is currently improved  Activity: improved  Adverse Events:RIMMA@ denies constipation, itchy skin, nausea and sedation  Aberrant Behaviors: She reports she is taking medication as prescribed and is not veering from agreed treatment regimen or provider recommendations. There have been no inappropriate refills or lost/stolen meds reported.  Affect/Mood: Pain is impacting patient's mood.  Patient reports stable depression/anxiety.    Nonnarcotic treatments that are being used: topical agents and prednisone daily.  cannot take NSAIDS due to increased stroke risk.     Last imagin2017    Opioid Risk Score: 1    Interpretation of Opioid Risk Score   Score 0-3 = Low risk of abuse. Do UDS at least once per year.  Score 4-7 = Moderate risk of abuse. Do UDS 1-4 times per year.  Score 8+ = High risk of abuse. Refer to specialist.    Last order of CONTROLLED SUBSTANCE TREATMENT AGREEMENT was found on 2018 from Office Visit on 2018     UDS Summary                URINE DRUG SCREEN Next Due 2019      Done 7/3/2018 [facility] PAIN MANAGEMENT SCREEN     Patient has more history with this topic...        Most recent UDS reviewed today and is consistent with prescribed medications.     I have reviewed the medical records, the Prescription Monitoring Program and I have determined that controlled substance treatment is medically indicated.       Followup: Return in about 3 months (around 2019), or if symptoms worsen or fail to improve.

## 2019-03-30 PROBLEM — I70.202 FEMORAL ARTERY STENOSIS, LEFT (HCC): Status: ACTIVE | Noted: 2019-03-30

## 2019-04-12 DIAGNOSIS — I10 ESSENTIAL HYPERTENSION: ICD-10-CM

## 2019-04-12 RX ORDER — LISINOPRIL 40 MG/1
40 TABLET ORAL DAILY
Qty: 100 TAB | Refills: 3 | Status: SHIPPED | OUTPATIENT
Start: 2019-04-12 | End: 2019-01-01 | Stop reason: SDUPTHER

## 2019-05-31 ENCOUNTER — PATIENT OUTREACH (OUTPATIENT)
Dept: HEALTH INFORMATION MANAGEMENT | Facility: OTHER | Age: 74
End: 2019-05-31

## 2019-05-31 NOTE — PROGRESS NOTES
Outcome: Left Message    Please transfer to Patient Outreach Team at [phone] when patient returns call.    [facility] Verified: yes    Attempt # 1

## 2019-06-28 PROBLEM — D64.9 ANEMIA: Status: ACTIVE | Noted: 2019-01-01

## 2019-06-28 PROBLEM — R52 INTRACTABLE PAIN: Status: RESOLVED | Noted: 2017-04-03 | Resolved: 2019-01-01

## 2019-06-28 PROBLEM — E87.6 HYPOKALEMIA: Status: RESOLVED | Noted: 2017-04-05 | Resolved: 2019-01-01
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
## 2019-06-28 PROBLEM — S32.10XA SACRAL FRACTURE, CLOSED (HCC): Status: RESOLVED | Noted: 2017-04-04 | Resolved: 2019-01-01

## 2019-06-28 PROBLEM — F33.42 RECURRENT MAJOR DEPRESSIVE DISORDER, IN FULL REMISSION (HCC): Status: RESOLVED | Noted: 2019-03-28 | Resolved: 2019-01-01

## 2019-06-28 NOTE — PROGRESS NOTES
Chief Complaint   Patient presents with   • Annual Wellness Visit         HPI:  Lakisha Bhatti is a 73 y.o. here for Medicare Annual Wellness Visit     Patient Active Problem List    Diagnosis Date Noted   • History of cerebrovascular accident with residual effects 01/01/2010     Priority: High   • Anemia 06/28/2019   • Femoral artery stenosis, left ([facility]) 03/30/2019   • Osteopenia of right thigh 05/04/2017   • Major depressive disorder with single episode, in partial remission ([facility]) 01/06/2017   • Claudication of right lower extremity ([facility]) 12/02/2016   • Iliac artery stenosis, right ([facility])    • Rheumatoid arthritis involving multiple sites with positive rheumatoid factor ([facility]) 05/21/2016   • Chronic back pain 05/21/2016   • 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[facility]) 12/22/2015   • Abdominal aortic atherosclerosis ([facility]) 11/10/2015   • 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[facility]) 11/10/2015   • Chronic use of opiate for therapeutic purpose 11/10/2015   • Fracture of vertebra due to osteoporosis with routine healing 08/14/2015   • Tobacco dependence 01/14/2015   • Vertebrogenic pain syndrome    • MEDICAL HOME 10/10/2012   • Postmenopausal osteoporosis    • Vitamin D deficiency disease    • Dyslipidemia, goal LDL below 100    • Essential hypertension 02/04/2010   • Bilateral carotid artery stenosis    • Rheumatoid arthritis involving multiple joints ([facility])        Current Outpatient Prescriptions   Medication Sig Dispense Refill   • [START ON 9/10/2019] HYDROcodone/acetaminophen (NORCO)  MG Tab Take 1 Tab by mouth every four hours as needed for Moderate Pain or Severe Pain (rheumatoid arthritis) for up to 30 days. 180 Tab 0   • lisinopril (PRINIVIL, ZESTRIL) 40 MG tablet Take 1 Tab by mouth every day. 100 Tab 3   • traZODone (DESYREL) 50 MG Tab Take 1 Tab by mouth every bedtime. 90 Tab 2   • CARTIA  MG CAPSULE SR 24 HR TAKE ONE CAPSULE BY MOUTH ONCE DAILY 90 Cap 3   • atorvastatin (LIPITOR) 40 MG Tab Take 1 Tab by mouth every  bedtime. 90 Tab 3   • aspirin  MG Tablet Delayed Response Take 1 Tab by mouth every day. 100 Tab 3   • predniSONE (DELTASONE) 5 MG Tab Take 5 mg by mouth every day.     • folic acid (FOLVITE) 1 MG TABS Take 2 mg by mouth every day.     • DILTIAZem CD (CARTIA XT) 180 MG CAPSULE SR 24 HR Take 1 Cap by mouth every day. 90 Cap 3     No current facility-administered medications for this visit.             Current supplements as per medication list.       Allergies: Gold; Hydrochlorothiazide [hctz]; Imuran [azathioprine sodium]; Naproxen; Azathioprine; Hydrochlorothiazide; Naproxen; Relafen [nabumetone]; and Tape    Current social contact/activities: discussed, limited, has been cold and tired.  Did not do her lab tests.     She  reports that she has been smoking Cigarettes.  She has a 52.00 pack-year smoking history. She has never used smokeless tobacco. She reports that she does not drink alcohol or use drugs.  Ready to quit: No  Counseling given: Yes      DPA/Advanced Directive:  Patient does not have an Advanced Directive.  A packet and workshop information was given on Advanced Directives.    ROS:    Gait: leans on her  to walk  Ostomy: No  Other tubes: No  Amputations: No  Chronic oxygen use: No  Last eye exam: has been quite a while, discussed, recommend seeing someone soon  Wears hearing aids: No   : Denies any urinary leakage during the last 6 months    Screening:  Declines    Depression Screening    Little interest or pleasure in doing things?   1  Feeling down, depressed , or hopeless?  2  Trouble falling or staying asleep, or sleeping too much?   3  Feeling tired or having little energy?   3  Poor appetite or overeating?   3  Feeling bad about yourself - or that you are a failure or have let yourself or your family down?  0  Trouble concentrating on things, such as reading the newspaper or watching television?  0  Moving or speaking so slowly that other people could have noticed.  Or the  opposite - being so fidgety or restless that you have been moving around a lot more than usual?   0  Thoughts that you would be better off dead, or of hurting yourself?   0  Patient Health Questionnaire Score: 6  Actually 9    If depressive symptoms identified deferred to follow up visit unless specifically addressed in assessment and plan.    Interpretation of PHQ-9 Total Score   Score Severity   1-4 No Depression   5-9 Mild Depression   10-14 Moderate Depression   15-19 Moderately Severe Depression   20-27 Severe Depression      Screening for Cognitive Impairment    Three Minute Recall (village, kitchen, baby)  declines/3    Jaziel clock face with all 12 numbers and set the hands to show 10 past 10.   declines    Cognitive concerns identified deferred for follow up unless specifically addressed in assessment and plan.    Fall Risk Assessment    Has the patient had two or more falls in the last year or any fall with injury in the last year?  No    Safety Assessment    Throw rugs on floor.   yes, have backing  Handrails on all stairs.   no stairs  Good lighting in all hallways.   yes  Difficulty hearing.   no  Patient counseled about all safety risks that were identified.    Functional Assessment ADLs    Are there any barriers preventing you from cooking for yourself or meeting nutritional needs?    yes, has assistance from .    Are there any barriers preventing you from driving safely or obtaining transportation?   yes,  drives.    Are there any barriers preventing you from using a telephone or calling for help?   .no    Are there any barriers preventing you from shopping?   yes, cannot walk around store.    Are there any barriers preventing you from taking care of your own finances?   .no    Are there any barriers preventing you from managing your medications?    no    Are there any barriers preventing you from showering, bathing or dressing yourself?  .no, some assistance from  as needed    Are  you currently engaging in any exercise or physical activity?   .no     What is your perception of your health?   .she thinks it is good      Health Maintenance Summary                Annual Wellness Visit Overdue 11/10/2016      Done 11/10/2015 Visit Dx: Medicare annual wellness visit, subsequent     Patient has more history with this topic...    URINE DRUG SCREEN Overdue 6/28/2019      [facility]     Patient has more history with this topic...    IMM ZOSTER VACCINES Postponed 6/1/2020 Originally 10/21/1997. System: vaccine not available, other system reasons    HEPATITIS C SCREENING Postponed 6/1/2020 Originally 1945. Patient Refused    IMM DTaP/Tdap/Td Vaccine Next Due 10/8/2023      Done 10/8/2013 Imm Admin: Tdap Vaccine          Patient Care Team:  Kamlesh Padilla M.D. as PCP - General (Family Medicine)  Juan Goodwin M.D. as Consulting Physician (Rheumatology)  Delphine Edmonds R.N. as Medical Home Care Manager  Leroy Tran M.D. as Consulting Physician (Ophthalmology)  Hamlet Velazquez M.D. as Consulting Physician (Gastroenterology)  Jeremy Jade M.D. as Consulting Physician (Pain Management)  Kamlesh Padilla M.D.  Angela Garza, [facility] as        Social History   Substance Use Topics   • Smoking status: Current Every Day Smoker     Packs/day: 1.00     Years: 52.00     Types: Cigarettes   • Smokeless tobacco: Never Used      Comment: she is working on reducing and weaning off, discussed   • Alcohol use No      Comment: patient feels not ready to quit     Family History   Problem Relation Age of Onset   • Non-contributory Mother         emphysema   • Hypertension Mother    • Hyperlipidemia Mother    • Stroke Father         brain aneurysm   • Lung Disease Father         chronic bronchitis   • Heart Disease Father         MI, heart aneurysm     She  has a past medical history of Acute ischemic stroke  ([facility]) (1/2010); Anesthesia; ATHEROSCLEROSIS; Bleeding disorder ([facility]); Blood clotting disorder ([facility]); Breath shortness; Carotid artery stenosis; CATARACT; Dental disorder; Dyslipidemia, goal LDL below 100; Emphysema of lung ([facility]); Fibromyalgia; High cholesterol; History of cerebrovascular accident with residual effects (1/2010); Hypertension (2015); Iliac artery stenosis, right ([facility]); Indigestion; MEDICAL HOME (10/10/2012); Osteoarthritis; OSTEOPOROSIS; Pain; RA (rheumatoid arthritis) ([facility]); Rheumatoid arthritis(714.0) (1997); Stroke ([facility]) (2010); Stroke ([facility]) (2016); TIA (transient ischemic attack); Tubular adenoma of colon; Vasculitis of the skin; Vertebral fracture, osteoporotic ([facility]); and Vitamin d deficiency.   Past Surgical History:   Procedure Laterality Date   • GASTROSCOPY-ENDO N/A 1/18/2016    Procedure: GASTROSCOPY-ENDO;  Surgeon: Pro Moreira M.D.;  Location: [facility];  Service:    • COLONOSCOPY - ENDO N/A 1/18/2016    Procedure: COLONOSCOPY - ENDO;  Surgeon: Pro Moreira M.D.;  Location: [facility];  Service:    • GASTROSCOPY WITH BIOPSY N/A 1/18/2016    Procedure: GASTROSCOPY WITH BIOPSY;  Surgeon: Pro Moreira M.D.;  Location: [facility];  Service:    • THROMBECTOMY Left 12/22/2015    Procedure: LEFT UPPER EXTREMITY BRACHIAL  THROMBECTOMY;  Surgeon: Presley Allison M.D.;  Location: [facility];  Service:    • RECOVERY  12/10/2015    Procedure: VASCULAR CASEJESSICA-ABDOMINAL AORTOGRAM WITH RUNOFF/VISCERAL ANGIOGRAM 13254, 69655, 48935, 46708- ABDOMINAL PAIN POSSIBLE MESENTERIC ARTERY ISCHEMIA, ATHEROSCLEROSIS OF AORTA I70.0, R10.9;  Surgeon: Recoveryonly Surgery;  Location: SURGERY PRE-POST PROC UNIT [facility];  Service:    • CYSTOCELE REPAIR  1/27/2012    Performed by PADMINI RAM at [facility]   • RECTOCELE REPAIR  1/27/2012    Performed by PADMINI RAM at [facility]   • CAROTID ENDARTERECTOMY  2/18/2010     "left   • CATARACT PHACO WITH IOL      b/l   • CHOLECYSTECTOMY     • INCISION HERNIA REPAIR      with bladder repair   • VAGINAL HYSTERECTOMY TOTAL      Hysterectomy,Total Vaginal       Exam:   /76 (BP Location: Right arm, Patient Position: Sitting)   Pulse 74   Temp 37 °C (98.6 °F) (Temporal)   Resp 14   Ht 1.626 m (5' 4\")   Wt 39 kg (86 lb)   SpO2 94%  Body mass index is 14.76 kg/m².    Hearing good.    Dentition good  Alert, oriented in no acute distress.  Eye contact is good, speech goal directed, affect calm  HEENT:   EOMI, PERRLA.   Oropharynx is well hydrated with normal soft palate motion. No exudate or ulcerations noted.   Neck:       ROM limited in extension, cervical kyphosis, mild posterior cervical spasm. No JVD or carotid bruits appreciated. No cervical adenopathy appreciated. No thyromegaly or neck masses appreciated.  No retractions appreciated.  Lungs:     Clear to auscultation A&P with good air movement.   Heart:      Regular rate and rhythm normal S1 and S2 without murmur appreciated.  Strong and symmetric radial and DP pulses.  Abd:        Soft, bowel sounds positive, nontender. No bloating noted. No hepatosplenomegaly or mass appreciated. No pulsatile mass appreciated.  Ext:         Extremities show symmetric and full range of motion with normal strength. No cyanosis or clubbing appreciated. No peripheral edema appreciated.  Back marked kyphosis and distortion of spine.  Tender spinous processes throughout.  Neuro:    Gait is normal. Patient is lucid, fluent and appropriate. No significant tremor appreciated.  Skin:       Exhibit no rashes, pigmented lesions or ulcerations.    Results from March reviewed and discussed again    Assessment and Plan. The following treatment and monitoring plan is recommended:      1. Medicare annual wellness visit, subsequent  Her multiple serious medical problems are generally stable    2. Vertebrogenic pain syndrome/Chronic midline thoracic back " pain  She has degenerative problems throughout the spine but also autoimmune arthritic change and osteoporotic change.  This causes severe pain.  We have tried to reduce the hydrocodone down to guidelines but have not been able to fully reach it.  She does have severe kyphosis and some acquired scoliosis as well.  She has been stable and somewhat functional on this regimen.  Her  assists her but she does take care of at least part of her ADLs.  Treatment with rheumatology in the past was unfortunately not successful as agents that did help also caused very severe unremitting weight loss.  Unfortunately this is a stable problem.    3. Rheumatoid arthritis involving multiple sites with positive rheumatoid factor (HCC)/Rheumatoid arthritis involving multiple joints (HCC)  Patient has long-standing well-documented rheumatoid arthritis.  Numerous disease modifying antirheumatic agents have been tried including several of the newer injectables.  Unfortunately the patient was not able to tolerate any of them even though if you did help but caused unremitting severe weight loss.  Unfortunately is stable problem and a source of chronic pain.  She is not a candidate for further rheumatologic intervention.  She is not a candidate for injections in the spine as she is on chronic steroids and it is at very high risk of further osteoporotic fracture and of infection.  Stable.    4. Chronic use of opiate for therapeutic purpose  No aberrant or addictive use of medications has been observed.  No adverse events have been reported.  Patient counseled to not add Tylenol to current regimen.  Patient counseled to keep medications locked up or under personal control.  [facility] board of pharmacy interface is reviewed.  No inconsistencies are found.  The patient's maximum MME is 60.  This is no longer within CDC guideline for chronic pain prescribing by primary care.  However, pain management has wanted to do multiple injections in the  past.  None of them were very helpful to the patient at all and I believe that her current condition would convey increased risk.  I am choosing to keep her at this level as she has been clinically stable and responsible with the medication.  No adverse events have been observed or reported.  UDS is obtained again today  - Pain Management Screen; Future    5. Essential hypertension  HTN - Chronic condition stable. Currently taking all meds as directed.   She is taking 325 mg aspirin daily.   She is not monitoring BP at home.   Denies symptoms low BP: light-headed, tunnel-vision, unusual fatigue.   Denies symptoms high BP:pounding headache, visual changes, palpitations, flushed face.   Denies medicine side effects: unusual fatigue, slow heartbeat, foot/leg swelling, cough.    8. History of cerebrovascular accident with residual effects  Patient does have history of stroke.  She is continuing to take her aspirin on a daily basis.  Her blood pressure has been under much better control overall.  Denies any new areas of weakness or change in speech or vision.   confirms that he has observed no change.  He is very attentive.  Stable.    9. Dyslipidemia, goal LDL below 100  Patient denies chest pain, chest pressure, palpitations or exertional shortness of breath. Patient denies muscle aches or muscle weakness from the atorvastatin medication. Patient is a current daily smoker. Patient takes 325 mg aspirin daily. Patient has no history of myocardial infarction, she does have history of stroke and severe PVD.  The problem is clinically stable.    10. Bilateral carotid artery stenosis  Patient has carotid stenosis bilaterally with history of TIA and stroke.  She is status post carotid endarterectomy.  She no longer wishes to see vascular surgery and follow-up as long as she is asymptomatic.  She does continue to smoke.  She voices understanding that she is at increased risk because of this.  Stable clinically  currently.    11. Abdominal aortic atherosclerosis (HCC)/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HCC)  Patient does have history of atherosclerosis without aneurysm.  The artery is tortuous.  Continues to smoke.  Stable.    13. 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HCC)  History of brachial artery thrombus, now resolved.  Stable.    14. Iliac artery stenosis, right (HCC)/Claudication of right lower extremity (HCC)/Femoral artery stenosis, left (HCC)  Patient has history of iliac artery stenosis.  She underwent a vascular procedure concerning this in 2015.  Patient denies any further pain or claudication of the right lower extremity.  However, she walks very little.  Stable.    17. Tobacco dependence  She continues to be tobacco dependent and is pre-contemplative.  Stable.    18. Major depressive disorder with single episode, in partial remission (HCC)  Here for: depression.    Current symptoms include: insomnia, difficulty concentrating,  anhedonia, fatigue and patient currently denies depression or depressive thoughts.  She also denies thoughts of self-harm.  Since last OV, symptoms are better  She is not taking medicine.  She is sleeping poorly.  She is urged to start the trazodone again.  Reminded that it is a smaller dose than the dose she had problems with.  Mood currently does affect: relationships, social activities.  Denies agoraphobia, panic attacks, SI/HI. (Denies wishing to be dead, thinking about death, intent to commit suicide, previous suicide attempt)     Review Of Systems  Taking supplements to help mood or symptoms: no  Using alcohol or other substances to help mood or symptoms: no  No polydipsia, polyuria, temperature intolerance, bowel changes  No visual or auditory hallucinations. Denies symptoms of jose: grandiosity, euphoria, need for little or no sleep, rapid pressured speech, spending sprees, reckless or risky behavior, hypersexual behavior.   Pertinent  ROS findings as above. All other systems reviewed and are  negative.    19. Anemia, unspecified type  Patient was found to have severe anemia in March.  Testing was done because she was so fatigued.  Further testing has been ordered to determine if this is perhaps an iron deficiency anemia or what type it is.  Patient has not yet followed through.  She continues to be fatigued.  She continues to be short of breath with unusual exertion.  Stable symptoms but not a very good situation.    20. Osteopenia of right thigh/Fracture of vertebra due to osteoporosis with routine healing/Postmenopausal osteoporosis  Patient does have osteoporosis with history of pathologic fracture.  Patient is not a good candidate for oral treatment due to chronic prednisone use.  Patient declines other treatment at this time.  Unfortunately stable.  Patient declines further bone density testing as well    23. Vitamin D deficiency disease  Patient is reminded of need to continue with vitamin D supplementation and calcium.  No further pathologic fractures recently.  Stable.    Chronic pain recheck for: Chronic severe pain of multiple joints due to her rheumatoid arthritis but also particularly the spine where there is degenerative change and osteoporotic change along with the inflammatory arthritis.  Last dose of controlled substance: This morning, does not drive  Chronic pain treated with hydrocodone APAP taken 5 to 6 times a day    She  reports that she does not drink alcohol.  She  reports that she does not use drugs.    Interval history: Patient has been having more fatigue and more pain.  She has not followed through with the testing of the anemia.  Discussed with her and her  again why believe it is very important that we do this.  She voices acknowledgment.  Any major change in health since last appointment? Yes More tired, as discussed above    Consequences of Chronic Opiate therapy:  (5 A's)  Analgesia: Compared to no treatment or prior treatment, pain is currently improved  Activity:  improved  Adverse Events: She denies constipation, itchy skin, nausea and sedation  Aberrant Behaviors: She reports she is taking medication as prescribed and is not veering from agreed treatment regimen or provider recommendations. There have been no inappropriate refills or lost/stolen meds reported.  Affect/Mood: Pain is impacting patient's mood.  Patient reports stable depression/anxiety.    Nonnarcotic treatments that are being used: heat and Aspirin prednisone heat and over-the-counter topical preparations.     Last imagin    Opioid Risk Score: 1    Interpretation of Opioid Risk Score   Score 0-3 = Low risk of abuse. Do UDS at least once per year.  Score 4-7 = Moderate risk of abuse. Do UDS 1-4 times per year.  Score 8+ = High risk of abuse. Refer to specialist.    Last order of CONTROLLED SUBSTANCE TREATMENT AGREEMENT was found on 2018 from Office Visit on 2018     UDS Summary                URINE DRUG SCREEN Overdue 2019      Done 7/3/2018 [facility] PAIN MANAGEMENT SCREEN     Patient has more history with this topic...        Most recent UDS reviewed today and is consistent with prescribed medications.  UDS obtained today.    I have reviewed the medical records, the Prescription Monitoring Program and I have determined that controlled substance treatment is medically indicated.       Services suggested: No services needed at this time  Health Care Screening: Age-appropriate preventive services recommended by USPTF and ACIP covered by Medicare were discussed today. Services ordered if indicated and agreed upon by the patient.  Referrals offered: Community-based lifestyle interventions to reduce health risks and promote self-management and wellness, fall prevention, nutrition, physical activity, tobacco-use cessation, weight loss, and mental health services as per orders if indicated.    Discussion today about general wellness and lifestyle habits:  discussed  · Prevent falls and  reduce trip hazards; Cautioned about securing or removing rugs.  · Have a working fire alarm and carbon monoxide detector; has  · Engage in regular physical activity and social activities     Follow-up: Return in about 3 months (around 9/28/2019), or if symptoms worsen or fail to improve.

## 2019-06-28 NOTE — LETTER
June 28, 2019         Lakisha Bhatti  [address] [place] [address]        Dear Lakisha:      Below are the results from your recent visit:    Resulted Orders   CBC WITH DIFFERENTIAL   Result Value Ref Range    WBC 7.4 4.8 - 10.8 K/uL    RBC 4.85 4.20 - 5.40 M/uL    Hemoglobin 9.6 (L) 12.0 - 16.0 g/dL    Hematocrit 36.0 (L) 37.0 - 47.0 %    MCV 74.2 (L) 81.4 - 97.8 fL    MCH 19.8 (L) 27.0 - 33.0 pg    MCHC 26.7 (L) 33.6 - 35.0 g/dL    RDW 51.9 (H) 35.9 - 50.0 fL    Platelet Count 288 164 - 446 K/uL    MPV 9.4 9.0 - 12.9 fL    Neutrophils-Polys 87.40 (H) 44.00 - 72.00 %    Lymphocytes 7.70 (L) 22.00 - 41.00 %    Monocytes 3.10 0.00 - 13.40 %    Eosinophils 0.50 0.00 - 6.90 %    Basophils 0.80 0.00 - 1.80 %    Immature Granulocytes 0.50 0.00 - 0.90 %    Nucleated RBC 0.00 /100 WBC    Neutrophils (Absolute) 6.45 2.00 - 7.15 K/uL      Comment:      Includes immature neutrophils, if present.    Lymphs (Absolute) 0.57 (L) 1.00 - 4.80 K/uL    Monos (Absolute) 0.23 0.00 - 0.85 K/uL    Eos (Absolute) 0.04 0.00 - 0.51 K/uL    Baso (Absolute) 0.06 0.00 - 0.12 K/uL    Immature Granulocytes (abs) 0.04 0.00 - 0.11 K/uL    NRBC (Absolute) 0.00 K/uL    Hypochromia 2+     Anisocytosis 2+     Microcytosis 2+    IRON/TOTAL IRON BIND   Result Value Ref Range    Iron 15 (L) 40 - 170 ug/dL    Total Iron Binding 507 (H) 250 - 450 ug/dL    % Saturation 3 (L) 15 - 55 %   VITAMIN B12   Result Value Ref Range    Vitamin B12 -True Cobalamin 409 211 - 911 pg/mL   FERRITIN   Result Value Ref Range    Ferritin 6.4 (L) 10.0 - 291.0 ng/mL   PERIPHERAL SMEAR REVIEW   Result Value Ref Range    Peripheral Smear Review see below       Comment:      Due to instrument suspect flags, further review of peripheral smear is  indicated on this patient sample. This review may or may not result in  abnormal findings.     PLATELET ESTIMATE   Result Value Ref Range    Plt Estimation Normal    MORPHOLOGY   Result Value Ref Range    RBC Morphology Present     Large Platelets 1+     Poikilocytosis 2+     Ovalocytes 1+     Schistocytes 1+     Target Cells 1+    DIFFERENTIAL COMMENT   Result Value Ref Range    Comments-Diff see below       Comment:      Results have been verified by peripheral blood smear review.     The test results show that you are still quite anemic.  Your iron levels are very low.  Your B12 is good.  The anemia is relatively stable, not significantly worse than in March.  However, it is still quite severe.  I recommend oral iron supplementation every day.  If this causes too much constipation please let us know and we will arrange an iron infusion.  I think that taking iron less than daily will probably not.correct your anemia    If you have any questions or concerns, please don't hesitate to call    Sincerely,      Kamlesh Padilla M.D.    Electronically Signed

## 2019-07-11 NOTE — TELEPHONE ENCOUNTER
Iron deficiency anemia noted on most recent labs. Would recommend an appointment with primary care within the next month to discuss additional work up to identify source/cause. In the mean time she needs to be taking iron replacement. 325mg ferrous sulfate up to three times daily as tolerated. Take with vitamin C or acidic food/drink to help with absorption. Can cause dark stool, nausea and constipation. Recommend stool study to rule out GI bleed. Additional work up at the discretion of PCP.

## 2019-07-13 NOTE — PROGRESS NOTES
Filled out form for [facility].  I thought they already had one on file.  However I have filled this out again concerning the hydrocodone.

## 2019-09-24 NOTE — PROGRESS NOTES
Chief Complaint   Patient presents with   • Rheumatoid Arthritis   • Back Pain       Subjective:     HPI:   Lakisha Bhatti presents today with the followin. Vertebrogenic pain syndrome  Patient has degenerative changes of the spine that are partially osteoporotic, osteoarthritic and rheumatoid.  This is a source of chronic pain.  She has worked with rheumatology to try to find a disease modifying medication that is effective and tolerable for her and they have not been able to do so.  She is unfortunately allergic to nonsteroidal anti-inflammatory drugs.  She also has a history of stroke and must avoid them for that reason as well.    2. Rheumatoid arthritis involving multiple sites with positive rheumatoid factor (HCC)  Patient has significant pain particularly in her hands and spine from the rheumatoid arthritis.  However, she is having pain everywhere.  Unfortunately due to her need to conform with CDC guidelines I am having to further reduce her pain medication.  We discussed various alternatives today but we could not come up with an acceptable one other than the addition of 500 mg Tylenol 2-3 times a day at most.  She still has significant Tylenol from the pain medication and that needs to be taken into consideration.  She will be seeing Dr. Goodwin in the next month or 2 and will discuss the situation with him and see if he has any further suggestions.    3. Chronic midline thoracic back pain  She has very severe kyphosis and history of multiple vertebral wedge fractures.  She has no current acute pain but has chronic pain.  There is a rheumatoid element to this as well.  She also has significant osteoarthritis throughout.  She cannot take nonsteroidal anti-inflammatory drugs due to allergy and also due to history of embolic stroke.    4. Chronic use of opiate for therapeutic purpose  No aberrant or addictive use of medications has been observed.  No adverse events have been reported.  Patient  counseled to not add Tylenol to current regimen.  Patient counseled to keep medications locked up or under personal control.  [facility] board of pharmacy interface is reviewed.  No inconsistencies are found.  The patient's maximum MME is 60.  This is no longer within CDC guideline for chronic pain prescribing by primary care.  She agrees to reduction again.  However, her pain is not in good control.        Patient Active Problem List    Diagnosis Date Noted   • History of cerebrovascular accident with residual effects 01/01/2010     Priority: High   • Iron deficiency anemia, unspecified    • Anemia 06/28/2019   • Femoral artery stenosis, left ([facility]) 03/30/2019   • Osteopenia of right thigh 05/04/2017   • Major depressive disorder with single episode, in partial remission ([facility]) 01/06/2017   • Claudication of right lower extremity ([facility]) 12/02/2016   • Iliac artery stenosis, right ([facility])    • Rheumatoid arthritis involving multiple sites with positive rheumatoid factor (CMS-HCC) 05/21/2016   • Chronic back pain 05/21/2016   • 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   • Abdominal aortic atherosclerosis ([facility]) 11/10/2015   • 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   • Chronic use of opiate for therapeutic purpose 11/10/2015   • Fracture of vertebra due to osteoporosis with routine healing 08/14/2015   • Tobacco dependence 01/14/2015   • Vertebrogenic pain syndrome    • [facility] 10/10/2012   • Postmenopausal osteoporosis    • Vitamin D deficiency disease    • Dyslipidemia, goal LDL below 100    • Essential hypertension 02/04/2010   • Bilateral carotid artery stenosis    • Rheumatoid arthritis involving multiple joints ([facility])        Current medicines (including changes today)  Current Outpatient Medications   Medication Sig Dispense Refill   • [START ON 12/8/2019] HYDROcodone-acetaminophen (NORCO) 7.5-325 MG per tablet Take 1 Tab by mouth every four hours as needed for up to 30 days. 180 Tab 0   • ferrous sulfate 325 (65  "Fe) MG tablet 1 tab po tid as tolerated. 100 Tab 2   • clopidogrel (PLAVIX) 75 MG Tab Take 1 Tab by mouth every day. 90 Tab 3   • lisinopril (PRINIVIL, ZESTRIL) 40 MG tablet Take 1 Tab by mouth every day. 100 Tab 3   • traZODone (DESYREL) 50 MG Tab Take 1 Tab by mouth every bedtime. 90 Tab 2   • CARTIA  MG CAPSULE SR 24 HR TAKE ONE CAPSULE BY MOUTH ONCE DAILY 90 Cap 3   • DILTIAZem CD (CARTIA XT) 180 MG CAPSULE SR 24 HR Take 1 Cap by mouth every day. 90 Cap 3   • atorvastatin (LIPITOR) 40 MG Tab Take 1 Tab by mouth every bedtime. 90 Tab 3   • aspirin  MG Tablet Delayed Response Take 1 Tab by mouth every day. 100 Tab 3   • predniSONE (DELTASONE) 5 MG Tab Take 5 mg by mouth every day.     • folic acid (FOLVITE) 1 MG TABS Take 2 mg by mouth every day.       No current facility-administered medications for this visit.        Allergies   Allergen Reactions   • Gold Hives   • Hydrochlorothiazide [Hctz] Itching   • Imuran [Azathioprine Sodium] Vomiting   • Naproxen Itching   • Azathioprine      DOES NOT REMEMBER REACTIOON   • Hydrochlorothiazide    • Naproxen    • Relafen [Nabumetone]      Did not work   • Tape      Paper tape is ok       ROS: As per HPI       Objective:     /82   Pulse 72   Temp 36.1 °C (97 °F)   Resp 12   Ht 1.626 m (5' 4\")   Wt 38.8 kg (85 lb 9.6 oz)   SpO2 95%  Body mass index is 14.69 kg/m².    Physical Exam:  Constitutional: Well-developed and well-nourished. Not diaphoretic. No distress. Lucid and fluent.  Skin: Skin is warm and dry. No rash noted.  Head: Atraumatic without lesions.  Eyes: Conjunctivae and extraocular motions are normal. Pupils are equal, round, and reactive to light. No scleral icterus. .  Mouth/Throat: Tongue normal. Oropharynx is clear and moist. Posterior pharynx without erythema or exudates.  Neck: Severe kyphosis throughout the entire spine. No thyromegaly present. No cervical or supraclavicular lymphadenopathy. No JVD or carotid bruits " appreciated  Cardiovascular: Regular rate and rhythm.  Normal S1, S2 without murmur appreciated.  Chest: Effort normal. Clear to auscultation throughout. No adventitious sounds.   Back: Spinous process tenderness throughout the back particularly mid and lower thoracic.  Marked degenerative changes in both hands with joint distortion.  Extremities: No cyanosis, clubbing, erythema, nor edema.   Neurological: Alert and oriented x 3.  Gait is slow, slightly unsteady.  Psychiatric:  Behavior, mood, and affect are appropriate.       Assessment and Plan:     73 y.o. female with the following issues:    1. Vertebrogenic pain syndrome  HYDROcodone-acetaminophen (NORCO) 7.5-325 MG per tablet    DISCONTINUED: HYDROcodone-acetaminophen (NORCO) 7.5-325 MG per tablet    DISCONTINUED: HYDROcodone-acetaminophen (NORCO) 7.5-325 MG per tablet   2. Rheumatoid arthritis involving multiple sites with positive rheumatoid factor (HCC)  HYDROcodone-acetaminophen (NORCO) 7.5-325 MG per tablet    DISCONTINUED: HYDROcodone-acetaminophen (NORCO) 7.5-325 MG per tablet    DISCONTINUED: HYDROcodone-acetaminophen (NORCO) 7.5-325 MG per tablet   3. Chronic midline thoracic back pain  HYDROcodone-acetaminophen (NORCO) 7.5-325 MG per tablet    DISCONTINUED: HYDROcodone-acetaminophen (NORCO) 7.5-325 MG per tablet    DISCONTINUED: HYDROcodone-acetaminophen (NORCO) 7.5-325 MG per tablet   4. Chronic use of opiate for therapeutic purpose     5. Rheumatoid arthritis involving multiple joints (HCC)  REFERRAL TO RHEUMATOLOGY     Chronic pain recheck for: Chronic arthritic pain, particularly rheumatic  Last dose of controlled substance: This morning  Chronic pain treated with hydrocodone/APAP taken 5-6 times a day.  Patient agrees to reduction in dose to conform with guidelines.  I am very sorry to have to do this as I know her pain is already poorly controlled.  Patient does not want to go to pain management at this time.    She  reports that she does not  drink alcohol.  She  reports that she does not use drugs.    Interval history: Patient's pain is worse with the Judaism of the cold weather  Any major change in health since last appointment? Yes Pain is worse.  She gets groggy from the trazodone even at 1/2 tablet dose so we will have her discontinue.    Consequences of Chronic Opiate therapy:  (5 A's)  Analgesia: Compared to no treatment or prior treatment, pain is currently improved  Activity: improved  Adverse Events: She denies constipation, itchy skin, nausea and sedation  Aberrant Behaviors: She reports she is taking medication as prescribed and is not veering from agreed treatment regimen or provider recommendations. There have been no inappropriate refills or lost/stolen meds reported.  Affect/Mood: Pain is impacting patient's mood.  Patient reports stable depression/anxiety.    Nonnarcotic treatments that are being used: topical agents, heat and Cannot take nonsteroidal anti-inflammatory drugs due to severe allergy to these medications and history of embolic stroke.     Last imagin CT of lumbar spine    Opioid Risk Score: 1    Interpretation of Opioid Risk Score   Score 0-3 = Low risk of abuse. Do UDS at least once per year.  Score 4-7 = Moderate risk of abuse. Do UDS 1-4 times per year.  Score 8+ = High risk of abuse. Refer to specialist.    Last order of CONTROLLED SUBSTANCE TREATMENT AGREEMENT was found on 2018 from Office Visit on 2018     UDS Summary                URINE DRUG SCREEN Next Due 2020      Done 2019 PAIN MANAGEMENT SCREEN     Patient has more history with this topic...        Most recent UDS reviewed today and is consistent with prescribed medications.     I have reviewed the medical records, the Prescription Monitoring Program and I have determined that controlled substance treatment is medically indicated.       Followup: Return in about 3 months (around 2019), or if symptoms worsen or fail to improve.

## 2019-10-17 NOTE — TELEPHONE ENCOUNTER
1. Caller Name: Lakisha Bhatti                                         Call Back Number: [phone] (home)       Patient approves a detailed voicemail message: yes    Patient called and said she wants to be changed back to her original pain pills. She said she can't sleep at night and if she doesn't get back on them she's going to start her drugs. She also said the dose you changed her to isn't working.

## 2019-10-17 NOTE — TELEPHONE ENCOUNTER
Unfortunately I cannot do anything about that until I am back in the office on Monday.  Could you ask Dr. Dominguez to look at this?  Very severe rheumatoid arthritis.  Unfortunately she was on more hydrocodone than I can write anymore.  We can go to 4 per day of the 10s.  I can also send her to pain management but that will take several weeks.

## 2019-10-21 NOTE — PROGRESS NOTES
7.5 mg has been completely ineffective for the patient.  She is in considerable pain.  We will resume the 10 mg but only 5/day to comply with CDC guidelines.  She will supplement with additional Tylenol as needed, not to exceed 3000 mg/day.

## 2019-11-03 PROBLEM — I71.40 ABDOMINAL AORTIC ANEURYSM (AAA) WITHOUT RUPTURE (HCC): Status: ACTIVE | Noted: 2019-01-01

## 2019-11-03 PROBLEM — E43 SEVERE PROTEIN-CALORIE MALNUTRITION (HCC): Status: ACTIVE | Noted: 2017-07-03

## 2019-11-03 NOTE — ASSESSMENT & PLAN NOTE
Patient has advanced osteoporosis that is a result of years of steroid use  Presents with L4 compression fracture and intractable pain, subacute  Admit for pain control  I ordered calcitonin for acute pain of compression fracture  Calcium and vitamin D  PT/OT    11/4 consulted neurosurgery, Dr. Betancur, may benefit from IR decompressive procedure  Now with inability to ambulate, slightly improving muscle strength today  Pain control  PT/OT evaluation    Family would like to continue conservative therapy  Patient is somewhat showing signs of improvement in back pain and lower extremity strength  Lidocaine patch trial  PT/OT  Pain control  Palliative care consult    11/5: still with increased pain, does not want surgery, pt/ot or rehab, wants pain control and home dc  Will have palliative and hospice care see her, she was on hospice in the past per her

## 2019-11-03 NOTE — ASSESSMENT & PLAN NOTE
Uncontrolled hypertension in the emergency room  Likely related to pain  Continue diltiazem and lisinopril

## 2019-11-03 NOTE — ED TRIAGE NOTES
".  Chief Complaint   Patient presents with   • Low Back Pain   • Hypertension   Pt BIB EMS from home.  Pt c/o left low back pain - Pt was moving a table yesterday at approximately 1330, \" heard a pop. \"  Pt reports no extremity deficits.  Pt has history of rheumatoid arthritis.  Per family Pt had a recent increase in pain medication - Norco 10/325 mg five times per day.  Pt did not take hypertension medication this morning.  BP /95.  Pt received Fentanyl 50 mcg IVP PTA.  FSBS 81.     "

## 2019-11-03 NOTE — ASSESSMENT & PLAN NOTE
Patient has AAA that has increased in size to 3.7cm  Discussed need for outpatient follow up with patient and her daughter

## 2019-11-03 NOTE — ED PROVIDER NOTES
"ED Provider Note    CHIEF COMPLAINT  Chief Complaint   Patient presents with   • Low Back Pain   • Hypertension       HPI  Lakisha Bhatti is a 74 y.o. female who presents for evaluation of acute low back pain.  Patient has a complex medical history including severe rheumatoid arthritis.  She apparently has had several compression fractures.  She was lifting a box yesterday felt a crunching sensation in her low back.  This was yesterday at around 1 PM.  She has had debilitating pain has been unable to get out of bed ever since.  She has pain medicine at home was taking that but then she had her daughter come over and they called 911.  Of IV was established she was given IV fentanyl prior to arrival.  Patient denies any incontinence numbness weakness or tingling.  The patient is also been having some chronic abdominal cramping not feeling well for several days leading up to this.  No history of back surgery    REVIEW OF SYSTEMS  See HPI for further details.  Positive for abdominal pain back all other systems are negative.     PAST MEDICAL HISTORY  Past Medical History:   Diagnosis Date   • Stroke ([facility]) 2016    May 2016 X2 strokes.  \"Sometimes has trouble putting thoughts together\".   • Hypertension 2015    pt states its been running high pt is very nervous about upcoming procedure   • MEDICAL HOME 10/10/2012   • Acute ischemic stroke ([facility]) 1/2010   • History of cerebrovascular accident with residual effects 1/2010    some memory loss   • Stroke ([facility]) 2010    no residual weakness   • Rheumatoid arthritis(714.0) 1997    Dr. Goodwin   • Anesthesia     ponv   • ATHEROSCLEROSIS    • Bleeding disorder ([facility])     prolonged bleeding   • Blood clotting disorder ([facility])     12/2015   • Breath shortness     since    • Carotid artery stenosis     Dr. Silver   • CATARACT     Removed 2000   • Dental disorder     upper and lower dentures   • Dyslipidemia, goal LDL below 100    • Emphysema of lung ([facility])     No oxygen use at " this time.   • Fibromyalgia    • High cholesterol    • Iliac artery stenosis, right ([facility])    • Indigestion    • Iron deficiency anemia, unspecified    • Osteoarthritis    • OSTEOPOROSIS    • Pain     severe pain in back   • RA (rheumatoid arthritis) ([facility])    • TIA (transient ischemic attack)    • Tubular adenoma of colon     Dr. Velazquez   • Vasculitis of the skin     left leg   • Vertebral fracture, osteoporotic ([facility])     several   • Vitamin d deficiency        FAMILY HISTORY  Noncontributory    SOCIAL HISTORY  Social History     Socioeconomic History   • Marital status:      Spouse name: Not on file   • Number of children: Not on file   • Years of education: Not on file   • Highest education level: Not on file   Occupational History   • Not on file   Social Needs   • Financial resource strain: Not on file   • Food insecurity:     Worry: Not on file     Inability: Not on file   • Transportation needs:     Medical: Not on file     Non-medical: Not on file   Tobacco Use   • Smoking status: Current Every Day Smoker     Packs/day: 1.00     Years: 52.00     Pack years: 52.00     Types: Cigarettes   • Smokeless tobacco: Never Used   • Tobacco comment: she is working on reducing and weaning off, discussed   Substance and Sexual Activity   • Alcohol use: No     Alcohol/week: 0.0 oz     Comment: patient feels not ready to quit   • Drug use: No   • Sexual activity: Never   Lifestyle   • Physical activity:     Days per week: Not on file     Minutes per session: Not on file   • Stress: Not on file   Relationships   • Social connections:     Talks on phone: Not on file     Gets together: Not on file     Attends Jain service: Not on file     Active member of club or organization: Not on file     Attends meetings of clubs or organizations: Not on file     Relationship status: Not on file   • Intimate partner violence:     Fear of current or ex partner: Not on file     Emotionally abused: Not on file     Physically  abused: Not on file     Forced sexual activity: Not on file   Other Topics Concern   • Not on file   Social History Narrative    ** Merged History Encounter **            SURGICAL HISTORY  Past Surgical History:   Procedure Laterality Date   • GASTROSCOPY-ENDO N/A 1/18/2016    Procedure: GASTROSCOPY-ENDO;  Surgeon: Pro Moreira M.D.;  Location: [facility];  Service:    • COLONOSCOPY - ENDO N/A 1/18/2016    Procedure: COLONOSCOPY - ENDO;  Surgeon: Pro Moreira M.D.;  Location: SURGERY [facility];  Service:    • GASTROSCOPY WITH BIOPSY N/A 1/18/2016    Procedure: GASTROSCOPY WITH BIOPSY;  Surgeon: Pro Moreira M.D.;  Location: [facility];  Service:    • THROMBECTOMY Left 12/22/2015    Procedure: LEFT UPPER EXTREMITY BRACHIAL  THROMBECTOMY;  Surgeon: Presley Allison M.D.;  Location: [facility];  Service:    • RECOVERY  12/10/2015    Procedure: VASCULAR CASE-STEVIE-ABDOMINAL AORTOGRAM WITH RUNOFF/VISCERAL ANGIOGRAM 43825, 79965, 44159, 65098- ABDOMINAL PAIN POSSIBLE MESENTERIC ARTERY ISCHEMIA, ATHEROSCLEROSIS OF AORTA I70.0, R10.9;  Surgeon: [facility];  Location: SURG[place] PRE[place] PROC [place];  Service:    • CYSTOCELE REPAIR  1/27/2012    Performed by PADMINI RAM at [facility]   • RECTOCELE REPAIR  1/27/2012    Performed by PADMINI RAM at [facility]   • CAROTID ENDARTERECTOMY  2/18/2010    left   • CATARACT PHACO WITH IOL      b/l   • CHOLECYSTECTOMY     • INCISION HERNIA REPAIR      with bladder repair   • VAGINAL HYSTERECTOMY TOTAL      Hysterectomy,Total Vaginal       CURRENT MEDICATIONS  No current facility-administered medications for this encounter.     Current Outpatient Medications:   •  [START ON 12/20/2019] HYDROcodone/acetaminophen (NORCO)  MG Tab, Take 1 Tab by mouth every four hours as needed for Moderate Pain or Severe Pain (rheumatoid arthritis, max 5 per day) for up to 30 days., Disp: 150  "Tab, Rfl: 0  •  ferrous sulfate 325 (65 Fe) MG tablet, 1 tab po tid as tolerated., Disp: 100 Tab, Rfl: 2  •  clopidogrel (PLAVIX) 75 MG Tab, Take 1 Tab by mouth every day., Disp: 90 Tab, Rfl: 3  •  lisinopril (PRINIVIL, ZESTRIL) 40 MG tablet, Take 1 Tab by mouth every day., Disp: 100 Tab, Rfl: 3  •  traZODone (DESYREL) 50 MG Tab, Take 1 Tab by mouth every bedtime., Disp: 90 Tab, Rfl: 2  •  CARTIA  MG CAPSULE SR 24 HR, TAKE ONE CAPSULE BY MOUTH ONCE DAILY, Disp: 90 Cap, Rfl: 3  •  DILTIAZem CD (CARTIA XT) 180 MG CAPSULE SR 24 HR, Take 1 Cap by mouth every day., Disp: 90 Cap, Rfl: 3  •  atorvastatin (LIPITOR) 40 MG Tab, Take 1 Tab by mouth every bedtime., Disp: 90 Tab, Rfl: 3  •  aspirin  MG Tablet Delayed Response, Take 1 Tab by mouth every day., Disp: 100 Tab, Rfl: 3  •  predniSONE (DELTASONE) 5 MG Tab, Take 5 mg by mouth every day., Disp: , Rfl:   •  folic acid (FOLVITE) 1 MG TABS, Take 2 mg by mouth every day., Disp: , Rfl:       ALLERGIES  Allergies   Allergen Reactions   • Gold Hives   • Hydrochlorothiazide [Hctz] Itching   • Imuran [Azathioprine Sodium] Vomiting   • Naproxen Itching   • Azathioprine      DOES NOT REMEMBER REACTIOON   • Hydrochlorothiazide    • Naproxen    • Relafen [Nabumetone]      Did not work   • Tape      Paper tape is ok       PHYSICAL EXAM  VITAL SIGNS: BP (!) 184/103   Pulse 88   Temp 37.3 °C (99.1 °F) (Temporal)   Resp 12   Ht 1.6 m (5' 3\")   Wt 37.6 kg (83 lb)   SpO2 91%   BMI 14.70 kg/m²       Constitutional: Elderly cachectic patient appears chronically ill  HENT: Normocephalic, Atraumatic, Bilateral external ears normal, Oropharynx moist, No oral exudates, Nose normal.   Eyes: PERRLA, EOMI, Conjunctiva normal, No discharge.   Neck: Normal range of motion, No tenderness, Supple, No stridor.   Cardiovascular: Normal heart rate, Normal rhythm, No murmurs, No rubs, No gallops.   Thorax & Lungs: Normal breath sounds, No respiratory distress, No wheezing, No chest " tenderness.   Abdomen: Bowel sounds normal, Soft, No tenderness, No masses, No pulsatile masses.   Skin: Warm, Dry, No erythema, No rash.   Back exquisite tenderness at L3-L4 without step-off, No CVA tenderness.   Genitalia: Groin sensation is normal   extremities: Intact distal pulses, No edema, No tenderness, No cyanosis, No clubbing.   Musculoskeletal: Good range of motion in all major joints. No tenderness to palpation or major deformities noted.   Neurologic: Alert & oriented x 3, Normal motor function, Normal sensory function, No focal deficits noted.  Lateral patellar DTRs intact normal strength and sensation  Psychiatric: Anxious    Results for orders placed or performed during the hospital encounter of 11/03/19   URINALYSIS   Result Value Ref Range    Color Yellow     Character Clear     Specific Gravity 1.008 <1.035    Ph 7.5 5.0 - 8.0    Glucose Negative Negative mg/dL    Ketones Negative Negative mg/dL    Protein Negative Negative mg/dL    Bilirubin Negative Negative    Urobilinogen, Urine 0.2 Negative    Nitrite Negative Negative    Leukocyte Esterase Negative Negative    Occult Blood Negative Negative    Micro Urine Req see below    CBC WITH DIFFERENTIAL   Result Value Ref Range    WBC 8.5 4.8 - 10.8 K/uL    RBC 4.85 4.20 - 5.40 M/uL    Hemoglobin 9.5 (L) 12.0 - 16.0 g/dL    Hematocrit 36.4 (L) 37.0 - 47.0 %    MCV 75.1 (L) 81.4 - 97.8 fL    MCH 19.6 (L) 27.0 - 33.0 pg    MCHC 26.1 (L) 33.6 - 35.0 g/dL    RDW 52.4 (H) 35.9 - 50.0 fL    Platelet Count 237 164 - 446 K/uL    MPV 9.8 9.0 - 12.9 fL    Neutrophils-Polys 78.20 (H) 44.00 - 72.00 %    Lymphocytes 10.20 (L) 22.00 - 41.00 %    Monocytes 8.90 0.00 - 13.40 %    Eosinophils 0.90 0.00 - 6.90 %    Basophils 1.20 0.00 - 1.80 %    Immature Granulocytes 0.60 0.00 - 0.90 %    Nucleated RBC 0.00 /100 WBC    Neutrophils (Absolute) 6.62 2.00 - 7.15 K/uL    Lymphs (Absolute) 0.86 (L) 1.00 - 4.80 K/uL    Monos (Absolute) 0.75 0.00 - 0.85 K/uL    Eos (Absolute)  0.08 0.00 - 0.51 K/uL    Baso (Absolute) 0.10 0.00 - 0.12 K/uL    Immature Granulocytes (abs) 0.05 0.00 - 0.11 K/uL    NRBC (Absolute) 0.00 K/uL   Comp Metabolic Panel   Result Value Ref Range    Sodium 137 135 - 145 mmol/L    Potassium 3.2 (L) 3.6 - 5.5 mmol/L    Chloride 104 96 - 112 mmol/L    Co2 26 20 - 33 mmol/L    Anion Gap 7.0 0.0 - 11.9    Glucose 83 65 - 99 mg/dL    Bun 14 8 - 22 mg/dL    Creatinine 0.60 0.50 - 1.40 mg/dL    Calcium 8.9 8.5 - 10.5 mg/dL    AST(SGOT) 16 12 - 45 U/L    ALT(SGPT) 6 2 - 50 U/L    Alkaline Phosphatase 71 30 - 99 U/L    Total Bilirubin 0.9 0.1 - 1.5 mg/dL    Albumin 4.1 3.2 - 4.9 g/dL    Total Protein 6.6 6.0 - 8.2 g/dL    Globulin 2.5 1.9 - 3.5 g/dL    A-G Ratio 1.6 g/dL   ESTIMATED GFR   Result Value Ref Range    GFR If African American >60 >60 mL/min/1.73 m 2    GFR If Non African American >60 >60 mL/min/1.73 m 2   PERIPHERAL SMEAR REVIEW   Result Value Ref Range    Peripheral Smear Review see below    DIFFERENTIAL COMMENT   Result Value Ref Range    Comments-Diff see below      CT-ABDOMEN-PELVIS WITH   Final Result   Addendum 1 of 1      11/3/2019 9:56 AM      HISTORY/REASON FOR EXAM:  Abd pain, gastroenteritis or colitis suspected.   Low back pain      TECHNIQUE/EXAM DESCRIPTION:   CT scan of the abdomen and pelvis with    contrast.      Contrast-enhanced helical scanning was obtained from the diaphragmatic    domes through the pubic symphysis following the bolus administration of    nonionic contrast without complication.      70 mL of Omnipaque 350 nonionic contrast was administered without    complication.      Low dose optimization technique was utilized for this CT exam including    automated exposure control and adjustment of the mA and/or kV according to    patient size.      COMPARISON: 11/3/2015      FINDINGS: There is hyperexpansion of the lung bases with mild peripheral    reticulation.      CT Abdomen:   The liver, spleen, adrenal glands, and pancreas are  unremarkable.      There is mild intrahepatic and significant extrahepatic ductal dilatation.    The distal common bile duct measures approximately 12 mm, unchanged. No    definite choledocholithiasis is appreciated. The gallbladder has been    removed.      The kidneys enhance symmetrically. There are bilateral hypodense masses.    The largest mass in the lower pole the left kidney is consistent with a    cyst. Additional masses are indeterminate in measure up to 3.6 cm,    previously 3.2 cm.      The abdominal aorta is markedly ectatic. There are scattered arterial    calcifications. There is aneurysmal dilatation with peripheral low-density    plaque. Maximum diameters are approximately 3.7 x 3.3 cm. There is dense    calcification in the common iliac    arteries with bilateral stents. A thrombosed left common iliac artery    aneurysm is peripheral to the stent.      There is a large amount of colonic stool. No significant bowel wall    thickening is identified. The appendix is not identified. No    pneumoperitoneum.      CT Pelvis:   The bladder is unremarkable.      No significant free fluid or adenopathy is identified.      Multilevel compression deformities are again noted and will be described    on separate lumbar spine CT. The bones are osteopenic.         1.  No acute intra-abdominal findings.   2.  Large amount of colonic stool.   3.  Severe atherosclerotic disease. Infrarenal abdominal aortic aneurysm    has increased in size from the prior exam.   4.  Stable extrahepatic ductal dilatation.   5.  Bilateral renal masses, likely hyperdense/proteinaceous cyst. Neoplasm    cannot be excluded. If clinically indicated, follow-up renal protocol CT    or MRI is recommended for further evaluation.      Final      CT-LSPINE W/O PLUS RECONS   Final Result      1.  Compression deformity involving the L4 vertebral body has worsened from the prior CT and is likely subacute.      2.  Additional compression deformities  are unchanged.      3.  Osteopenia          COURSE & MEDICAL DECISION MAKING  Pertinent Labs & Imaging studies reviewed. (See chart for details)  An IV was established.  I reviewed the patient's visit history including her prior history of back injuries, her a abdominal aortic aneurysm.  Here she presents with acute low back pain.  She is neurologically intact but has severe debilitating pain.  CT scan demonstrates a worsening compression deformity at L4 compared to prior imaging.  She also has slightly increased abdominal aortic aneurysm but no suggestion of rupture leak etc.  Given the patient's age and comorbidities she will be admitted to internal medicine for pain control.  Family is in the process of deciding whether or not they would want any sort of prolonged rehab or simply try to take her home after her pain and limitations are addressed    FINAL IMPRESSION  1.  Acute L4 compression fracture  2.  Refractory back pain secondary to #1    Admission         Electronically signed by: Flynn Sheikh, 11/3/2019 9:02 AM

## 2019-11-03 NOTE — H&P
Hospital Medicine History & Physical Note    Date of Service  11/3/2019    Primary Care Physician  Kamlesh Padilla M.D.    Consultants  None    Code Status  Full Code    Chief Complaint  Low back pain    History of Presenting Illness  74 y.o. female who presented 11/3/2019 with low back pain.  Ms. Bhatti has a history of peripheral arterial disease, rheumatoid arthritis and osteoporosis.  She has significant chronic pain syndrome related to her rheumatoid arthritis.    Patient presents with severe low back pain that started after lifting a table yesterday.  She indicates that she heard a pop when she lifted up and has since had severe 10/10 low back pain.  The patient is normally ambulatory but she has been unable to walk to the pain.  She has some chronic numbness and tingling in her lower extremities, this remains unchanged from her baseline.  She does not believe that she has any new weakness in her lower extremities is just difficult to walk because of the pain, no loss of bowel or bladder control.    Review of Systems  Review of Systems   Constitutional: Positive for weight loss. Negative for chills and malaise/fatigue.   Respiratory: Negative for cough, hemoptysis and sputum production.    Cardiovascular: Negative for chest pain, palpitations and orthopnea.   Gastrointestinal: Positive for nausea. Negative for vomiting.   Musculoskeletal: Positive for back pain.   Skin: Negative for itching and rash.   Neurological: Negative for dizziness.   All other systems reviewed and are negative.      Past Medical History   has a past medical history of Acute ischemic stroke (HCC) (1/2010), Anesthesia, ATHEROSCLEROSIS, Bleeding disorder (HCC), Blood clotting disorder (HCC), Breath shortness, Carotid artery stenosis, CATARACT, Dental disorder, Dyslipidemia, goal LDL below 100, Emphysema of lung (HCC), Fibromyalgia, High cholesterol, History of cerebrovascular accident with residual effects (1/2010), Hypertension (2015),  Iliac artery stenosis, right ([facility]), Indigestion, Iron deficiency anemia, unspecified, [facility] (10/10/2012), Osteoarthritis, OSTEOPOROSIS, Pain, RA (rheumatoid arthritis) (HCC), Rheumatoid arthritis(714.0) (1997), Stroke ([facility]) (2010), Stroke ([facility]) (2016), TIA (transient ischemic attack), Tubular adenoma of colon, Vasculitis of the skin, Vertebral fracture, osteoporotic ([facility]), and Vitamin d deficiency.    Surgical History   has a past surgical history that includes cholecystectomy; vaginal hysterectomy total; carotid endarterectomy (2/18/2010); cataract phaco with iol; incision hernia repair; cystocele repair (1/27/2012); rectocele repair (1/27/2012); recovery (12/10/2015); thrombectomy (Left, 12/22/2015); gastroscopy-endo (N/A, 1/18/2016); colonoscopy - endo (N/A, 1/18/2016); and gastroscopy with biopsy (N/A, 1/18/2016).     Family History  family history includes Heart Disease in her father; Hyperlipidemia in her mother; Hypertension in her mother; Lung Disease in her father; Non-contributory in her mother; Stroke in her father.     Social History   reports that she has been smoking cigarettes. She has a 52.00 pack-year smoking history. She has never used smokeless tobacco. She reports that she does not drink alcohol or use drugs.    Allergies  Allergies   Allergen Reactions   • Gold Hives   • Hydrochlorothiazide [Hctz] Itching   • Imuran [Azathioprine Sodium] Vomiting   • Naproxen Itching   • Azathioprine      DOES NOT REMEMBER REACTIOON   • Hydrochlorothiazide    • Naproxen    • Relafen [Nabumetone]      Did not work   • Tape      Paper tape is ok       Medications  Prior to Admission Medications   Prescriptions Last Dose Informant Patient Reported? Taking?   DILTIAZem CD (CARTIA XT) 180 MG CAPSULE SR 24 HR 11/1/2019 at Unknown time  No No   Sig: Take 1 Cap by mouth every day.   HYDROcodone/acetaminophen (NORCO)  MG Tab 11/3/2019 at Unknown time  Yes Yes   Sig: Take 1 Tab by mouth 5 Times a Day.    aspirin  MG Tablet Delayed Response 11/1/2019 at Unknown time  Yes No   Sig: Take 1 Tab by mouth every day.   atorvastatin (LIPITOR) 40 MG Tab 11/1/2019 at Unknown time  No No   Sig: Take 1 Tab by mouth every bedtime.   clopidogrel (PLAVIX) 75 MG Tab 11/1/2019 at Unknown time  No No   Sig: Take 1 Tab by mouth every day.   lisinopril (PRINIVIL, ZESTRIL) 40 MG tablet 11/1/2019 at Unknown time  No No   Sig: Take 1 Tab by mouth every day.   predniSONE (DELTASONE) 5 MG Tab 11/1/2019 at Unknown time Patient Yes No   Sig: Take 5 mg by mouth every day.   traZODone (DESYREL) 50 MG Tab unknown at Unknown time  Yes Yes   Sig: Take 50 mg by mouth at bedtime as needed for Sleep.      Facility-Administered Medications: None       Physical Exam  Temp:  [37.3 °C (99.1 °F)] 37.3 °C (99.1 °F)  Pulse:  [71-88] 88  Resp:  [12-24] 12  BP: (178-184)/() 184/103  SpO2:  [91 %-94 %] 91 %    Physical Exam  HENT:      Head: Normocephalic and atraumatic.      Right Ear: Tympanic membrane normal.      Left Ear: Tympanic membrane normal.      Mouth/Throat:      Mouth: Mucous membranes are moist.      Pharynx: Oropharynx is clear.   Eyes:      General: No scleral icterus.     Pupils: Pupils are equal, round, and reactive to light.   Neck:      Musculoskeletal: Neck rigidity present.      Comments: Limited ROM due to arthritis  Cardiovascular:      Rate and Rhythm: Normal rate and regular rhythm.   Pulmonary:      Effort: Pulmonary effort is normal.      Breath sounds: Normal breath sounds. No rales.   Abdominal:      General: Abdomen is flat.      Palpations: Abdomen is soft.      Tenderness: There is no tenderness. There is no guarding.   Musculoskeletal:      Comments: Lumbar spine tender to palpation   Skin:     General: Skin is warm and dry.   Neurological:      General: No focal deficit present.      Mental Status: She is alert.         Laboratory:  Recent Labs     11/03/19  0854   WBC 8.5   RBC 4.85   HEMOGLOBIN 9.5*    HEMATOCRIT 36.4*   MCV 75.1*   MCH 19.6*   MCHC 26.1*   RDW 52.4*   PLATELETCT 237   MPV 9.8     Recent Labs     11/03/19  0854   SODIUM 137   POTASSIUM 3.2*   CHLORIDE 104   CO2 26   GLUCOSE 83   BUN 14   CREATININE 0.60   CALCIUM 8.9     Recent Labs     11/03/19  0854   ALTSGPT 6   ASTSGOT 16   ALKPHOSPHAT 71   TBILIRUBIN 0.9   GLUCOSE 83         No results for input(s): NTPROBNP in the last 72 hours.      No results for input(s): TROPONINT in the last 72 hours.    Urinalysis:    Recent Labs     11/03/19  0925   SPECGRAVITY 1.008   GLUCOSEUR Negative   KETONES Negative   NITRITE Negative   LEUKESTERAS Negative        Imaging:  CT-ABDOMEN-PELVIS WITH   Final Result   Addendum 1 of 1      11/3/2019 9:56 AM      HISTORY/REASON FOR EXAM:  Abd pain, gastroenteritis or colitis suspected.   Low back pain      TECHNIQUE/EXAM DESCRIPTION:   CT scan of the abdomen and pelvis with    contrast.      Contrast-enhanced helical scanning was obtained from the diaphragmatic    domes through the pubic symphysis following the bolus administration of    nonionic contrast without complication.      70 mL of Omnipaque 350 nonionic contrast was administered without    complication.      Low dose optimization technique was utilized for this CT exam including    automated exposure control and adjustment of the mA and/or kV according to    patient size.      COMPARISON: 11/3/2015      FINDINGS: There is hyperexpansion of the lung bases with mild peripheral    reticulation.      CT Abdomen:   The liver, spleen, adrenal glands, and pancreas are unremarkable.      There is mild intrahepatic and significant extrahepatic ductal dilatation.    The distal common bile duct measures approximately 12 mm, unchanged. No    definite choledocholithiasis is appreciated. The gallbladder has been    removed.      The kidneys enhance symmetrically. There are bilateral hypodense masses.    The largest mass in the lower pole the left kidney is consistent  with a    cyst. Additional masses are indeterminate in measure up to 3.6 cm,    previously 3.2 cm.      The abdominal aorta is markedly ectatic. There are scattered arterial    calcifications. There is aneurysmal dilatation with peripheral low-density    plaque. Maximum diameters are approximately 3.7 x 3.3 cm. There is dense    calcification in the common iliac    arteries with bilateral stents. A thrombosed left common iliac artery    aneurysm is peripheral to the stent.      There is a large amount of colonic stool. No significant bowel wall    thickening is identified. The appendix is not identified. No    pneumoperitoneum.      CT Pelvis:   The bladder is unremarkable.      No significant free fluid or adenopathy is identified.      Multilevel compression deformities are again noted and will be described    on separate lumbar spine CT. The bones are osteopenic.         1.  No acute intra-abdominal findings.   2.  Large amount of colonic stool.   3.  Severe atherosclerotic disease. Infrarenal abdominal aortic aneurysm    has increased in size from the prior exam.   4.  Stable extrahepatic ductal dilatation.   5.  Bilateral renal masses, likely hyperdense/proteinaceous cyst. Neoplasm    cannot be excluded. If clinically indicated, follow-up renal protocol CT    or MRI is recommended for further evaluation.      Final      CT-LSPINE W/O PLUS RECONS   Final Result      1.  Compression deformity involving the L4 vertebral body has worsened from the prior CT and is likely subacute.      2.  Additional compression deformities are unchanged.      3.  Osteopenia            Assessment/Plan:  I anticipate this patient is appropriate for observation status at this time.    * Compression fracture of L4 vertebra (HCC)- (present on admission)  Assessment & Plan  Patient has advanced osteoporosis that is a result of years of steroid use  Presents with L4 compression fracture and intractable pain  Admit for pain control  I  ordered calcitonin for acute pain of compression fracture  Calcium and vitamin D  PT/OT    Rheumatoid arthritis involving multiple sites with positive rheumatoid factor (CMS-[facility])- (present on admission)  Assessment & Plan  Chronic rheumatoid arthritis  She has not tolerated DMARDS in the past  Continue home dose of prednisone, 5mg daily    Essential hypertension- (present on admission)  Assessment & Plan  Uncontrolled hypertension in the emergency room  Likely related to pain  Continue diltiazem and lisinopril    Abdominal aortic aneurysm (AAA) without rupture (HCC)- (present on admission)  Assessment & Plan  Patient has AAA that has increased in size to 3.7cm  Discussed need for outpatient follow up with patient and her daughter    Severe protein-calorie malnutrition (HCC)- (present on admission)  Assessment & Plan  Clinical diagnosis based on cachectic appearance and low BMI    Hypokalemia- (present on admission)  Assessment & Plan  Replace with IV fluids and PO  I ordered repeat labs to follow her response to treatment      VTE prophylaxis: lovenox

## 2019-11-03 NOTE — ED NOTES
Pt transported to CT on [facility] by [facility].

## 2019-11-03 NOTE — ASSESSMENT & PLAN NOTE
Chronic rheumatoid arthritis  She has not tolerated DMARDS in the past  Continue home dose of prednisone, 5mg daily

## 2019-11-04 NOTE — PROGRESS NOTES
[facility] Medicine Daily Progress Note    Date of Service  11/4/2019    Chief Complaint  74 y.o. female admitted 11/3/2019 with history of severe RA on chronic prednisone presents after hearing a pop with acute low back pain and inability to ambulate.  CT of the lumbar spine with subacute L4 compression fracture.  She is also found to have a enlarging abdominal aortic aneurysm measuring 3.5 x 3.3 cm.    She is admitted for back pain and compression fracture.    Hospital Course    74 y.o. female admitted 11/3/2019 with history of severe RA on chronic prednisone presents after hearing a pop with acute low back pain and inability to ambulate.  CT of the lumbar spine with subacute L4 compression fracture.  She is also found to have a enlarging abdominal aortic aneurysm measuring 3.5 x 3.3 cm.    She is admitted for back pain and compression fracture.      Interval Problem Update  Patient is sitting up in a chair, improving muscle strength of the lower extremities, still with generalized weakness  Reports improvement in low back pain  Denies any chest pain, shortness of breath, fever or chills  Patient's daughter reports 60 pound weight loss due to poor appetite and pain      We did have an extensive discussion with patient and family at bedside, they would like to continue conservative therapy  They would like to avoid any procedures  I had a discussion with Dr. Betancur, to evaluate  However at this point family is opting against any surgical procedures.  Palliative care to be consulted    Consultants/Specialty  Neurosurgery    Code Status  Full    Disposition  TBD    The total time spent face to face with this patient was 55 mins of which 60% of time was spent on counseling and coordinating care.  Specifically speaking w/ rn, team and pt at length re plan of care.  Brief discussion regarding advanced directives, palliative care to assist.    Review of Systems  Review of Systems   Constitutional: Negative for chills,  diaphoresis, fever and malaise/fatigue.   HENT: Negative for congestion and hearing loss.    Respiratory: Negative for cough, sputum production, shortness of breath and wheezing.    Cardiovascular: Negative for chest pain, palpitations and leg swelling.   Gastrointestinal: Negative for abdominal pain, nausea and vomiting.   Genitourinary: Negative for dysuria and flank pain.   Musculoskeletal: Positive for back pain and myalgias. Negative for falls and joint pain.   Neurological: Negative for dizziness, tremors, sensory change, speech change, focal weakness, seizures, loss of consciousness, weakness and headaches.   Psychiatric/Behavioral: Negative for depression and memory loss. The patient is not nervous/anxious.         Physical Exam  Temp:  [36.2 °C (97.1 °F)-37.2 °C (99 °F)] 36.9 °C (98.5 °F)  Pulse:  [65-96] 66  Resp:  [14-24] 20  BP: (116-205)/(42-92) 124/42  SpO2:  [93 %-100 %] 100 %    Physical Exam  Vitals signs and nursing note reviewed.   Constitutional:       General: She is not in acute distress.     Appearance: She is not diaphoretic.      Comments: Frail, elderly, cachectic   HENT:      Head: Normocephalic and atraumatic.      Nose: Nose normal.   Eyes:      General:         Right eye: No discharge.         Left eye: No discharge.      Pupils: Pupils are equal, round, and reactive to light.   Neck:      Musculoskeletal: Neck supple.      Thyroid: No thyromegaly.      Vascular: No JVD.   Cardiovascular:      Rate and Rhythm: Normal rate.      Heart sounds: No murmur.   Pulmonary:      Effort: No respiratory distress.      Breath sounds: Normal breath sounds. No wheezing.   Abdominal:      General: Bowel sounds are normal. There is no distension.      Palpations: Abdomen is soft.      Tenderness: There is no tenderness.   Musculoskeletal:         General: No tenderness.   Skin:     General: Skin is warm and dry.      Findings: No erythema or rash.   Neurological:      Mental Status: She is alert and  oriented to person, place, and time.      Cranial Nerves: No cranial nerve deficit.      Sensory: No sensory deficit.      Motor: Weakness present.      Coordination: Coordination normal.      Gait: Gait abnormal.      Deep Tendon Reflexes: Reflexes normal.   Psychiatric:         Behavior: Behavior normal.         Thought Content: Thought content normal.         Fluids    Intake/Output Summary (Last 24 hours) at 11/4/2019 1213  Last data filed at 11/4/2019 0900  Gross per 24 hour   Intake 200 ml   Output --   Net 200 ml       Laboratory  Recent Labs     11/03/19  0854 11/04/19  0330   WBC 8.5 8.7   RBC 4.85 4.80   HEMOGLOBIN 9.5* 9.2*   HEMATOCRIT 36.4* 36.7*   MCV 75.1* 76.5*   MCH 19.6* 19.2*   MCHC 26.1* 25.1*   RDW 52.4* 54.0*   PLATELETCT 237 178   MPV 9.8  --      Recent Labs     11/03/19  0854 11/04/19  0330   SODIUM 137 138   POTASSIUM 3.2* 3.9   CHLORIDE 104 107   CO2 26 25   GLUCOSE 83 86   BUN 14 16   CREATININE 0.60 0.54   CALCIUM 8.9 9.1                   Imaging  CT-ABDOMEN-PELVIS WITH   Final Result   Addendum 1 of 1      11/3/2019 9:56 AM      HISTORY/REASON FOR EXAM:  Abd pain, gastroenteritis or colitis suspected.   Low back pain      TECHNIQUE/EXAM DESCRIPTION:   CT scan of the abdomen and pelvis with    contrast.      Contrast-enhanced helical scanning was obtained from the diaphragmatic    domes through the pubic symphysis following the bolus administration of    nonionic contrast without complication.      70 mL of Omnipaque 350 nonionic contrast was administered without    complication.      Low dose optimization technique was utilized for this CT exam including    automated exposure control and adjustment of the mA and/or kV according to    patient size.      COMPARISON: 11/3/2015      FINDINGS: There is hyperexpansion of the lung bases with mild peripheral    reticulation.      CT Abdomen:   The liver, spleen, adrenal glands, and pancreas are unremarkable.      There is mild intrahepatic and  significant extrahepatic ductal dilatation.    The distal common bile duct measures approximately 12 mm, unchanged. No    definite choledocholithiasis is appreciated. The gallbladder has been    removed.      The kidneys enhance symmetrically. There are bilateral hypodense masses.    The largest mass in the lower pole the left kidney is consistent with a    cyst. Additional masses are indeterminate in measure up to 3.6 cm,    previously 3.2 cm.      The abdominal aorta is markedly ectatic. There are scattered arterial    calcifications. There is aneurysmal dilatation with peripheral low-density    plaque. Maximum diameters are approximately 3.7 x 3.3 cm. There is dense    calcification in the common iliac    arteries with bilateral stents. A thrombosed left common iliac artery    aneurysm is peripheral to the stent.      There is a large amount of colonic stool. No significant bowel wall    thickening is identified. The appendix is not identified. No    pneumoperitoneum.      CT Pelvis:   The bladder is unremarkable.      No significant free fluid or adenopathy is identified.      Multilevel compression deformities are again noted and will be described    on separate lumbar spine CT. The bones are osteopenic.         1.  No acute intra-abdominal findings.   2.  Large amount of colonic stool.   3.  Severe atherosclerotic disease. Infrarenal abdominal aortic aneurysm    has increased in size from the prior exam.   4.  Stable extrahepatic ductal dilatation.   5.  Bilateral renal masses, likely hyperdense/proteinaceous cyst. Neoplasm    cannot be excluded. If clinically indicated, follow-up renal protocol CT    or MRI is recommended for further evaluation.      Final      CT-LSPINE W/O PLUS RECONS   Final Result      1.  Compression deformity involving the L4 vertebral body has worsened from the prior CT and is likely subacute.      2.  Additional compression deformities are unchanged.      3.  Osteopenia            Assessment/Plan  * Compression fracture of L4 vertebra (HCC)- (present on admission)  Assessment & Plan  Patient has advanced osteoporosis that is a result of years of steroid use  Presents with L4 compression fracture and intractable pain, subacute  Admit for pain control  I ordered calcitonin for acute pain of compression fracture  Calcium and vitamin D  PT/OT    11/4 with associated incontinence, will consult neurosurgery, may benefit from IR decompressive procedure  Now with inability to ambulate  Pain control  PT/OT evaluation    Rheumatoid arthritis involving multiple sites with positive rheumatoid factor (CMS-[facility])- (present on admission)  Assessment & Plan  Chronic rheumatoid arthritis  She has not tolerated DMARDS in the past  Continue home dose of prednisone, 5mg daily    Essential hypertension- (present on admission)  Assessment & Plan  Uncontrolled hypertension in the emergency room  Likely related to pain  Continue diltiazem and lisinopril    Abdominal aortic aneurysm (AAA) without rupture (HCC)- (present on admission)  Assessment & Plan  Patient has AAA that has increased in size to 3.7cm  Discussed need for outpatient follow up with patient and her daughter    Severe protein-calorie malnutrition (HCC)- (present on admission)  Assessment & Plan  Clinical diagnosis based on cachectic appearance and low BMI    Hypokalemia- (present on admission)  Assessment & Plan  Replace with IV fluids and PO  I ordered repeat labs to follow her response to treatment         VTE prophylaxis: Lovenox

## 2019-11-04 NOTE — CARE PLAN
Problem: Nutritional:  Goal: Achieve adequate nutritional intake  Description  Patient will consume 50% of meals/supplements.  Outcome: PROGRESSING SLOWER THAN EXPECTED

## 2019-11-04 NOTE — RESPIRATORY CARE
" COPD EDUCATION by COPD CLINICAL EDUCATOR  11/4/2019 at 2:55 PM by Jo Ann Gonzalez     Patient reviewed by COPD education team. Patient does not have a history or diagnosis of COPD, Smoking Cessation Intervention and education completed, 3 minutes spent on smoking cessation education with patient. Provided smoking cessation packet with \"Tips to Quit\" and flyer for \"Free Smoking Cessation Classes\".     "

## 2019-11-04 NOTE — CARE PLAN
Problem: Communication  Goal: The ability to communicate needs accurately and effectively will improve  Outcome: PROGRESSING AS EXPECTED   Pt. Alert and oriented x 3 at this time and able to communicate needs with staff.   Problem: Safety  Goal: Will remain free from injury  Outcome: PROGRESSING AS EXPECTED   Pt. Calling appropriately for needs and concerns. Bed alarm in place. Hourly rounding in place.

## 2019-11-04 NOTE — PROGRESS NOTES
Pt. alert and oriented x 3 at this time with some confusion to time. Able to follow commands. Denies any SOB or chest pain. Call light in reach. Bed alarm on.

## 2019-11-04 NOTE — CONSULTS
DATE OF SERVICE:  11/04/2019    INPATIENT CONSULTATION    CHIEF COMPLAINT:  Back pain, history of compression fractures, and osteopenia   secondary to steroids.    HISTORY OF PRESENT ILLNESS:  This is a 74-year-old woman who has rheumatoid   arthritis, has been on prednisone for a very long time.  She has known   compression fractures, not significantly treated other than just intermittent   pain medications.  Without a fall yesterday, she had increase in her back   pain.  She reports weakness or numbness, but none of this is old or none of   this is new for her.  She has intermittent incontinence, but it is not because   she cannot feel what is going on.  She has a CAT scan that compared to the   previous CT scan in 2017, shows slight increase in the L4 compression   fracture.  It is hard to say that is new, old, or subacute.  She refused her   MRI, which was the initial recommendation by myself to date the fractures.    She is not interested in any interventions.    PAST MEDICAL HISTORY:  Significant for that was mentioned in the HPI.    SOCIAL HISTORY:  She used to smoke, does not drink.  No family member at her   bedside.    PHYSICAL EXAMINATION:  GENERAL:  This woman is cachectic.  She has an exaggerated thoracic kyphosis.    She has tenderness at the upper sacral in the midline.  NEUROLOGIC:  She has full strength grossly in the iliopsoas, adductor,   abductor, quadriceps, dorsiflexion, EHL, plantar flexion with intact sensation   in lower extremity dermatomes.  No pathologic reflexes.    ASSESSMENT AND PLAN:  The patient has steroid-induced osteopenia and multiple   levels of compression fractures.  It is hard to say one of them is new, old,   or the same that may be amenable to kyphoplasty, but regardless, she is   refusing the MRI, any kyphoplasty, and certainly any surgical intervention.  I   recommended an LSO brace for comfort.  She does not need to follow up   regarding this as she does not want any  treatment.    Thank you for allowing me to participate in her care.  I do recommend an   outpatient DEXA.  If it is positive treating aggressively for that to prevent   further fractures and collapse.       ____________________________________     MD JASWINDER Bay III / MANDA    DD:  11/04/2019 13:29:39  DT:  11/04/2019 14:10:22    D#:  [id number]  Job#:  [id number]

## 2019-11-04 NOTE — DISCHARGE PLANNING
Anticipated Discharge Disposition:   Home with help from spouse  Home with home health    Action:    Pt with compression fx L4.  Hx osteoporosis and chronic steroid use.  Pt pleasant stated she lives with her  in single level home.  She has a FWW and wc at home.  Her  assists her with driving, dressing, bathing, cooking, shopping.  He is in good health per patient.  Pt stated her plan is to take pain pills for pain relief and does not want any interventions.    PT/OT/SLP pending.    Palliative consult pending.    Barriers to Discharge:    Medical clearance    Plan:    Collaborate with patient and medical team.    Care Transition Team Assessment    Information Source  Orientation : Oriented x 4  Information Given By: Patient  Informant's Name: Nico BhattiEmma  Who is responsible for making decisions for patient? : Patient    Readmission Evaluation  Is this a readmission?: No    Elopement Risk  Legal Hold: No  Ambulatory or Self Mobile in Wheelchair: No-Not an Elopement Risk  Elopement Risk: Not at Risk for Elopement    Interdisciplinary Discharge Planning  Does Admitting Nurse Feel This Could be a Complex Discharge?: Yes  Primary Care Physician: Mariella Mccullough MD  Lives with - Patient's Self Care Capacity: Significant Other  Support Systems: Family Member(s), Spouse / Significant Other, Children  Housing / Facility: [address]  Do You Take your Prescribed Medications Regularly: Yes  Able to Return to Previous ADL's: Future Time w/Therapy  Mobility Issues: Yes  Patient Expects to be Discharged to:: Family refuses SNF  Assistance Needed: Yes  Durable Medical Equipment: Walker    Discharge Preparedness  What is your plan after discharge?: Home with help  What are your discharge supports?: Spouse  Prior Functional Level: Ambulatory, Needs Assist with Activities of Daily Living, Uses Walker, Uses Wheelchair  Difficulity with ADLs: Bathing, Dressing, Walking  Difficulity with IADLs: Cooking,  Driving, Laundry, Shopping    Functional Assesment  Prior Functional Level: Ambulatory, Needs Assist with Activities of Daily Living, Uses Walker, Uses Wheelchair    Finances  Financial Barriers to Discharge: No  Prescription Coverage: Yes    Vision / Hearing Impairment  Vision Impairment : No  Hearing Impairment : No         Advance Directive  Advance Directive?: None    Domestic Abuse  Have you ever been the victim of abuse or violence?: No(answers given by patient)  Physical Abuse or Sexual Abuse: No  Verbal Abuse or Emotional Abuse: No  Possible Abuse Reported to:: Not Applicable         Discharge Risks or Barriers  Discharge risks or barriers?: No    Anticipated Discharge Information  Anticipated discharge disposition: [facility], Home  Discharge Address: [address] [address]  Discharge Contact Phone Number: [phone]

## 2019-11-04 NOTE — PROGRESS NOTES
Pt arrives in the late afternoon, with family that has a lot of questions and are very fearful about their loved one's condition.  Over an hour is spent reviewing pt and history with family, and discussing concerns.  Pain is controlled at this time, and blood pressure is decreasing.

## 2019-11-05 PROBLEM — Z71.89 GOALS OF CARE, COUNSELING/DISCUSSION: Status: ACTIVE | Noted: 2019-01-01

## 2019-11-05 NOTE — CARE PLAN
Problem: Communication  Goal: The ability to communicate needs accurately and effectively will improve  Outcome: PROGRESSING AS EXPECTED   Pt. Alert and oriented x 4 at this time and able to communicate needs with staff.   Problem: Safety  Goal: Will remain free from injury  Outcome: PROGRESSING AS EXPECTED   Pt. Calling appropriately for needs and concerns. Bed alarm in place. Hourly rounding in place.

## 2019-11-05 NOTE — PROGRESS NOTES
Hospital Medicine Daily Progress Note    Date of Service  11/5/2019    Chief Complaint  74 y.o. female admitted 11/3/2019 with history of severe RA on chronic prednisone presents after hearing a pop with acute low back pain and inability to ambulate.  CT of the lumbar spine with subacute L4 compression fracture.  She is also found to have a enlarging abdominal aortic aneurysm measuring 3.5 x 3.3 cm.    She is admitted for back pain and compression fracture.    Hospital Course    74 y.o. female admitted 11/3/2019 with history of severe RA on chronic prednisone presents after hearing a pop with acute low back pain and inability to ambulate.  CT of the lumbar spine with subacute L4 compression fracture.  She is also found to have a enlarging abdominal aortic aneurysm measuring 3.5 x 3.3 cm.    She is admitted for back pain and compression fracture.      Interval Problem Update  Patient is sitting up in a chair, improving muscle strength of the lower extremities, still with generalized weakness  Reports improvement in low back pain  Denies any chest pain, shortness of breath, fever or chills  Patient's daughter reports 60 pound weight loss due to poor appetite and pain      We did have an extensive discussion with patient and family at bedside, they would like to continue conservative therapy  They would like to avoid any procedures  I had a discussion with Dr. Betancur, to evaluate  However at this point family is opting against any surgical procedures.  Palliative care to be consulted      11/5: seen and examined, she is still in a lot of back pain, in chair, not wanting to work with pt/ot, refusing snf, or rehab, does not want any surgery or procedures, wants pain control.   Discussed hospice and palliative care, ok with speaking with them will consult now  Consultants/Specialty  Neurosurgery  hospice  Code Status  Full    Disposition  TBD        Review of Systems  Review of Systems   Constitutional: Negative for  chills, diaphoresis, fever and malaise/fatigue.   HENT: Negative for congestion and hearing loss.    Respiratory: Negative for cough, sputum production, shortness of breath and wheezing.    Cardiovascular: Negative for chest pain, palpitations and leg swelling.   Gastrointestinal: Negative for abdominal pain, nausea and vomiting.   Genitourinary: Negative for dysuria and flank pain.   Musculoskeletal: Positive for back pain and myalgias. Negative for falls and joint pain.   Neurological: Negative for dizziness, tremors, sensory change, speech change, focal weakness, seizures, loss of consciousness, weakness and headaches.   Psychiatric/Behavioral: Negative for depression and memory loss. The patient is not nervous/anxious.         Physical Exam  Temp:  [36.5 °C (97.7 °F)-37.4 °C (99.3 °F)] 37.4 °C (99.3 °F)  Pulse:  [63-88] 63  Resp:  [16-20] 16  BP: (101-140)/(59-76) 116/59  SpO2:  [94 %-99 %] 99 %    Physical Exam  Vitals signs and nursing note reviewed.   Constitutional:       General: She is not in acute distress.     Appearance: She is not diaphoretic.      Comments: Frail, elderly, cachectic   HENT:      Head: Normocephalic and atraumatic.      Nose: Nose normal.   Eyes:      General:         Right eye: No discharge.         Left eye: No discharge.      Pupils: Pupils are equal, round, and reactive to light.   Neck:      Musculoskeletal: Neck supple.      Thyroid: No thyromegaly.      Vascular: No JVD.   Cardiovascular:      Rate and Rhythm: Normal rate.      Heart sounds: No murmur.   Pulmonary:      Effort: No respiratory distress.      Breath sounds: Normal breath sounds. No wheezing.   Abdominal:      General: Bowel sounds are normal. There is no distension.      Palpations: Abdomen is soft.      Tenderness: There is no tenderness.   Musculoskeletal:         General: No tenderness.      Comments: Limited, 2/2 pain and fractures   Skin:     General: Skin is warm and dry.      Findings: No erythema or rash.    Neurological:      Mental Status: She is alert and oriented to person, place, and time.      Cranial Nerves: No cranial nerve deficit.      Sensory: No sensory deficit.      Motor: Weakness present.      Coordination: Coordination normal.      Gait: Gait abnormal.      Deep Tendon Reflexes: Reflexes normal.   Psychiatric:         Behavior: Behavior normal.         Thought Content: Thought content normal.         Fluids  No intake or output data in the 24 hours ending 11/05/19 1409    Laboratory  Recent Labs     11/03/19  0854 11/04/19  0330   WBC 8.5 8.7   RBC 4.85 4.80   HEMOGLOBIN 9.5* 9.2*   HEMATOCRIT 36.4* 36.7*   MCV 75.1* 76.5*   MCH 19.6* 19.2*   MCHC 26.1* 25.1*   RDW 52.4* 54.0*   PLATELETCT 237 178   MPV 9.8  --      Recent Labs     11/03/19  0854 11/04/19  0330   SODIUM 137 138   POTASSIUM 3.2* 3.9   CHLORIDE 104 107   CO2 26 25   GLUCOSE 83 86   BUN 14 16   CREATININE 0.60 0.54   CALCIUM 8.9 9.1                   Imaging  CT-ABDOMEN-PELVIS WITH   Final Result   Addendum 1 of 1      11/3/2019 9:56 AM      HISTORY/REASON FOR EXAM:  Abd pain, gastroenteritis or colitis suspected.   Low back pain      TECHNIQUE/EXAM DESCRIPTION:   CT scan of the abdomen and pelvis with    contrast.      Contrast-enhanced helical scanning was obtained from the diaphragmatic    domes through the pubic symphysis following the bolus administration of    nonionic contrast without complication.      70 mL of Omnipaque 350 nonionic contrast was administered without    complication.      Low dose optimization technique was utilized for this CT exam including    automated exposure control and adjustment of the mA and/or kV according to    patient size.      COMPARISON: 11/3/2015      FINDINGS: There is hyperexpansion of the lung bases with mild peripheral    reticulation.      CT Abdomen:   The liver, spleen, adrenal glands, and pancreas are unremarkable.      There is mild intrahepatic and significant extrahepatic ductal  dilatation.    The distal common bile duct measures approximately 12 mm, unchanged. No    definite choledocholithiasis is appreciated. The gallbladder has been    removed.      The kidneys enhance symmetrically. There are bilateral hypodense masses.    The largest mass in the lower pole the left kidney is consistent with a    cyst. Additional masses are indeterminate in measure up to 3.6 cm,    previously 3.2 cm.      The abdominal aorta is markedly ectatic. There are scattered arterial    calcifications. There is aneurysmal dilatation with peripheral low-density    plaque. Maximum diameters are approximately 3.7 x 3.3 cm. There is dense    calcification in the common iliac    arteries with bilateral stents. A thrombosed left common iliac artery    aneurysm is peripheral to the stent.      There is a large amount of colonic stool. No significant bowel wall    thickening is identified. The appendix is not identified. No    pneumoperitoneum.      CT Pelvis:   The bladder is unremarkable.      No significant free fluid or adenopathy is identified.      Multilevel compression deformities are again noted and will be described    on separate lumbar spine CT. The bones are osteopenic.         1.  No acute intra-abdominal findings.   2.  Large amount of colonic stool.   3.  Severe atherosclerotic disease. Infrarenal abdominal aortic aneurysm    has increased in size from the prior exam.   4.  Stable extrahepatic ductal dilatation.   5.  Bilateral renal masses, likely hyperdense/proteinaceous cyst. Neoplasm    cannot be excluded. If clinically indicated, follow-up renal protocol CT    or MRI is recommended for further evaluation.      Final      CT-LSPINE W/O PLUS RECONS   Final Result      1.  Compression deformity involving the L4 vertebral body has worsened from the prior CT and is likely subacute.      2.  Additional compression deformities are unchanged.      3.  Osteopenia           Assessment/Plan  * Compression  fracture of L4 vertebra (HCC)- (present on admission)  Assessment & Plan  Patient has advanced osteoporosis that is a result of years of steroid use  Presents with L4 compression fracture and intractable pain, subacute  Admit for pain control  I ordered calcitonin for acute pain of compression fracture  Calcium and vitamin D  PT/OT    11/4 consulted neurosurgery, Dr. Betancur, may benefit from IR decompressive procedure  Now with inability to ambulate, slightly improving muscle strength today  Pain control  PT/OT evaluation    Family would like to continue conservative therapy  Patient is somewhat showing signs of improvement in back pain and lower extremity strength  Lidocaine patch trial  PT/OT  Pain control  Palliative care consult    11/5: still with increased pain, does not want surgery, pt/ot or rehab, wants pain control and home dc  Will have palliative and hospice care see her, she was on hospice in the past per her     Rheumatoid arthritis involving multiple sites with positive rheumatoid factor (CMS-HCC)- (present on admission)  Assessment & Plan  Chronic rheumatoid arthritis  She has not tolerated DMARDS in the past  Continue home dose of prednisone, 5mg daily    Essential hypertension- (present on admission)  Assessment & Plan  Uncontrolled hypertension in the emergency room  Likely related to pain  Continue diltiazem and lisinopril    Goals of care, counseling/discussion  Assessment & Plan  I spent >40mins discussing goals of care with patient and her family. She is refusing any intervention and also pt/ot. She is more interested in pain control and wants to go home, we discussed options of palliative care and hospice. At this time patient and family are interested in further discussions with them  We also discussed code status and patient wishes to be DNR,   I will consult hospice now    Abdominal aortic aneurysm (AAA) without rupture ([facility])- (present on admission)  Assessment & Plan  Patient has  AAA that has increased in size to 3.7cm  Discussed need for outpatient follow up with patient and her daughter    Severe protein-calorie malnutrition (HCC)- (present on admission)  Assessment & Plan  Clinical diagnosis based on cachectic appearance and low BMI    Hypokalemia- (present on admission)  Assessment & Plan  resolved       VTE prophylaxis: Lovenox

## 2019-11-05 NOTE — CONSULTS
Palliative Care Social Work Assessment    Reason for PC Consult: Advance Care Planning  Consulted by: Dr. Kamini Najera    General/Reason for admission: Pt stated that the pt had lifted a table while she was home alone and heard a loud pop.  Pt fell back onto the couch.  She was unable to move and waiting until her  came home.  Pt was hoping that the pain would subside and did not come to the hospital until Monday.  Pt had a CT scan which showed L4 compression fracture. Pt reports that she has rheumatoid arthritis and osteoporosis.      Affect: Alert    Social: Good   Pt resides with her spouse, Nico.  Pt has a son and daughter that reside here locally.  Pt reports to having a lot of support in the community.   Financial resources: Adequate     : No      Muslim/Spirituality:   Is Druze or spirituality important for coping with this illness?  Pt reports that she is Catholic.       Has a  or spiritual provider visit been requested?  No, pt declined.     Other sources of spirituality: NA     Palliative Performance Scale: 50%    Advance Directive: None-    DPOA: No-    POLST: No-  Yes, completed on this admission.     Code Status: Full-  DNR    Outcome:   LSW met with pt at bedside.  Pt reports that she heard a pop in her back after lifting up a table.  Pt waited several days to come into the hospital.  She was hopeful that the pain would go away, but unfortunately it did not.  Pt was admitted on 11/3/19.  Pt has been diagnosed with rheumatoid arthritis and has been dealing with this for the past 19 years.  Pt also has osteoporosis.  Pt explained that her quality of life has decreased in the past 2 years.  Pt reports that she is unable to go out and do basic activities such as; shopping for clothes or groceries.  Pt became tearful as she explained how her RA has limited her day to day activities.  Pt is worried about what her new baseline will be.  She would like to be able to use a FWW again,  "but reports that the staff won't let her use it in the hospital.  It appears that PT/OT evaluation is pending.  Pt was previously able to use a front wheel walker, wheel chair, or move independently depending on how she is feeling.  Pt denied seeing a pain specialist, but her pain is managed through her primary care physician, Dr. Mariella Padilla.  Pt reports that Dr. Padilla knows her very well and has been doing a good job managing her pain.  Pt confirmed that she does not want any surgical interventions done.  Pt reports that \"she does not believe in surgery\" and that her back is \"broken\".  LSW offered emotional support.     LSW asked pt if she would like to complete and AD, pt stated that she had completed one already.  Pt reports that, the hospital should have it.  LSW explained that it is not in her chart.  Pt declined to complete a new one.  LSW went over the POLST form.  Pt selected to be DNR, selective treatment, and no artificial nutrition or feeding tube.           PC GEORGE Tejeda discussed with Dr. Irizarry about pt seeing a pain specialist once dc'd from the hospital.     Follow up (if needed): None  Discussed w/ Dr. Irizarry and GEORGE Silva     Thank you for allowing Palliative Care to participate in this patient's care. Please feel free to call [phone] with any questions or concerns.    "

## 2019-11-05 NOTE — PALLIATIVE CARE
Palliative Care follow-up  Consult received and EMR reviewed; case discussed with MD noting need for PC to f/u regarding AD, code status and possible POLST. Pt with chronic back pain and compression fractures, desiring no more invasive procedures. Plan is to return home with therapy. Formal consult to address ACP to follow.    Plan: formal consult to follow    Thank you for allowing Palliative Care to participate in this patient's care. Please feel free to call [phone] with any questions or concerns.

## 2019-11-05 NOTE — ASSESSMENT & PLAN NOTE
I spent >40mins discussing goals of care with patient and her family. She is refusing any intervention and also pt/ot. She is more interested in pain control and wants to go home, we discussed options of palliative care and hospice. At this time patient and family are interested in further discussions with them  We also discussed code status and patient wishes to be DNR,   I will consult hospice now

## 2019-11-06 NOTE — CARE PLAN
Problem: Safety  Goal: Will remain free from injury  Note:   Bed locked and in lowest position. Call light and personal belongings in reach. Bed alarm in place. Hourly rounding.       Problem: Skin Integrity  Goal: Risk for impaired skin integrity will decrease  Note:   Waffle overlay mattress in use. Q2 turns. Pillows in use for support and positioning. Barrier cream in use.

## 2019-11-06 NOTE — PROGRESS NOTES
Assumed care of pt at 0700. Pt AAOx3-4, occasionally disoriented to time. Fall precautions in place. Family at bedside this morning and was upset about pain management and plan of care. Charge RN notified. New orders for pain management received. Pt stated that pain was more controlled this afternoon with the PRN morphine that was given. Pt at risk for skin breakdown, Mepilex applied to sacrum and bilateral elbows. Attempted to plan waffle cushion on chair that pt was sitting in and daughter stated that she did not want it because it increased back pain. Education provided on importance of pressure injury prevention and continued to decline use.     1500: Report given to GEORGE Diaz who is assuming care of pt.

## 2019-11-06 NOTE — PROGRESS NOTES
[facility] Medicine Daily Progress Note    Date of Service  11/6/2019    Chief Complaint  74 y.o. female admitted 11/3/2019 with history of severe RA on chronic prednisone presents after hearing a pop with acute low back pain and inability to ambulate.  CT of the lumbar spine with subacute L4 compression fracture.  She is also found to have a enlarging abdominal aortic aneurysm measuring 3.5 x 3.3 cm.    She is admitted for back pain and compression fracture.    Hospital Course    74 y.o. female admitted 11/3/2019 with history of severe RA on chronic prednisone presents after hearing a pop with acute low back pain and inability to ambulate.  CT of the lumbar spine with subacute L4 compression fracture.  She is also found to have a enlarging abdominal aortic aneurysm measuring 3.5 x 3.3 cm.    She is admitted for back pain and compression fracture.      Interval Problem Update  Patient is sitting up in a chair, improving muscle strength of the lower extremities, still with generalized weakness  Reports improvement in low back pain  Denies any chest pain, shortness of breath, fever or chills  Patient's daughter reports 60 pound weight loss due to poor appetite and pain      We did have an extensive discussion with patient and family at bedside, they would like to continue conservative therapy  They would like to avoid any procedures  I had a discussion with Dr. Betancur, to evaluate  However at this point family is opting against any surgical procedures.  Palliative care to be consulted      11/5: seen and examined, she is still in a lot of back pain, in chair, not wanting to work with pt/ot, refusing snf, or rehab, does not want any surgery or procedures, wants pain control.   Discussed hospice and palliative care, ok with speaking with them will consult now    11/6: seen and patient is sitting in chair and crying in pain,  at bedside, updated her about the fact there is no palliative care outpatient and since she  does not want any t/m, they will need to get an outpatien pain specialist.  At this time we need to figure out a good pain regimen for her however  Consultants/Specialty  Neurosurgery  hospice  Code Status  Full    Disposition  TBD        Review of Systems  Review of Systems   Constitutional: Negative for chills, diaphoresis, fever and malaise/fatigue.   HENT: Negative for congestion and hearing loss.    Respiratory: Negative for cough, sputum production, shortness of breath and wheezing.    Cardiovascular: Negative for chest pain, palpitations and leg swelling.   Gastrointestinal: Negative for abdominal pain, nausea and vomiting.   Genitourinary: Negative for dysuria and flank pain.   Musculoskeletal: Positive for back pain (severe) and myalgias. Negative for falls and joint pain.   Neurological: Negative for dizziness, tremors, sensory change, speech change, focal weakness, seizures, loss of consciousness, weakness and headaches.   Psychiatric/Behavioral: Negative for depression and memory loss. The patient is not nervous/anxious.         Physical Exam  Temp:  [36.7 °C (98 °F)-37.6 °C (99.7 °F)] 37.6 °C (99.7 °F)  Pulse:  [60-80] 80  Resp:  [16] 16  BP: (125-162)/(53-73) 162/61  SpO2:  [97 %-100 %] 97 %    Physical Exam  Vitals signs and nursing note reviewed.   Constitutional:       General: She is not in acute distress.     Appearance: She is not diaphoretic.      Comments: Frail, elderly, cachectic   HENT:      Head: Normocephalic and atraumatic.      Nose: Nose normal.   Eyes:      General:         Right eye: No discharge.         Left eye: No discharge.      Pupils: Pupils are equal, round, and reactive to light.   Neck:      Musculoskeletal: Neck supple.      Thyroid: No thyromegaly.      Vascular: No JVD.   Cardiovascular:      Rate and Rhythm: Normal rate.      Heart sounds: No murmur.   Pulmonary:      Effort: No respiratory distress.      Breath sounds: Normal breath sounds. No wheezing.   Abdominal:       General: Bowel sounds are normal. There is no distension.      Palpations: Abdomen is soft.      Tenderness: There is no tenderness.   Musculoskeletal:         General: Tenderness present.      Comments: Limited, 2/2 pain and fractures   Skin:     General: Skin is warm and dry.      Findings: No erythema or rash.   Neurological:      Mental Status: She is alert and oriented to person, place, and time.      Cranial Nerves: No cranial nerve deficit.      Sensory: No sensory deficit.      Motor: Weakness present.      Coordination: Coordination normal.      Gait: Gait abnormal.      Deep Tendon Reflexes: Reflexes normal.   Psychiatric:         Behavior: Behavior normal.         Thought Content: Thought content normal.         Fluids  No intake or output data in the 24 hours ending 11/06/19 1131    Laboratory  Recent Labs     11/04/19  0330   WBC 8.7   RBC 4.80   HEMOGLOBIN 9.2*   HEMATOCRIT 36.7*   MCV 76.5*   MCH 19.2*   MCHC 25.1*   RDW 54.0*   PLATELETCT 178     Recent Labs     11/04/19  0330   SODIUM 138   POTASSIUM 3.9   CHLORIDE 107   CO2 25   GLUCOSE 86   BUN 16   CREATININE 0.54   CALCIUM 9.1                   Imaging  CT-ABDOMEN-PELVIS WITH   Final Result   Addendum 1 of 1      11/3/2019 9:56 AM      HISTORY/REASON FOR EXAM:  Abd pain, gastroenteritis or colitis suspected.   Low back pain      TECHNIQUE/EXAM DESCRIPTION:   CT scan of the abdomen and pelvis with    contrast.      Contrast-enhanced helical scanning was obtained from the diaphragmatic    domes through the pubic symphysis following the bolus administration of    nonionic contrast without complication.      70 mL of Omnipaque 350 nonionic contrast was administered without    complication.      Low dose optimization technique was utilized for this CT exam including    automated exposure control and adjustment of the mA and/or kV according to    patient size.      COMPARISON: 11/3/2015      FINDINGS: There is hyperexpansion of the lung bases with  mild peripheral    reticulation.      CT Abdomen:   The liver, spleen, adrenal glands, and pancreas are unremarkable.      There is mild intrahepatic and significant extrahepatic ductal dilatation.    The distal common bile duct measures approximately 12 mm, unchanged. No    definite choledocholithiasis is appreciated. The gallbladder has been    removed.      The kidneys enhance symmetrically. There are bilateral hypodense masses.    The largest mass in the lower pole the left kidney is consistent with a    cyst. Additional masses are indeterminate in measure up to 3.6 cm,    previously 3.2 cm.      The abdominal aorta is markedly ectatic. There are scattered arterial    calcifications. There is aneurysmal dilatation with peripheral low-density    plaque. Maximum diameters are approximately 3.7 x 3.3 cm. There is dense    calcification in the common iliac    arteries with bilateral stents. A thrombosed left common iliac artery    aneurysm is peripheral to the stent.      There is a large amount of colonic stool. No significant bowel wall    thickening is identified. The appendix is not identified. No    pneumoperitoneum.      CT Pelvis:   The bladder is unremarkable.      No significant free fluid or adenopathy is identified.      Multilevel compression deformities are again noted and will be described    on separate lumbar spine CT. The bones are osteopenic.         1.  No acute intra-abdominal findings.   2.  Large amount of colonic stool.   3.  Severe atherosclerotic disease. Infrarenal abdominal aortic aneurysm    has increased in size from the prior exam.   4.  Stable extrahepatic ductal dilatation.   5.  Bilateral renal masses, likely hyperdense/proteinaceous cyst. Neoplasm    cannot be excluded. If clinically indicated, follow-up renal protocol CT    or MRI is recommended for further evaluation.      Final      CT-LSPINE W/O PLUS RECONS   Final Result      1.  Compression deformity involving the L4 vertebral  body has worsened from the prior CT and is likely subacute.      2.  Additional compression deformities are unchanged.      3.  Osteopenia           Assessment/Plan  * intractable back pain  Persistent likely 2/2 Patient has advanced osteoporosis that is a result of years of steroid use  Presents with L4 compression fracture and intractable pain, subacute  She is on oxycodone q 4 hours, I will add morphine 1 mg q4  Lidocaine patch  Hoping palliative care can see patient and recs pain control    Compression fracture of L4 vertebra (formerly [facility])- (present on admission)  Assessment & Plan  Patient has advanced osteoporosis that is a result of years of steroid use  Presents with L4 compression fracture and intractable pain, subacute  Admit for pain control  calcitonin for acute pain of compression fracture  Calcium and vitamin D  PT/OT    NSg consulted and recs for kyphoplast, however patient refused any t/m intervention  Family would like to continue conservative therapy  Lidocaine patch trial  Pain control  Palliative care consult      Rheumatoid arthritis involving multiple sites with positive rheumatoid factor (CMS-formerly [facility])- (present on admission)  Assessment & Plan  Chronic rheumatoid arthritis  She has not tolerated DMARDS in the past  Continue home dose of prednisone, 5mg daily    Essential hypertension- (present on admission)  Assessment & Plan  Uncontrolled hypertension in the emergency room  Likely related to pain  Continue diltiazem and lisinopril    Goals of care, counseling/discussion  Assessment & Plan  I spent >40mins discussing goals of care with patient and her family. She is refusing any intervention and also pt/ot. She is more interested in pain control and wants to go home, we discussed options of palliative care and hospice. At this time patient and family are interested in further discussions with them  We also discussed code status and patient wishes to be DNR,   I will consult hospice now    Abdominal aortic  aneurysm (AAA) without rupture (HCC)- (present on admission)  Assessment & Plan  Patient has AAA that has increased in size to 3.7cm  Discussed need for outpatient follow up with patient and her daughter    Severe protein-calorie malnutrition (HCC)- (present on admission)  Assessment & Plan  Clinical diagnosis based on cachectic appearance and low BMI    Hypokalemia- (present on admission)  Assessment & Plan  resolved       VTE prophylaxis: Lovenox    Control pain, with iv pain meds and adjustment

## 2019-11-06 NOTE — CARE PLAN
Problem: Nutritional:  Goal: Achieve adequate nutritional intake  Description  Patient will consume 50% of meals.  Outcome: PROGRESSING AS EXPECTED     PO % x 4 meals per chart review. Pt reports not consuming Boost Plus BID and would like for them to not be sent.

## 2019-11-07 NOTE — PROGRESS NOTES
[facility] Medicine Daily Progress Note    Date of Service  11/7/2019    Chief Complaint  74 y.o. female admitted 11/3/2019 with history of severe RA on chronic prednisone presents after hearing a pop with acute low back pain and inability to ambulate.  CT of the lumbar spine with subacute L4 compression fracture.  She is also found to have a enlarging abdominal aortic aneurysm measuring 3.5 x 3.3 cm.    She is admitted for back pain and compression fracture.    Hospital Course    74 y.o. female admitted 11/3/2019 with history of severe RA on chronic prednisone presents after hearing a pop with acute low back pain and inability to ambulate.  CT of the lumbar spine with subacute L4 compression fracture.  She is also found to have a enlarging abdominal aortic aneurysm measuring 3.5 x 3.3 cm.    She is admitted for back pain and compression fracture.      Interval Problem Update  Patient is sitting up in a chair, improving muscle strength of the lower extremities, still with generalized weakness  Reports improvement in low back pain  Denies any chest pain, shortness of breath, fever or chills  Patient's daughter reports 60 pound weight loss due to poor appetite and pain      We did have an extensive discussion with patient and family at bedside, they would like to continue conservative therapy  They would like to avoid any procedures  I had a discussion with Dr. Betancur, to evaluate  However at this point family is opting against any surgical procedures.  Palliative care to be consulted      11/5: seen and examined, she is still in a lot of back pain, in chair, not wanting to work with pt/ot, refusing snf, or rehab, does not want any surgery or procedures, wants pain control.   Discussed hospice and palliative care, ok with speaking with them will consult now    11/6: seen and patient is sitting in chair and crying in pain,  at bedside, updated her about the fact there is no palliative care outpatient and since she  "does not want any t/m, they will need to get an outpatien pain specialist.  At this time we need to figure out a good pain regimen for her however    11/7: pain is still not controlled, she has adequate amount of pain meds on board along with iv morphine, still in 10/10 pain, does not want to get out of bed, angry this morning, family at bedside, patient states \" I just want to lay here and die\"  Daughter asked about assisted suicide, I discussed we don't do that here    Will adjust pain regimen, switch to ms contin  Monitor closely    Consultants/Specialty  Neurosurgery  hospice  Code Status  Full    Disposition  TBD        Review of Systems  Review of Systems   Constitutional: Negative for chills, diaphoresis, fever and malaise/fatigue.   HENT: Negative for congestion and hearing loss.    Respiratory: Negative for cough, sputum production, shortness of breath and wheezing.    Cardiovascular: Negative for chest pain, palpitations and leg swelling.   Gastrointestinal: Negative for abdominal pain, nausea and vomiting.   Genitourinary: Negative for dysuria and flank pain.   Musculoskeletal: Positive for back pain (severe) and myalgias. Negative for falls and joint pain.   Neurological: Negative for dizziness, tremors, sensory change, speech change, focal weakness, seizures, loss of consciousness, weakness and headaches.   Psychiatric/Behavioral: Negative for depression and memory loss. The patient is not nervous/anxious.         Physical Exam  Temp:  [36.2 °C (97.2 °F)-37.6 °C (99.7 °F)] 36.4 °C (97.5 °F)  Pulse:  [66-72] 69  Resp:  [16] 16  BP: (112-152)/(49-76) 141/58  SpO2:  [95 %-100 %] 95 %    Physical Exam  Vitals signs and nursing note reviewed.   Constitutional:       General: She is not in acute distress.     Appearance: She is not diaphoretic.      Comments: Frail, elderly, cachectic   HENT:      Head: Normocephalic and atraumatic.      Nose: Nose normal.   Eyes:      General:         Right eye: No " discharge.         Left eye: No discharge.      Pupils: Pupils are equal, round, and reactive to light.   Neck:      Musculoskeletal: Neck supple.      Thyroid: No thyromegaly.      Vascular: No JVD.   Cardiovascular:      Rate and Rhythm: Normal rate.      Heart sounds: No murmur.   Pulmonary:      Effort: No respiratory distress.      Breath sounds: Normal breath sounds. No wheezing.   Abdominal:      General: Bowel sounds are normal. There is no distension.      Palpations: Abdomen is soft.      Tenderness: There is no tenderness.   Musculoskeletal:         General: Tenderness present.      Comments: Limited, 2/2 pain and fractures   Skin:     General: Skin is warm and dry.      Findings: No erythema or rash.   Neurological:      Mental Status: She is alert and oriented to person, place, and time.      Cranial Nerves: No cranial nerve deficit.      Sensory: No sensory deficit.      Motor: Weakness present.      Coordination: Coordination normal.      Gait: Gait abnormal.      Deep Tendon Reflexes: Reflexes normal.   Psychiatric:         Behavior: Behavior normal.         Thought Content: Thought content normal.         Fluids  No intake or output data in the 24 hours ending 11/07/19 0848    Laboratory                        Imaging  CT-ABDOMEN-PELVIS WITH   Final Result   Addendum 1 of 1      11/3/2019 9:56 AM      HISTORY/REASON FOR EXAM:  Abd pain, gastroenteritis or colitis suspected.   Low back pain      TECHNIQUE/EXAM DESCRIPTION:   CT scan of the abdomen and pelvis with    contrast.      Contrast-enhanced helical scanning was obtained from the diaphragmatic    domes through the pubic symphysis following the bolus administration of    nonionic contrast without complication.      70 mL of Omnipaque 350 nonionic contrast was administered without    complication.      Low dose optimization technique was utilized for this CT exam including    automated exposure control and adjustment of the mA and/or kV  according to    patient size.      COMPARISON: 11/3/2015      FINDINGS: There is hyperexpansion of the lung bases with mild peripheral    reticulation.      CT Abdomen:   The liver, spleen, adrenal glands, and pancreas are unremarkable.      There is mild intrahepatic and significant extrahepatic ductal dilatation.    The distal common bile duct measures approximately 12 mm, unchanged. No    definite choledocholithiasis is appreciated. The gallbladder has been    removed.      The kidneys enhance symmetrically. There are bilateral hypodense masses.    The largest mass in the lower pole the left kidney is consistent with a    cyst. Additional masses are indeterminate in measure up to 3.6 cm,    previously 3.2 cm.      The abdominal aorta is markedly ectatic. There are scattered arterial    calcifications. There is aneurysmal dilatation with peripheral low-density    plaque. Maximum diameters are approximately 3.7 x 3.3 cm. There is dense    calcification in the common iliac    arteries with bilateral stents. A thrombosed left common iliac artery    aneurysm is peripheral to the stent.      There is a large amount of colonic stool. No significant bowel wall    thickening is identified. The appendix is not identified. No    pneumoperitoneum.      CT Pelvis:   The bladder is unremarkable.      No significant free fluid or adenopathy is identified.      Multilevel compression deformities are again noted and will be described    on separate lumbar spine CT. The bones are osteopenic.         1.  No acute intra-abdominal findings.   2.  Large amount of colonic stool.   3.  Severe atherosclerotic disease. Infrarenal abdominal aortic aneurysm    has increased in size from the prior exam.   4.  Stable extrahepatic ductal dilatation.   5.  Bilateral renal masses, likely hyperdense/proteinaceous cyst. Neoplasm    cannot be excluded. If clinically indicated, follow-up renal protocol CT    or MRI is recommended for further  evaluation.      Final      CT-LSPINE W/O PLUS RECONS   Final Result      1.  Compression deformity involving the L4 vertebral body has worsened from the prior CT and is likely subacute.      2.  Additional compression deformities are unchanged.      3.  Osteopenia           Assessment/Plan  * intractable back pain  Persistent likely 2/2 Patient has advanced osteoporosis that is a result of years of steroid use  Presents with L4 compression fracture and intractable pain, subacute  She was on oxycodone q 4 hours, morphine 1 mg q4  Lidocaine patch  Palliative care to evaluate for pain management  She will need continue outpatient pain management, referral made already by her PCP    Compression fracture of L4 vertebra (HCC)- (present on admission)  Assessment & Plan  Patient has advanced osteoporosis that is a result of years of steroid use  Presents with L4 compression fracture and intractable pain, subacute  Admit for pain control  calcitonin for acute pain of compression fracture  Calcium and vitamin D  PT/OT    NSG consulted and recs for kyphoplast, however patient refused any t/m intervention  Family would like to continue conservative therapy  Lidocaine patch trial  Pain control    norco 10 [id number] for PRN? Palliative care to assist  MS contin does not work well with her per her family        Rheumatoid arthritis involving multiple sites with positive rheumatoid factor (CMS-[facility])- (present on admission)  Assessment & Plan  Chronic rheumatoid arthritis  She has not tolerated DMARDS in the past  Continue home dose of prednisone, 5mg daily    Essential hypertension- (present on admission)  Assessment & Plan  Uncontrolled hypertension in the emergency room  Likely related to pain  Continue diltiazem and lisinopril    Goals of care, counseling/discussion  Assessment & Plan  I spent >40mins discussing goals of care with patient and her family. She is refusing any intervention and also pt/ot. She is more interested in  pain control and wants to go home, we discussed options of palliative care and hospice. At this time patient and family are interested in further discussions with them  We also discussed code status and patient wishes to be DNR,       Abdominal aortic aneurysm (AAA) without rupture (HCC)- (present on admission)  Assessment & Plan  Patient has AAA that has increased in size to 3.7cm  Discussed need for outpatient follow up with patient and her daughter    Severe protein-calorie malnutrition (HCC)- (present on admission)  Assessment & Plan  Clinical diagnosis based on cachectic appearance and low BMI    Hypokalemia- (present on admission)  Assessment & Plan  resolved       VTE prophylaxis: Lovenox    Control pain, with iv pain meds and adjustment

## 2019-11-07 NOTE — PROGRESS NOTES
Pt AAOx4, Nilo, generalized weakness noted. Back pain increased with movement or turning. Pain meds given per MAR. Incontinent of bladder, purewick in place. Pt refusing overlay waffle cushion due to increased discomfort with it previously. Pt declining to get OOB this am. VSS. Family at bedside.

## 2019-11-07 NOTE — PALLIATIVE CARE
Palliative Care follow-up  PC RN met with Lakisha and her family at  after discussing case with PCP Dr. Padilla and BS team. Lakisha states that when she was able to take 10mg/325mg norco, her pain was controlled well enough for her to be able to enjoy her days. PC RN explained the role of this team to help with acute pain related to terminal conditions, but offered to brainstorm ideas with the PC MD and Dr. Padilla. After speaking with Dr. Diaz, recommendation made to reach out to the geriatrics team to discuss. PC RN spoke with Dr. Jean-Baptiste, who recommended PC evaluation of pain management with incorporation of an effective regime, as well as further discussion with PCP for a referral to the [facility] who can address polypharmacy and pain management with Lakisha. He states the goal is to develop a plan to be carried out by Dr. Padilla. Discussed further with Dr. Diaz who will assist with any questions from the PCP regarding the guidelines with working with the outpatient regime.    PC MD will see Lakisha 11/7 and initiate a regime. Inbasket message to Dr. Padilla re: this plan.    Updated: Dr. Irizarry      Thank you for allowing Palliative Care to participate in this patient's care. Please feel free to call [phone] with any questions or concerns.

## 2019-11-07 NOTE — PROGRESS NOTES
Referral to [facility] suggested by palliative care and also by Dr. Diaz.  I have placed that referral.  Palliative care will come up with a discharge program.  The pain medication prescription is likely to be higher than the CDC guidelines but this is an exceptional case with severe pain from multiple triggers including fracture and severe rheumatoid arthritis.  The rheumatoid arthritis has been refractory to treatment, patient has been unable to tolerate multiple treatments that have been tried by rheumatology.

## 2019-11-07 NOTE — PROGRESS NOTES
Palliative Care   Received consult order for Non-Chronic Pain Management. Reviewed chart and discussed case with Dr. Slaughter. Patient has ample PRN opioids available, including IV morphine.  Full Palliative Care consult for pain management to follow tomorrow.     DICK Calderon, [facility]  Palliative Care Nurse Practitioner  [phone]

## 2019-11-07 NOTE — PROGRESS NOTES
Palliative Care   Discussed case with Dr. Irziarry and made recommendations regarding pain management.      EARLE Phillips.  Palliative Care Nurse Practitioner  [phone]

## 2019-11-07 NOTE — CARE PLAN
Problem: Safety  Goal: Will remain free from injury  Outcome: PROGRESSING AS EXPECTED     Problem: Pain Management  Goal: Pain level will decrease to patient's comfort goal  Outcome: PROGRESSING SLOWER THAN EXPECTED   Bed alarm in place, pain meds given per MAR

## 2019-11-07 NOTE — PROGRESS NOTES
Medium TLSO fitted to pt and left at bedside for future use. Any further questions or assistance please call ortho tech at [phone].

## 2019-11-07 NOTE — PROGRESS NOTES
Assumed Pt's care at 1500.  Pt sitting in chair.  Pt comfortable and wants to stay in the chair.  Pt's dtr states pt smokes three packs per day and is requesting a nicotine patch.  Dr. Irizarry made aware and she ordered a nicotine patch.  Will continue to monitor pt.

## 2019-11-07 NOTE — CARE PLAN
Problem: Communication  Goal: The ability to communicate needs accurately and effectively will improve  11/6/2019 2210 by Alis Garcia R.N.  Outcome: PROGRESSING AS EXPECTED  11/6/2019 1932 by Alis Garcia R.N.  Outcome: PROGRESSING AS EXPECTED     Problem: Safety  Goal: Will remain free from injury  11/6/2019 2210 by Alis Garcia R.N.  Outcome: PROGRESSING AS EXPECTED  11/6/2019 1932 by FAHAD BlockNYoung  Outcome: PROGRESSING AS EXPECTED  Goal: Will remain free from falls  11/6/2019 2210 by FAHAD BlockNYoung  Outcome: PROGRESSING AS EXPECTED  11/6/2019 1932 by FAHAD BlockNYoung  Outcome: PROGRESSING AS EXPECTED

## 2019-11-07 NOTE — CONSULTS
"Patient seen and examined with the resident Dr. Naik and I agree with POC below.  Donna MERIDA Sukhjinder, DO      Date & Time note created:    11/7/2019   11:10 AM       Date of Consult: 11/7/2019    Requesting Provider: Vanessa Irizarry M.D.  Consulting Provider: Emmanuel Naik  Reason for Consult: Pain management    Chief Complaint: acute on chronic low back pain  HPI:   Lakisha Bhatti is a 74 y.o. female with a history of seropositive rheumatoid arthritis, chronic back pain, osteopenia, chronic opiate use, and peripheral arterial disease, presents for one day of low back pain after lifting a table the day before admission. She was found to have an L4 compression fracture on CT. Neurosurgery was consulted however the patient declined MRI or any surgical interventions. Has taken steroids for 30 years, last DEXA 2013 showed osteopenia. Normally takes hydrocodone-acetaminophen  mg two tabs at a time at home every 4 hours which allowed ambulation/activity for approximately 15 minutes before having to stop at baseline. Has tried tramadol in the past with no relief, never tried fentanyl patch, gabapentin and meloxicam were \"too hard to take,\" MS Devin was too sedating. Has not been seeing a pain specialist because regimen was handled by primary care in the past, saw a pain specialist remotely but doesn't remember who, not interested in interventional pain management. Lives in a single story house with her  who is retired, one step to enter house, has walker, wheelchair, and shower chair at home. Has had weight loss of 70 lbs over the last 2.5 years per patient. Patient's current goal is to be able to move more with less pain. Patient was open to exploring hospice services outpatient but wants to talk to her family more before discussing further. No bowel movement for 3 days.    Pain History  Onset: 11/2/19  Location: low back  Duration: was constant, no pain currently  Characteristics: sharp and " "burning  Aggravating factors: walking, movement  Alleviating factors: analgesic medications  Radiation: none  Treatments: hydrocodone, morphine  Severity: was 10/10, now 0/10     Home Medication Regimen:  Home Medications     Reviewed by Kasia Villeda (Pharmacy Tech) on 11/03/19 at 1108  Med List Status: Complete   Medication Last Dose Status   aspirin  MG Tablet Delayed Response 11/1/2019 Active   atorvastatin (LIPITOR) 40 MG Tab 11/1/2019 Active   clopidogrel (PLAVIX) 75 MG Tab 11/1/2019 Active   DILTIAZem CD (CARTIA XT) 180 MG CAPSULE SR 24 HR 11/1/2019 Active   HYDROcodone/acetaminophen (NORCO)  MG Tab 11/3/2019 Active   lisinopril (PRINIVIL, ZESTRIL) 40 MG tablet 11/1/2019 Active   predniSONE (DELTASONE) 5 MG Tab 11/1/2019 Active   traZODone (DESYREL) 50 MG Tab unknown Active              Past Medical History:   Diagnosis Date   • Acute ischemic stroke ([facility]) 1/2010   • Anesthesia     ponv   • ATHEROSCLEROSIS    • Bleeding disorder ([facility])     prolonged bleeding   • Blood clotting disorder ([facility])     12/2015   • Breath shortness     since    • Carotid artery stenosis     Dr. Silver   • CATARACT     Removed 2000   • Dental disorder     upper and lower dentures   • Dyslipidemia, goal LDL below 100    • Emphysema of lung ([facility])     No oxygen use at this time.   • Fibromyalgia    • High cholesterol    • History of cerebrovascular accident with residual effects 1/2010    some memory loss   • Hypertension 2015    pt states its been running high pt is very nervous about upcoming procedure   • Iliac artery stenosis, right ([facility])    • Indigestion    • Iron deficiency anemia, unspecified    • MEDICAL HOME 10/10/2012   • Osteoarthritis    • OSTEOPOROSIS    • Pain     severe pain in back   • RA (rheumatoid arthritis) ([facility])    • Rheumatoid arthritis(714.0) 1997    Dr. Goodwin   • Stroke ([facility]) 2010    no residual weakness   • Stroke ([facility]) 2016    May 2016 X2 strokes.  \"Sometimes has trouble putting " "thoughts together\".   • TIA (transient ischemic attack)    • Tubular adenoma of colon     Dr. Velazquez   • Vasculitis of the skin     left leg   • Vertebral fracture, osteoporotic (HCC)     several   • Vitamin d deficiency      Past Surgical History:   Procedure Laterality Date   • GASTROSCOPY-ENDO N/A 1/18/2016    Procedure: GASTROSCOPY-ENDO;  Surgeon: Pro Moreira M.D.;  Location: [facility];  Service:    • COLONOSCOPY - ENDO N/A 1/18/2016    Procedure: COLONOSCOPY - ENDO;  Surgeon: Pro Moreira M.D.;  Location: [facility];  Service:    • GASTROSCOPY WITH BIOPSY N/A 1/18/2016    Procedure: GASTROSCOPY WITH BIOPSY;  Surgeon: Pro Moreira M.D.;  Location: [facility];  Service:    • THROMBECTOMY Left 12/22/2015    Procedure: LEFT UPPER EXTREMITY BRACHIAL  THROMBECTOMY;  Surgeon: Presley Allison M.D.;  Location: [facility];  Service:    • RECOVERY  12/10/2015    Procedure: VASCULAR CASE-STEVIE-ABDOMINAL AORTOGRAM WITH RUNOFF/VISCERAL ANGIOGRAM 94084, 99096, 60286, 11489- ABDOMINAL PAIN POSSIBLE MESENTERIC ARTERY ISCHEMIA, ATHEROSCLEROSIS OF AORTA I70.0, R10.9;  Surgeon: [facility];  Location: SURGERY PRE-POST PROC UNIT [facility] [place];  Service:    • CYSTOCELE REPAIR  1/27/2012    Performed by PADMINI RAM at [facility]   • RECTOCELE REPAIR  1/27/2012    Performed by PADMINI RAM at [facility]   • CAROTID ENDARTERECTOMY  2/18/2010    left   • CATARACT PHACO WITH IOL      b/l   • CHOLECYSTECTOMY     • INCISION HERNIA REPAIR      with bladder repair   • VAGINAL HYSTERECTOMY TOTAL      Hysterectomy,Total Vaginal     Current Medications:  No current facility-administered medications on file prior to encounter.      Current Outpatient Medications on File Prior to Encounter   Medication Sig Dispense Refill   • HYDROcodone/acetaminophen (NORCO)  MG Tab Take 1 Tab by mouth 5 Times a Day.     • traZODone (DESYREL) 50 MG Tab " Take 50 mg by mouth at bedtime as needed for Sleep.     • clopidogrel (PLAVIX) 75 MG Tab Take 1 Tab by mouth every day. 90 Tab 3   • lisinopril (PRINIVIL, ZESTRIL) 40 MG tablet Take 1 Tab by mouth every day. 100 Tab 3   • DILTIAZem CD (CARTIA XT) 180 MG CAPSULE SR 24 HR Take 1 Cap by mouth every day. 90 Cap 3   • atorvastatin (LIPITOR) 40 MG Tab Take 1 Tab by mouth every bedtime. 90 Tab 3   • aspirin  MG Tablet Delayed Response Take 1 Tab by mouth every day. 100 Tab 3   • predniSONE (DELTASONE) 5 MG Tab Take 5 mg by mouth every day.       Medication Allergy/Sensitivities:  Allergies   Allergen Reactions   • Gold Hives   • Hydrochlorothiazide [Hctz] Itching   • Imuran [Azathioprine Sodium] Vomiting   • Naproxen Itching   • Azathioprine      DOES NOT REMEMBER REACTIOON   • Hydrochlorothiazide    • Naproxen    • Relafen [Nabumetone]      Did not work   • Tape      Paper tape is ok     Family History   Problem Relation Age of Onset   • Non-contributory Mother         emphysema   • Hypertension Mother    • Hyperlipidemia Mother    • Stroke Father         brain aneurysm   • Lung Disease Father         chronic bronchitis   • Heart Disease Father         MI, heart aneurysm     Family History: includes Heart Disease in her father; Hyperlipidemia in her mother; Hypertension in her mother; Lung Disease in her father; Non-contributory in her mother; Stroke in her father.     Social History: reports that she has been smoking cigarettes. She has a 52.00 pack-year smoking history. She has never used smokeless tobacco. She reports that she does not drink alcohol or use drugs.    Living situation: lives with     Palliative Performance Scale: 30%    Spiritual History: Jehovah's witness, prays everyday, but declines alla visit    ROS:    Review of Systems   Constitutional: Negative for chills and fever.   HENT: Negative for congestion.    Eyes: Negative for double vision and photophobia.   Respiratory: Negative for cough,  "shortness of breath and stridor.    Cardiovascular: Negative for chest pain and palpitations.   Gastrointestinal: Negative for nausea and vomiting.   Genitourinary: Negative for dysuria and hematuria.   Musculoskeletal: Positive for back pain. Negative for falls.   Skin: Negative for itching and rash.   Neurological: Negative for seizures and loss of consciousness.   Endo/Heme/Allergies: Negative for polydipsia. Does not bruise/bleed easily.   Psychiatric/Behavioral: Negative for hallucinations. The patient is not nervous/anxious.        PE:   Recent vital signs  Weight/BMI: Body mass index is 14.18 kg/m².  /58   Pulse 69   Temp 36.4 °C (97.5 °F) (Temporal)   Resp 16   Ht 1.6 m (5' 3\")   Wt 36.3 kg (80 lb 0.4 oz)   SpO2 95%   BMI 14.18 kg/m²   Vitals:    11/06/19 1649 11/06/19 2000 11/07/19 0400 11/07/19 0823   BP: 152/76 112/49 138/56 141/58   Pulse: 72 68 66 69   Resp: 16 16 16 16   Temp: 37.6 °C (99.7 °F) 36.2 °C (97.2 °F) 36.3 °C (97.3 °F) 36.4 °C (97.5 °F)   TempSrc:  Temporal Temporal Temporal   SpO2: 100% 98% 98% 95%   Weight:       Height:         Oxygen Therapy:  Pulse Oximetry: 95 %, O2 (LPM): 2, O2 Delivery: Nasal Cannula  Physical Exam   Constitutional: She is oriented to person, place, and time. She appears malnourished. She appears unhealthy. She appears cachectic.   HENT:   Head: Normocephalic and atraumatic.   Eyes: Pupils are equal, round, and reactive to light. EOM are normal.   Neck: No JVD present.   Cardiovascular: Normal rate and regular rhythm.   Pulmonary/Chest: Effort normal and breath sounds normal. No stridor. No respiratory distress.   Abdominal: Soft. There is no tenderness.   Musculoskeletal:      Right shoulder: She exhibits decreased range of motion and pain.   Lymphadenopathy:     She has no cervical adenopathy.   Neurological: She is alert and oriented to person, place, and time.   Skin: Skin is warm and dry.   Psychiatric: Memory and judgment normal.       Lab Data " Review:  No results found for this or any previous visit (from the past 24 hour(s)).    Imaging/Procedures Review:    CT-ABDOMEN-PELVIS WITH   Final Result   Addendum 1 of 1      11/3/2019 9:56 AM      HISTORY/REASON FOR EXAM:  Abd pain, gastroenteritis or colitis suspected.   Low back pain      TECHNIQUE/EXAM DESCRIPTION:   CT scan of the abdomen and pelvis with    contrast.      Contrast-enhanced helical scanning was obtained from the diaphragmatic    domes through the pubic symphysis following the bolus administration of    nonionic contrast without complication.      70 mL of Omnipaque 350 nonionic contrast was administered without    complication.      Low dose optimization technique was utilized for this CT exam including    automated exposure control and adjustment of the mA and/or kV according to    patient size.      COMPARISON: 11/3/2015      FINDINGS: There is hyperexpansion of the lung bases with mild peripheral    reticulation.      CT Abdomen:   The liver, spleen, adrenal glands, and pancreas are unremarkable.      There is mild intrahepatic and significant extrahepatic ductal dilatation.    The distal common bile duct measures approximately 12 mm, unchanged. No    definite choledocholithiasis is appreciated. The gallbladder has been    removed.      The kidneys enhance symmetrically. There are bilateral hypodense masses.    The largest mass in the lower pole the left kidney is consistent with a    cyst. Additional masses are indeterminate in measure up to 3.6 cm,    previously 3.2 cm.      The abdominal aorta is markedly ectatic. There are scattered arterial    calcifications. There is aneurysmal dilatation with peripheral low-density    plaque. Maximum diameters are approximately 3.7 x 3.3 cm. There is dense    calcification in the common iliac    arteries with bilateral stents. A thrombosed left common iliac artery    aneurysm is peripheral to the stent.      There is a large amount of colonic  stool. No significant bowel wall    thickening is identified. The appendix is not identified. No    pneumoperitoneum.      CT Pelvis:   The bladder is unremarkable.      No significant free fluid or adenopathy is identified.      Multilevel compression deformities are again noted and will be described    on separate lumbar spine CT. The bones are osteopenic.         1.  No acute intra-abdominal findings.   2.  Large amount of colonic stool.   3.  Severe atherosclerotic disease. Infrarenal abdominal aortic aneurysm    has increased in size from the prior exam.   4.  Stable extrahepatic ductal dilatation.   5.  Bilateral renal masses, likely hyperdense/proteinaceous cyst. Neoplasm    cannot be excluded. If clinically indicated, follow-up renal protocol CT    or MRI is recommended for further evaluation.      Final      CT-LSPINE W/O PLUS RECONS   Final Result      1.  Compression deformity involving the L4 vertebral body has worsened from the prior CT and is likely subacute.      2.  Additional compression deformities are unchanged.      3.  Osteopenia         Problem List:  Principal Problem:    Compression fracture of L4 vertebra (HCC) POA: Yes  Active Problems:    Essential hypertension POA: Yes    Rheumatoid arthritis involving multiple sites with positive rheumatoid factor (CMS-HCC) POA: Yes    Goals of care, counseling/discussion POA: Unknown    Hypokalemia POA: Yes    Severe protein-calorie malnutrition (HCC) POA: Yes    Abdominal aortic aneurysm (AAA) without rupture (HCC) POA: Yes  Resolved Problems:    * No resolved hospital problems. *      DISCUSSION/RECOMMENDATIONS:     Assessment/Recommendations:  #Acute on chronic pain  MEDD (morphine equivalent daily dose) ~ 38 mg:  -Hydrocodone-acetaminophen  mg x 5 doses = 35 mg MEDD  -Morphine IV 1 mg x 1 doses = 3 mg MEDD     11/6/19 ~ 38 mg MEDD     CONTINUOUS OPIOID THERAPY  Start MS Contin 15 mg BID     SHORT ACTING OPIOID THERAPY  Increase from  hydrocodone-acetaminophen  mg 1 tabs q4h PRN to hydrocodone-acetaminophen  mg 2 tabs q4h PRN     ADJUNCT THERAPY  Discontinue acetaminophen 650 mg q6h PRN to avoid exceeding daily limit     NON-PHARMACOLOGIC INTERVENTIONS  Back brace per Orthopedics    #Constipation  -Continue senna-docusate 2 tabs daily, scheduled  -Continue other PRNs    #Compression fracture at L4 2/2 steroid-induced osteoporosis  -Patient does not want to pursue further workup or surgical interventions    #Severe protein-calorie malnutrition  -Diet and supplements per primary provider    #Seropositive rheumatoid arthritis  -On prednisone only  -Follow up outpatient    Code Status: DNR/DNI    Advance Care Planning/Current Goals of Care: 20 minutes was spent discussing advance care planning. POL done this admission.     60 minutes spent with greater than 50% spent counseling and coordinating the patient's care. Please refer to HPI and assessment/plan for details.     Thank you for allowing the palliative care team to participate in this patient's care. Please call with any questions/needs.

## 2019-11-08 NOTE — CARE PLAN
Problem: Safety  Goal: Will remain free from falls  Outcome: PROGRESSING AS EXPECTED  Note:   Call light within reach. Bed locked and in lowest position. Bed alarm on. Rounding maintained.     Problem: Pain Management  Goal: Pain level will decrease to patient's comfort goal  Outcome: PROGRESSING AS EXPECTED  Note:   Scheduled and PRN meds as ordered.

## 2019-11-08 NOTE — PROGRESS NOTES
Palliative Progress Note               Author: Emmanuel Naik Date & Time created: 2019  8:49 AM     Reason for subsequent visit: acute on chronic back pain 2/2 compression fracture    Interval History:  Did not sleep last night, not because of pain but because wasn't able to fall asleep. Home med trazodone was held yesterday per patient's request due to over-sedation. Feels groggy today after not sleeping, denies pain currently. Doesn't feel like her pain medications are making her too sedated. Yesterday pain was controlled well enough to allow patient to transfer and sit up in a chair for 90 minutes. Says back brace doesn't fit well and needs it re-fitted. Ate a lot of her breakfast this morning. Only recent pain she has had was the site of Lovenox injection.     Review of Systems:  ROS   Constitutional: Negative for chills and fever.   HENT: Negative for congestion.    Eyes: Negative for double vision and photophobia.   Respiratory: Negative for cough, shortness of breath and stridor.    Cardiovascular: Negative for chest pain and palpitations.   Gastrointestinal: Negative for nausea and vomiting.   Genitourinary: Negative for dysuria and hematuria.   Musculoskeletal: Positive for back pain. Negative for falls.   Skin: Negative for itching and rash.   Neurological: Negative for seizures and loss of consciousness.   Endo/Heme/Allergies: Negative for polydipsia. Does not bruise/bleed easily.   Psychiatric/Behavioral: Negative for hallucinations. The patient is not nervous/anxious.   No interval change from prior.    Physical Exam:    Recent vital signs  Temp (24hrs), Av.7 °C (98.1 °F), Min:36.1 °C (97 °F), Max:37.6 °C (99.7 °F)  Temperature: 36.3 °C (97.4 °F)  Pulse  Av.7  Min: 60  Max: 96   Blood Pressure : 119/65       Physical Exam   Constitutional: She is oriented to person, place, and time. She appears malnourished. She appears unhealthy. She appears cachectic.   HENT:   Head: Normocephalic and  atraumatic.   Eyes: Pupils are equal, round, and reactive to light. EOM are normal.   Neck: No JVD present.   Cardiovascular: Normal rate and regular rhythm.   Pulmonary/Chest: Effort normal and breath sounds normal. No stridor. No respiratory distress.   Abdominal: Soft. There is no tenderness.   Musculoskeletal:      Back: She exhibits decreased range of motion and pain.   Lymphadenopathy:     She has no cervical adenopathy.   Neurological: She is alert and oriented to person, place, and time.   Skin: Skin is warm and dry.   Psychiatric: Memory and judgment normal.   No interval change from prior.           Medications reviewed. Labs Reviewed.     Problem List:  Principal Problem:    Compression fracture of L4 vertebra (HCC) POA: Yes  Active Problems:    Essential hypertension POA: Yes    Rheumatoid arthritis involving multiple sites with positive rheumatoid factor (CMS-HCC) POA: Yes    Goals of care, counseling/discussion POA: Unknown    Hypokalemia POA: Yes    Severe protein-calorie malnutrition (HCC) POA: Yes    Abdominal aortic aneurysm (AAA) without rupture (HCC) POA: Yes  Resolved Problems:    * No resolved hospital problems. *      Assessment/Recommendations:  #Acute on chronic pain  MEDD (morphine equivalent daily dose) ~ 85.5 mg:  -Hydrocodone-acetaminophen  mg x 7 doses = 52.5 mg MEDD  -Morphine IV 1 mg x 1 doses = 3 mg MEDD     11/7/19 ~ 85.5 mg MEDD  11/6/19 ~ 38 mg MEDD     CONTINUOUS OPIOID THERAPY  Increase MS Contin 15 mg BID to MS Contin 30 mg BID     SHORT ACTING OPIOID THERAPY  Change hydrocodone-acetaminophen  mg 2 tabs q4h PRN to hydrocodone-acetaminophen  mg 1-2 tabs q4h PRN with pain scale, to allow lower PRN dose, per patient's request     ADJUNCT THERAPY  Will reassess tomorrow, after opiate increase, if medication to help with sleep is warranted     NON-PHARMACOLOGIC INTERVENTIONS  -Back brace management per Orthopedics     #Constipation  -Continue senna-docusate 2 tabs  daily, scheduled  -Continue other PRNs     #Compression fracture at L4 2/2 steroid-induced osteoporosis  -Patient does not want to pursue further workup or surgical interventions     #Severe protein-calorie malnutrition  -Diet and supplements per primary provider     #Seropositive rheumatoid arthritis  -On prednisone only  -Follow up outpatient     Code Status: DNR/DNI    Advance Care Planning/Current Goals of Care:  POLST, along with hospice paperwork, e-mailed to patient's daughter per her request. 25 minutes spent with greater than 50% spent counseling and coordinating the patient's care regarding acute pain & symptom management.   Please refer to HPI and assessment/plan for details.     Thank you for allowing the palliative care team to participate in this patient's care. Please call with any questions/needs.

## 2019-11-08 NOTE — PROGRESS NOTES
[facility] Medicine Daily Progress Note    Date of Service  11/8/2019    Chief Complaint  74 y.o. female admitted 11/3/2019 with history of severe RA on chronic prednisone presents after hearing a pop with acute low back pain and inability to ambulate.  CT of the lumbar spine with subacute L4 compression fracture.  She is also found to have a enlarging abdominal aortic aneurysm measuring 3.5 x 3.3 cm.    She is admitted for back pain and compression fracture.    Hospital Course    74 y.o. female admitted 11/3/2019 with history of severe RA on chronic prednisone presents after hearing a pop with acute low back pain and inability to ambulate.  CT of the lumbar spine with subacute L4 compression fracture.  She is also found to have a enlarging abdominal aortic aneurysm measuring 3.5 x 3.3 cm.    She is admitted for back pain and compression fracture.      Interval Problem Update  Patient is sitting up in a chair, improving muscle strength of the lower extremities, still with generalized weakness  Reports improvement in low back pain  Denies any chest pain, shortness of breath, fever or chills  Patient's daughter reports 60 pound weight loss due to poor appetite and pain      We did have an extensive discussion with patient and family at bedside, they would like to continue conservative therapy  They would like to avoid any procedures  I had a discussion with Dr. Betancur, to evaluate  However at this point family is opting against any surgical procedures.  Palliative care to be consulted      11/5: seen and examined, she is still in a lot of back pain, in chair, not wanting to work with pt/ot, refusing snf, or rehab, does not want any surgery or procedures, wants pain control.   Discussed hospice and palliative care, ok with speaking with them will consult now    11/6: seen and patient is sitting in chair and crying in pain,  at bedside, updated her about the fact there is no palliative care outpatient and since she  "does not want any t/m, they will need to get an outpatien pain specialist.  At this time we need to figure out a good pain regimen for her however    11/7: pain is still not controlled, she has adequate amount of pain meds on board along with iv morphine, still in 10/10 pain, does not want to get out of bed, angry this morning, family at bedside, patient states \" I just want to lay here and die\"  Daughter asked about assisted suicide, I discussed we don't do that here    Will adjust pain regimen, switch to ms contin  Monitor closely    11/8: seen this morning, much calmer and pleasant, pain is better controlled, 4/10 this morning in severity still cant move much  Monitor vitals closely  Palliative adjusting meds    Consultants/Specialty  Neurosurgery  hospice  Code Status  Full    Disposition  TBD        Review of Systems  Review of Systems   Constitutional: Negative for chills, diaphoresis, fever and malaise/fatigue.   HENT: Negative for congestion and hearing loss.    Respiratory: Negative for cough, sputum production, shortness of breath and wheezing.    Cardiovascular: Negative for chest pain, palpitations and leg swelling.   Gastrointestinal: Negative for abdominal pain, nausea and vomiting.   Genitourinary: Negative for dysuria and flank pain.   Musculoskeletal: Positive for back pain (improving) and myalgias. Negative for falls and joint pain.   Neurological: Negative for dizziness, tremors, sensory change, speech change, focal weakness, seizures, loss of consciousness, weakness and headaches.   Psychiatric/Behavioral: Negative for depression and memory loss. The patient is not nervous/anxious.         Physical Exam  Temp:  [36.1 °C (97 °F)-37.6 °C (99.7 °F)] 36.3 °C (97.4 °F)  Pulse:  [60-79] 79  Resp:  [16-17] 16  BP: (119-170)/(58-66) 119/65  SpO2:  [94 %-98 %] 94 %    Physical Exam  Vitals signs and nursing note reviewed.   Constitutional:       General: She is not in acute distress.     Appearance: She " is not diaphoretic.      Comments: Frail, elderly, cachectic   HENT:      Head: Normocephalic and atraumatic.      Nose: Nose normal.   Eyes:      General:         Right eye: No discharge.         Left eye: No discharge.      Pupils: Pupils are equal, round, and reactive to light.   Neck:      Musculoskeletal: Neck supple.      Thyroid: No thyromegaly.      Vascular: No JVD.   Cardiovascular:      Rate and Rhythm: Normal rate.      Heart sounds: No murmur.   Pulmonary:      Effort: No respiratory distress.      Breath sounds: Normal breath sounds. No wheezing.   Abdominal:      General: Bowel sounds are normal. There is no distension.      Palpations: Abdomen is soft.      Tenderness: There is no tenderness.   Musculoskeletal:         General: Tenderness present.      Comments: Limited, 2/2 pain and fractures   Skin:     General: Skin is warm and dry.      Findings: No erythema or rash.   Neurological:      Mental Status: She is alert and oriented to person, place, and time.      Cranial Nerves: No cranial nerve deficit.      Sensory: No sensory deficit.      Motor: Weakness present.      Coordination: Coordination normal.      Gait: Gait abnormal.      Deep Tendon Reflexes: Reflexes normal.   Psychiatric:         Behavior: Behavior normal.         Thought Content: Thought content normal.         Fluids    Intake/Output Summary (Last 24 hours) at 11/8/2019 1105  Last data filed at 11/8/2019 0800  Gross per 24 hour   Intake 350 ml   Output --   Net 350 ml       Laboratory                        Imaging  CT-ABDOMEN-PELVIS WITH   Final Result   Addendum 1 of 1      11/3/2019 9:56 AM      HISTORY/REASON FOR EXAM:  Abd pain, gastroenteritis or colitis suspected.   Low back pain      TECHNIQUE/EXAM DESCRIPTION:   CT scan of the abdomen and pelvis with    contrast.      Contrast-enhanced helical scanning was obtained from the diaphragmatic    domes through the pubic symphysis following the bolus administration of     nonionic contrast without complication.      70 mL of Omnipaque 350 nonionic contrast was administered without    complication.      Low dose optimization technique was utilized for this CT exam including    automated exposure control and adjustment of the mA and/or kV according to    patient size.      COMPARISON: 11/3/2015      FINDINGS: There is hyperexpansion of the lung bases with mild peripheral    reticulation.      CT Abdomen:   The liver, spleen, adrenal glands, and pancreas are unremarkable.      There is mild intrahepatic and significant extrahepatic ductal dilatation.    The distal common bile duct measures approximately 12 mm, unchanged. No    definite choledocholithiasis is appreciated. The gallbladder has been    removed.      The kidneys enhance symmetrically. There are bilateral hypodense masses.    The largest mass in the lower pole the left kidney is consistent with a    cyst. Additional masses are indeterminate in measure up to 3.6 cm,    previously 3.2 cm.      The abdominal aorta is markedly ectatic. There are scattered arterial    calcifications. There is aneurysmal dilatation with peripheral low-density    plaque. Maximum diameters are approximately 3.7 x 3.3 cm. There is dense    calcification in the common iliac    arteries with bilateral stents. A thrombosed left common iliac artery    aneurysm is peripheral to the stent.      There is a large amount of colonic stool. No significant bowel wall    thickening is identified. The appendix is not identified. No    pneumoperitoneum.      CT Pelvis:   The bladder is unremarkable.      No significant free fluid or adenopathy is identified.      Multilevel compression deformities are again noted and will be described    on separate lumbar spine CT. The bones are osteopenic.         1.  No acute intra-abdominal findings.   2.  Large amount of colonic stool.   3.  Severe atherosclerotic disease. Infrarenal abdominal aortic aneurysm    has increased  in size from the prior exam.   4.  Stable extrahepatic ductal dilatation.   5.  Bilateral renal masses, likely hyperdense/proteinaceous cyst. Neoplasm    cannot be excluded. If clinically indicated, follow-up renal protocol CT    or MRI is recommended for further evaluation.      Final      CT-LSPINE W/O PLUS RECONS   Final Result      1.  Compression deformity involving the L4 vertebral body has worsened from the prior CT and is likely subacute.      2.  Additional compression deformities are unchanged.      3.  Osteopenia           Assessment/Plan  * intractable back pain  Persistent but improving likely 2/2 Patient has advanced osteoporosis that is a result of years of steroid use  Presents with L4 compression fracture and intractable pain, subacute  Lidocaine patch  - MS contine 30mg q12 hours  - norco 10mg 1-2 q4 hours  - pt/ot when able  Palliative care to evaluate for pain management  She will need continue outpatient pain management, referral made already by her PCP    Compression fracture of L4 vertebra (HCC)- (present on admission)  Assessment & Plan  Patient has advanced osteoporosis that is a result of years of steroid use  Presents with L4 compression fracture and intractable pain, subacute  Admit for pain control  calcitonin for acute pain of compression fracture  Calcium and vitamin D  PT/OT    See above regimen        Rheumatoid arthritis involving multiple sites with positive rheumatoid factor (CMS-[facility])- (present on admission)  Assessment & Plan  Chronic rheumatoid arthritis  She has not tolerated DMARDS in the past  Continue home dose of prednisone, 5mg daily    Essential hypertension- (present on admission)  Assessment & Plan  Uncontrolled hypertension in the emergency room  Likely related to pain  Continue diltiazem and lisinopril    Goals of care, counseling/discussion  Assessment & Plan  I spent >40mins discussing goals of care with patient and her family. She is refusing any intervention and  also pt/ot. She is more interested in pain control and wants to go home, we discussed options of palliative care and hospice. At this time patient and family are interested in further discussions with them  We also discussed code status and patient wishes to be DNR,       Abdominal aortic aneurysm (AAA) without rupture (HCC)- (present on admission)  Assessment & Plan  Patient has AAA that has increased in size to 3.7cm  Discussed need for outpatient follow up with patient and her daughter    Severe protein-calorie malnutrition (HCC)- (present on admission)  Assessment & Plan  Clinical diagnosis based on cachectic appearance and low BMI    Hypokalemia- (present on admission)  Assessment & Plan  resolved       VTE prophylaxis: Lovenox    Control pain, with iv pain meds and adjustment

## 2019-11-09 NOTE — PROGRESS NOTES
"Palliative Progress Note               Author: Shira L Garo Date & Time created: 2019  1:26 PM     Reason for subsequent visit: acute on chronic back pain due to compression fracture of L4 in the presence of rheumatoid arthritis and osteoporosis    Interval History:  Received e-mail from patient's daughter Emma earlier today inquiring about transportation home and outpatient prescription management and referrals.  Reached out to both Dr. Irizarry and HARRY Garcia to inquire about Emma's questions.  Responded to her e-mail with answers.    Patient currently sitting on bedside commode attempting to have a bowel movement.  Patient's daughter Emma at bedside.  Provided updates to Emma regarding questions and e-mail.  Patient's pain is well controlled and she states it is currently \"fine.\"  She feels that overall it is well-managed.  She reports more ease with mobility and is eager to discharge home tomorrow.  She reports her appetite has improved slightly as well.  Patient reports her normal bowel pattern is up to 1 week stating \"I do not eat that much.\"    Patient expressed frustration with her debility stating it has been difficult for her to sit down at her computer and type due to her RA and cognition problems related to an old TBI.  She was tearful stating \"I am not stupid.\"  Patient expressed continued depression regarding debility.  Patient's daughter Emma expressed that \"depression runs in her family.\"    Review of Systems:  Positive for dyspnea (on exertion). Positive for pain (low back). Positive for fatigue. Negative for sedation. Positive for anxiety. Positive for depression. Negative for insomnia. Negative for nausea and vomiting. Positive for constipation (last bowel movement e ).      Physical Exam:    Recent vital signs  Temp (24hrs), Av °C (98.6 °F), Min:36.5 °C (97.7 °F), Max:38 °C (100.4 °F)  Temperature: 36.5 °C (97.7 °F)  Pulse  Av.1  Min: 60  Max: 96   Blood Pressure : 138/68   "     Physical Exam   Constitutional: She is oriented to person, place, and time. She appears cachectic. Nasal cannula in place.   HENT:   Mouth/Throat: No oropharyngeal exudate.   Cardiovascular: Normal rate and normal heart sounds.   Pulmonary/Chest: Effort normal and breath sounds normal. No respiratory distress.   Abdominal: She exhibits distension. Bowel sounds are decreased. There is no tenderness.   Musculoskeletal:         General: No edema.      Thoracic back: She exhibits deformity (kyphosis).   Neurological: She is alert and oriented to person, place, and time.   Skin: Skin is warm.   Blanchable redness to many bony prominences   Psychiatric: Her speech is normal and behavior is normal. Judgment and thought content normal. She exhibits a depressed mood.   Nursing note and vitals reviewed.     Medications reviewed.     Problem List:  1.  Low back pain related to compression fracture of L4 vertebra (active)  2.  Opioid-induced constipation (active)  3.  Adjustment disorder with depressed mood (active)  4.  Severe protein calorie malnutrition with BMI of 14.18 (active)  5.  Rheumatoid arthritis involving multiple sites disease (chronic)  6.  Osteoporosis (chronic)  7.  Abdominal aortic aneurysm without rupture (active/stable)  8.  Tobacco abuse with acute on chronic respiratory failure (active)  9.  Essential hypertension (chronic)  10.  Hypokalemia (resolved)    Assessment/Recommendations with shared decision making:  Low back pain related to compression fracture of L4 vertebra   MEDD (morphine equivalent daily dose) ~ 97.5 mg:  -MS Contin 30 mg x 2 doses = 60 mg MEDD  -Hydrocodone-acetaminophen  mg x 5 tabs = 50 mg x 0.75 ~ 37.5 mg MEDD     11/8/19 ~ 83 mg MEDD  11/7/19 ~ 86 mg MEDD  11/6/19 ~ 38 mg MEDD     CONTINUOUS OPIOID THERAPY  Continue MS Contin 30 mg PO BID     SHORT ACTING OPIOID THERAPY  Continue hydrocodone-acetaminophen  mg 1-2 tabs Q 4 hours PRN (max dose 9 tabs in 24 hours to keep  "total acetaminophen dose < 3000 mg)     ADJUNCT THERAPY  Start duloxetine 20 mg PO daily for duel effect of neuropathic pain control and depression     NON-PHARMACOLOGIC INTERVENTIONS  TLSO brace per orthopedics  Patient does not want any surgical or procedural interventions related to her overall frailty and age, which is reasonable     Constipation  Continue senna-docusate 2 tabs daily  Start lactulose 15 mL PO TID until bowel movement  Continue other bowel regimen PRN; recommended that patient may need bisacodyl suppository if no bowel movement by the end of the day     Adjustment disorder with depressed mood   Start duloxetine 20 mg PO daily for depression (and neuropathic pain)    Severe protein-calorie malnutrition  Diet and supplements in place     Rheumatoid arthritis and osteoporosis  Has done many DMARDS in the past  On chronic low dose prednisone therapy 5 mg PO daily  Sees Dr. Goodwin outpatient    Tobacco abuse with acute on chronic respiratory failure   Patient being set up for home oxygen    Code Status: DNR/DNI    Advance Care Planning/Current Goals of Care:  See consult note from DAVID Gonzales 11/5/19.  Patient reports she has completed an Advance Directive but it is not on file with in Epic and she declined completion of another.  LONNIE completed this admission.  Hospice has been discussed.  Patient is planning for discharge home tomorrow and has declined HHC or SNF.  She reports \"I went fishing and they called the  on me.\"  Explained that if she missed an appointment with C they may have done a well check as persons are typically homebound for [facility] services and it is their duty to make sure she is safe.  Reiterated that hospice care is there to ensure comfort/promote quality of life and function.  Explained that it is important not to miss medical visits/appointments.  Patient plans to follow up with her PCP Mariella Mccullough, [facility], and [facility].  Will update " Dr. Mccullough upon patient discharge.  30 minutes spent discussing advance care planning.     35 minutes spent with greater than 50% spent counseling and coordinating the patient's care regarding symptom management.   Please refer to HPI and assessment/plan for details.     Thank you for allowing the palliative care team to participate in this patient's care. Please call with any questions/needs.     EARLE Phillips.  Palliative Care Nurse Practitioner  [phone]

## 2019-11-09 NOTE — PROGRESS NOTES
[facility] Medicine Daily Progress Note    Date of Service  11/9/2019    Chief Complaint  74 y.o. female admitted 11/3/2019 with history of severe RA on chronic prednisone presents after hearing a pop with acute low back pain and inability to ambulate.  CT of the lumbar spine with subacute L4 compression fracture.  She is also found to have a enlarging abdominal aortic aneurysm measuring 3.5 x 3.3 cm.    She is admitted for back pain and compression fracture.    Hospital Course    74 y.o. female admitted 11/3/2019 with history of severe RA on chronic prednisone presents after hearing a pop with acute low back pain and inability to ambulate.  CT of the lumbar spine with subacute L4 compression fracture.  She is also found to have a enlarging abdominal aortic aneurysm measuring 3.5 x 3.3 cm.    She is admitted for back pain and compression fracture.      Interval Problem Update  Patient is sitting up in a chair, improving muscle strength of the lower extremities, still with generalized weakness  Reports improvement in low back pain  Denies any chest pain, shortness of breath, fever or chills  Patient's daughter reports 60 pound weight loss due to poor appetite and pain      We did have an extensive discussion with patient and family at bedside, they would like to continue conservative therapy  They would like to avoid any procedures  I had a discussion with Dr. Betancur, to evaluate  However at this point family is opting against any surgical procedures.  Palliative care to be consulted      11/5: seen and examined, she is still in a lot of back pain, in chair, not wanting to work with pt/ot, refusing snf, or rehab, does not want any surgery or procedures, wants pain control.   Discussed hospice and palliative care, ok with speaking with them will consult now    11/6: seen and patient is sitting in chair and crying in pain,  at bedside, updated her about the fact there is no palliative care outpatient and since she  "does not want any t/m, they will need to get an outpatien pain specialist.  At this time we need to figure out a good pain regimen for her however    11/7: pain is still not controlled, she has adequate amount of pain meds on board along with iv morphine, still in 10/10 pain, does not want to get out of bed, angry this morning, family at bedside, patient states \" I just want to lay here and die\"  Daughter asked about assisted suicide, I discussed we don't do that here    Will adjust pain regimen, switch to ms contin  Monitor closely    11/8: seen this morning, much calmer and pleasant, pain is better controlled, 4/10 this morning in severity still cant move much  Monitor vitals closely  Palliative adjusting meds    11/9: seen this morning, she is feeling better, pain is controlled.  at bedside. They want to leave tomorrow to home, she is still refusing HH or SNF.   Will need home O2, will order evaluation per nursing  Consultants/Specialty  Neurosurgery  hospice  Code Status  Full    Disposition  Dc tomorrow        Review of Systems  Review of Systems   Constitutional: Negative for chills, diaphoresis, fever and malaise/fatigue.   HENT: Negative for congestion and hearing loss.    Respiratory: Negative for cough, sputum production, shortness of breath and wheezing.    Cardiovascular: Negative for chest pain, palpitations and leg swelling.   Gastrointestinal: Negative for abdominal pain, nausea and vomiting.   Genitourinary: Negative for dysuria and flank pain.   Musculoskeletal: Positive for back pain (improving) and myalgias. Negative for falls and joint pain.   Neurological: Negative for dizziness, tremors, sensory change, speech change, focal weakness, seizures, loss of consciousness, weakness and headaches.   Psychiatric/Behavioral: Negative for depression and memory loss. The patient is not nervous/anxious.         Physical Exam  Temp:  [36.5 °C (97.7 °F)-38 °C (100.4 °F)] 36.5 °C (97.7 °F)  Pulse:  " [66-96] 72  Resp:  [16] 16  BP: (125-140)/(63-89) 138/68  SpO2:  [93 %-100 %] 93 %    Physical Exam  Vitals signs and nursing note reviewed.   Constitutional:       General: She is not in acute distress.     Appearance: She is not diaphoretic.      Comments: Frail, elderly, cachectic   HENT:      Head: Normocephalic and atraumatic.      Nose: Nose normal.   Eyes:      General:         Right eye: No discharge.         Left eye: No discharge.      Pupils: Pupils are equal, round, and reactive to light.   Neck:      Musculoskeletal: Neck supple.      Thyroid: No thyromegaly.      Vascular: No JVD.   Cardiovascular:      Rate and Rhythm: Normal rate.      Heart sounds: No murmur.   Pulmonary:      Effort: No respiratory distress.      Breath sounds: Normal breath sounds. No wheezing.   Abdominal:      General: Bowel sounds are normal. There is no distension.      Palpations: Abdomen is soft.      Tenderness: There is no tenderness.   Musculoskeletal:         General: Tenderness present.      Comments: Limited, 2/2 pain and fractures   Skin:     General: Skin is warm and dry.      Findings: No erythema or rash.   Neurological:      Mental Status: She is alert and oriented to person, place, and time.      Cranial Nerves: No cranial nerve deficit.      Sensory: No sensory deficit.      Motor: Weakness present.      Coordination: Coordination normal.      Gait: Gait abnormal.      Deep Tendon Reflexes: Reflexes normal.   Psychiatric:         Behavior: Behavior normal.         Thought Content: Thought content normal.         Fluids    Intake/Output Summary (Last 24 hours) at 11/9/2019 1203  Last data filed at 11/9/2019 0800  Gross per 24 hour   Intake 450 ml   Output --   Net 450 ml       Laboratory                        Imaging  CT-ABDOMEN-PELVIS WITH   Final Result   Addendum 1 of 1      11/3/2019 9:56 AM      HISTORY/REASON FOR EXAM:  Abd pain, gastroenteritis or colitis suspected.   Low back pain      TECHNIQUE/EXAM  DESCRIPTION:   CT scan of the abdomen and pelvis with    contrast.      Contrast-enhanced helical scanning was obtained from the diaphragmatic    domes through the pubic symphysis following the bolus administration of    nonionic contrast without complication.      70 mL of Omnipaque 350 nonionic contrast was administered without    complication.      Low dose optimization technique was utilized for this CT exam including    automated exposure control and adjustment of the mA and/or kV according to    patient size.      COMPARISON: 11/3/2015      FINDINGS: There is hyperexpansion of the lung bases with mild peripheral    reticulation.      CT Abdomen:   The liver, spleen, adrenal glands, and pancreas are unremarkable.      There is mild intrahepatic and significant extrahepatic ductal dilatation.    The distal common bile duct measures approximately 12 mm, unchanged. No    definite choledocholithiasis is appreciated. The gallbladder has been    removed.      The kidneys enhance symmetrically. There are bilateral hypodense masses.    The largest mass in the lower pole the left kidney is consistent with a    cyst. Additional masses are indeterminate in measure up to 3.6 cm,    previously 3.2 cm.      The abdominal aorta is markedly ectatic. There are scattered arterial    calcifications. There is aneurysmal dilatation with peripheral low-density    plaque. Maximum diameters are approximately 3.7 x 3.3 cm. There is dense    calcification in the common iliac    arteries with bilateral stents. A thrombosed left common iliac artery    aneurysm is peripheral to the stent.      There is a large amount of colonic stool. No significant bowel wall    thickening is identified. The appendix is not identified. No    pneumoperitoneum.      CT Pelvis:   The bladder is unremarkable.      No significant free fluid or adenopathy is identified.      Multilevel compression deformities are again noted and will be described    on separate  lumbar spine CT. The bones are osteopenic.         1.  No acute intra-abdominal findings.   2.  Large amount of colonic stool.   3.  Severe atherosclerotic disease. Infrarenal abdominal aortic aneurysm    has increased in size from the prior exam.   4.  Stable extrahepatic ductal dilatation.   5.  Bilateral renal masses, likely hyperdense/proteinaceous cyst. Neoplasm    cannot be excluded. If clinically indicated, follow-up renal protocol CT    or MRI is recommended for further evaluation.      Final      CT-LSPINE W/O PLUS RECONS   Final Result      1.  Compression deformity involving the L4 vertebral body has worsened from the prior CT and is likely subacute.      2.  Additional compression deformities are unchanged.      3.  Osteopenia           Assessment/Plan  * intractable back pain  Persistent but improving likely 2/2 Patient has advanced osteoporosis that is a result of years of steroid use  Presents with L4 compression fracture and intractable pain, subacute  Lidocaine patch  - MS contine 30mg q12 hours  - norco 10mg 1-2 q4 hours  - pt/ot when able; move around more today  Palliative care to evaluate for pain management  She will need continue outpatient pain management, referral made already by her PCP      Compression fracture of L4 vertebra (HCC)- (present on admission)  Assessment & Plan  Patient has advanced osteoporosis that is a result of years of steroid use  Presents with L4 compression fracture and intractable pain, subacute  Admit for pain control  calcitonin for acute pain of compression fracture  Calcium and vitamin D  PT/OT    See above regimen    Rheumatoid arthritis involving multiple sites with positive rheumatoid factor (CMS-[facility])- (present on admission)  Assessment & Plan  Chronic rheumatoid arthritis  She has not tolerated DMARDS in the past  Continue home dose of prednisone, 5mg daily    Essential hypertension- (present on admission)  Assessment & Plan  Uncontrolled hypertension in the  emergency room  Likely related to pain  Continue diltiazem and lisinopril    Goals of care, counseling/discussion  Assessment & Plan  I spent >40mins discussing goals of care with patient and her family. She is refusing any intervention and also pt/ot. She is more interested in pain control and wants to go home, we discussed options of palliative care and hospice. At this time patient and family are interested in further discussions with them  We also discussed code status and patient wishes to be DNR,       Abdominal aortic aneurysm (AAA) without rupture (HCC)- (present on admission)  Assessment & Plan  Patient has AAA that has increased in size to 3.7cm  Discussed need for outpatient follow up with patient and her daughter    Severe protein-calorie malnutrition (HCC)- (present on admission)  Assessment & Plan  Clinical diagnosis based on cachectic appearance and low BMI    Hypokalemia- (present on admission)  Assessment & Plan  resolved       VTE prophylaxis: Lovenox    Control pain, with iv pain meds and adjustment

## 2019-11-09 NOTE — CARE PLAN
Problem: Safety  Goal: Will remain free from falls  Outcome: PROGRESSING AS EXPECTED  Note:   Fall precautions in place.     Problem: Pain Management  Goal: Pain level will decrease to patient's comfort goal  Outcome: PROGRESSING AS EXPECTED  Note:   Scheduled and PRN meds as ordered.

## 2019-11-09 NOTE — PROGRESS NOTES
Pt AAox4, generalized weakness noted. Pain control seems improved this shift. Pt able to get up to chair for meals. Eating and voiding. VSS

## 2019-11-09 NOTE — DISCHARGE PLANNING
Called pt's daughter Aida as she is requesting transport to home tomorrow. Pt unable to ambulate and requires W/C transport. Explained that [employer] does not have W/C transport at this time. If pt requires W/C, would need to use [facility] and that would be private pay. Daughter frustrated that pt has insurance and benefits and unable to utilize. Daughter also expressed concerns about the care that pt requires at home. Pt lives with spouse, daughter lives close by.  Called Ana, Palliative Care. She states she has had multiple discussions with pt and family about plan of care. At this time, pt and spouse wish to continue care at home, they are not ready to consider hospice. They do not want HH. Pt will need home 02 but unable to ambulate to get 02 sat readings, not sure that she has diagnosis to support home 02. Anticipate difficulty with obtaining home 02 and transport.

## 2019-11-09 NOTE — CARE PLAN
Problem: Safety  Goal: Will remain free from injury  Outcome: PROGRESSING AS EXPECTED     Problem: Pain Management  Goal: Pain level will decrease to patient's comfort goal  Outcome: PROGRESSING AS EXPECTED   Bed alarm in place, pain meds given per MAR

## 2019-11-10 PROBLEM — E87.6 HYPOKALEMIA: Status: RESOLVED | Noted: 2017-04-05 | Resolved: 2019-01-01

## 2019-11-10 NOTE — HOSPICE
ATTN: Provider/Care management team/Nurses    RE: Referral to [facility]    Thank you for the referral to [facility] for the patient listed above. We have made contact with the patient. To access [facility] documentation, click on Chart Review and then click onto Encounters (left top corner of screen).      If you have any questions or concerns regarding the patient’s transition to [facility], please do not hesitate to contact us at [phone].     We look forward to collaborating with you,  [facility] Care Team

## 2019-11-10 NOTE — PROGRESS NOTES
Assume bedside report and care at 0700. Patient alert and oriented x 4. Patient call light within reach, bed locked and lower position, educated to call for assistance. . Patient has chronic pain, no nausea and vomiting, no numbness and tingling. Medications given per MAR. Ambulates SBA hand held, up in chair most of the day. Vital signs stable, no signs and symptom of infection noted on assessment. Plan of care discussed. All questions answered at this time.

## 2019-11-10 NOTE — DISCHARGE PLANNING
Received Choice form at 0352  Agency/Facility Name: [facility]  Referral sent per Choice form @ 3363

## 2019-11-10 NOTE — DISCHARGE PLANNING
[facility] Name: [facility]   Spoke To: Daniella  Outcome: Full on weekend, will process on Monday    Agency/Facility Name: [facility]  Spoke To: Sudhir  Outcome: Reviewing

## 2019-11-10 NOTE — DISCHARGE PLANNING
Received Choice form at 4166  Agency/Facility Name: [facility]  Referral sent per Choice form @ [phone]

## 2019-11-10 NOTE — CARE PLAN
Problem: Safety  Goal: Will remain free from falls  Outcome: PROGRESSING AS EXPECTED  Note:   Fall precautions in place.     Problem: Bowel/Gastric:  Goal: Normal bowel function is maintained or improved  Outcome: PROGRESSING AS EXPECTED  Note:   Multiple BMs this shift     Problem: Pain Management  Goal: Pain level will decrease to patient's comfort goal  Outcome: PROGRESSING AS EXPECTED  Note:   Scheduled and PRN meds as ordered.

## 2019-11-10 NOTE — FACE TO FACE
"Face to Face Note  -  Durable Medical Equipment    Vanessa Irizarry M.D. - NPI: [id number]  I certify that this patient is under my care and that they had a durable medical equipment(DME)face to face encounter by myself that meets the physician DME face-to-face encounter requirements with this patient on:    Date of encounter:   Patient:                    MRN:                       YOB: 2017  Lakisha Bhatti  [id number]  1945     The encounter with the patient was in whole, or in part, for the following medical condition, which is the primary reason for durable medical equipment:  CHF    I certify that, based on my findings, the following durable medical equipment is medically necessary:  Oxygen.    HOME O2 Saturation Measurements:(Values must be present for Home Oxygen orders)  Room air sat at rest: 85  Room air sat with amb: (can not ambulate d/t pain and weakness)  With liters of O2: 2, O2 sat at rest with O2: 93  With Liters of O2: (Unable to ambulate more than a few steps ), O2 sat with amb with O2 : (unable to ambulate)  Is the patient mobile?: No(D/T pain )    My Clinical findings support the need for the above equipment due to:  Hypoxia    Supporting Symptoms: The patient requires supplemental oxygen, as the following interventions have been tried with limited or no improvement: \"Bronchodilators and/or steroid inhalers, \"Positive expiratory pressure therapies, \"Oral and/or IV steroids, \"Ambulation with oximetry and \"Incentive spirometry    "

## 2019-11-10 NOTE — DISCHARGE PLANNING
Anticipated Discharge Disposition:   Home with [facility]    Action:   RN CM met with patient and patient's spouse, Nico, concerning discharge planning.  Patient wants to go now.      RN CM explained the process of getting home and the arrangements needed to be made.  Patient agreed to let arrangments be made and prepare to discharge home tomorrow afternoon via [facility].  RN CM is requesting tomorrow afternoon, 2 PM - 3 PM transport time for [facility] to make arrangements and as requested by patient's spouse, Nico.      GEORGE MCDONALD attempted to contact patient's daughter, Emma, for updated discharge plan.  GEORGE MCDONALD left Ibetor message to contact RN CM.    Barriers to Discharge:   None    Plan:   RN CM faxed transport request on 11/11/2019 at 14:00 - 15:00.  Patient's spouse recently had wheel chair ramp installed as well as purchasing the equipment required.  RN CM to contact patient's daughter, Emma, to update on plans for discharge.

## 2019-11-10 NOTE — DISCHARGE PLANNING
Anticipated Discharge Disposition:   Hospice Care    Action:   RN CM instructed to obtain choice for hospice care.  Family did not care which agency; only want fast service to assist patient home    Barriers to Discharge:   Hospice arrangements    Plan:   RN CM faxed choice form for hospice care to [facility]

## 2019-11-11 NOTE — PROGRESS NOTES
Palliative Care Advance Care Planning Progress Note    General: Lakisha Bhatti is a 74-year-old female admitted 11/3/19 for compression fracture of L4 in the presence of rheumatoid arthritis and osteoporosis.  She was originally seen by palliative care on 11/5/19 for advance care planning and 11/7/19 for pain management.     Discussion: Patient sitting at edge of bed eating a sandwich.  Daughter Emma at bedside.  Symptoms are well controlled. Patient is eager to discharge home.  Hospice care has with [facility] and [facility] [employer] arranged for transport home at 1400.  Wished patient and her family well and explained I would notify patient's PCP Dr. Padilla of discharge plan.       Plan: Discharge home today with [facility] via [facility] [employer] at 14:00.     Updated: Dr. Kamlesh Padilla via [facility] in-basket.  Notified admitting hospice RN for need to assist patient with updating POLST.     Thank you for allowing Palliative Care to participate in this patient's care. Please call our team with questions and/or additional needs.    Total visit time was 17 minutes discussing advance care planning.    EARLE Phillips.  Palliative Care Nurse Practitioner  [phone]

## 2019-11-11 NOTE — DISCHARGE PLANNING
Agency/Facility Name: [facility]  Spoke To: Talia   Outcome: Wants pt to come earlier.    Agency/Facility Name: [facility]  Spoke To: Rex  Outcome: Only can do 1530.    Agency/Facility Name: [facility]  Spoke To: Talia   Outcome: Says to set up for 1530, but wants to see if pt can go home with  instead.

## 2019-11-11 NOTE — DISCHARGE PLANNING
Anticipated Discharge Disposition:   Home with [facility]    Action:    Pts daughter Aida at bedside.  Requested transport today via [facility].  RN HARRY spoke with [facility].  Transport confirmed for 1400 today via [facility].  Aida agreeable.    Packet with POLST, face sheets and outpatient prescriptions given to bedside GEORGE Kang.    Spouse at home.  Hospice personnel there and DME arranged.  Barriers to Discharge:    None    Plan:    DC

## 2019-11-11 NOTE — PROGRESS NOTES
[facility] Medicine Daily Progress Note    Date of Service  11/10/2019    Chief Complaint  74 y.o. female admitted 11/3/2019 with history of severe RA on chronic prednisone presents after hearing a pop with acute low back pain and inability to ambulate.  CT of the lumbar spine with subacute L4 compression fracture.  She is also found to have a enlarging abdominal aortic aneurysm measuring 3.5 x 3.3 cm.    She is admitted for back pain and compression fracture.    Hospital Course    74 y.o. female admitted 11/3/2019 with history of severe RA on chronic prednisone presents after hearing a pop with acute low back pain and inability to ambulate.  CT of the lumbar spine with subacute L4 compression fracture.  She is also found to have a enlarging abdominal aortic aneurysm measuring 3.5 x 3.3 cm.    She is admitted for back pain and compression fracture.      Interval Problem Update  Patient is sitting up in a chair, improving muscle strength of the lower extremities, still with generalized weakness  Reports improvement in low back pain  Denies any chest pain, shortness of breath, fever or chills  Patient's daughter reports 60 pound weight loss due to poor appetite and pain      We did have an extensive discussion with patient and family at bedside, they would like to continue conservative therapy  They would like to avoid any procedures  I had a discussion with Dr. Betancur, to evaluate  However at this point family is opting against any surgical procedures.  Palliative care to be consulted      11/5: seen and examined, she is still in a lot of back pain, in chair, not wanting to work with pt/ot, refusing snf, or rehab, does not want any surgery or procedures, wants pain control.   Discussed hospice and palliative care, ok with speaking with them will consult now    11/6: seen and patient is sitting in chair and crying in pain,  at bedside, updated her about the fact there is no palliative care outpatient and since she  "does not want any t/m, they will need to get an outpatien pain specialist.  At this time we need to figure out a good pain regimen for her however    11/7: pain is still not controlled, she has adequate amount of pain meds on board along with iv morphine, still in 10/10 pain, does not want to get out of bed, angry this morning, family at bedside, patient states \" I just want to lay here and die\"  Daughter asked about assisted suicide, I discussed we don't do that here    Will adjust pain regimen, switch to ms contin  Monitor closely    11/8: seen this morning, much calmer and pleasant, pain is better controlled, 4/10 this morning in severity still cant move much  Monitor vitals closely  Palliative adjusting meds    11/9: seen this morning, she is feeling better, pain is controlled.  at bedside. They want to leave tomorrow to home, she is still refusing HH or SNF.   Will need home O2, will order evaluation per nursing    11/10: seen this morning, sitting in chair,  at bedside, pain controlled on current regimen,   Considering hospice  Referral placed  Needs O2 set up, ordered and transport set up  Consultants/Specialty  Neurosurgery  hospice  Code Status  Full    Disposition  Dc tomorrow        Review of Systems  Review of Systems   Constitutional: Negative for chills, diaphoresis, fever and malaise/fatigue.   HENT: Negative for congestion and hearing loss.    Respiratory: Negative for cough, sputum production, shortness of breath and wheezing.    Cardiovascular: Negative for chest pain, palpitations and leg swelling.   Gastrointestinal: Negative for abdominal pain, nausea and vomiting.   Genitourinary: Negative for dysuria and flank pain.   Musculoskeletal: Positive for back pain (improving) and myalgias. Negative for falls and joint pain.   Neurological: Negative for dizziness, tremors, sensory change, speech change, focal weakness, seizures, loss of consciousness, weakness and headaches. "   Psychiatric/Behavioral: Negative for depression and memory loss. The patient is not nervous/anxious.         Physical Exam  Temp:  [36.5 °C (97.7 °F)-37.4 °C (99.4 °F)] 36.5 °C (97.7 °F)  Pulse:  [61-73] 73  Resp:  [14-16] 14  BP: ()/(49-69) 107/68  SpO2:  [90 %-98 %] 98 %    Physical Exam  Vitals signs and nursing note reviewed.   Constitutional:       General: She is not in acute distress.     Appearance: She is not diaphoretic.      Comments: Frail, elderly, cachectic   HENT:      Head: Normocephalic and atraumatic.      Nose: Nose normal.   Eyes:      General:         Right eye: No discharge.         Left eye: No discharge.      Pupils: Pupils are equal, round, and reactive to light.   Neck:      Musculoskeletal: Neck supple.      Thyroid: No thyromegaly.      Vascular: No JVD.   Cardiovascular:      Rate and Rhythm: Normal rate.      Heart sounds: No murmur.   Pulmonary:      Effort: No respiratory distress.      Breath sounds: Normal breath sounds. No wheezing.   Abdominal:      General: Bowel sounds are normal. There is no distension.      Palpations: Abdomen is soft.      Tenderness: There is no tenderness.   Musculoskeletal:         General: Tenderness present.      Comments: Limited, 2/2 pain and fractures   Skin:     General: Skin is warm and dry.      Findings: No erythema or rash.   Neurological:      Mental Status: She is alert and oriented to person, place, and time.      Cranial Nerves: No cranial nerve deficit.      Sensory: No sensory deficit.      Motor: Weakness present.      Coordination: Coordination normal.      Gait: Gait abnormal.      Deep Tendon Reflexes: Reflexes normal.   Psychiatric:         Behavior: Behavior normal.         Thought Content: Thought content normal.         Fluids    Intake/Output Summary (Last 24 hours) at 11/10/2019 1652  Last data filed at 11/10/2019 1500  Gross per 24 hour   Intake 1090 ml   Output --   Net 1090 ml       Laboratory                         Imaging  CT-ABDOMEN-PELVIS WITH   Final Result   Addendum 1 of 1      11/3/2019 9:56 AM      HISTORY/REASON FOR EXAM:  Abd pain, gastroenteritis or colitis suspected.   Low back pain      TECHNIQUE/EXAM DESCRIPTION:   CT scan of the abdomen and pelvis with    contrast.      Contrast-enhanced helical scanning was obtained from the diaphragmatic    domes through the pubic symphysis following the bolus administration of    nonionic contrast without complication.      70 mL of Omnipaque 350 nonionic contrast was administered without    complication.      Low dose optimization technique was utilized for this CT exam including    automated exposure control and adjustment of the mA and/or kV according to    patient size.      COMPARISON: 11/3/2015      FINDINGS: There is hyperexpansion of the lung bases with mild peripheral    reticulation.      CT Abdomen:   The liver, spleen, adrenal glands, and pancreas are unremarkable.      There is mild intrahepatic and significant extrahepatic ductal dilatation.    The distal common bile duct measures approximately 12 mm, unchanged. No    definite choledocholithiasis is appreciated. The gallbladder has been    removed.      The kidneys enhance symmetrically. There are bilateral hypodense masses.    The largest mass in the lower pole the left kidney is consistent with a    cyst. Additional masses are indeterminate in measure up to 3.6 cm,    previously 3.2 cm.      The abdominal aorta is markedly ectatic. There are scattered arterial    calcifications. There is aneurysmal dilatation with peripheral low-density    plaque. Maximum diameters are approximately 3.7 x 3.3 cm. There is dense    calcification in the common iliac    arteries with bilateral stents. A thrombosed left common iliac artery    aneurysm is peripheral to the stent.      There is a large amount of colonic stool. No significant bowel wall    thickening is identified. The appendix is not identified. No     pneumoperitoneum.      CT Pelvis:   The bladder is unremarkable.      No significant free fluid or adenopathy is identified.      Multilevel compression deformities are again noted and will be described    on separate lumbar spine CT. The bones are osteopenic.         1.  No acute intra-abdominal findings.   2.  Large amount of colonic stool.   3.  Severe atherosclerotic disease. Infrarenal abdominal aortic aneurysm    has increased in size from the prior exam.   4.  Stable extrahepatic ductal dilatation.   5.  Bilateral renal masses, likely hyperdense/proteinaceous cyst. Neoplasm    cannot be excluded. If clinically indicated, follow-up renal protocol CT    or MRI is recommended for further evaluation.      Final      CT-LSPINE W/O PLUS RECONS   Final Result      1.  Compression deformity involving the L4 vertebral body has worsened from the prior CT and is likely subacute.      2.  Additional compression deformities are unchanged.      3.  Osteopenia           Assessment/Plan  * intractable back pain  Persistent but improving likely 2/2 Patient has advanced osteoporosis that is a result of years of steroid use  Presents with L4 compression fracture and intractable pain, subacute  Lidocaine patch  - MS contine 30mg q12 hours  - norco 10mg 1-2 q4 hours  - pt/ot when able; move around more today  Palliative care to evaluate for pain management  She will need continue outpatient pain management, referral made already by her PCP  [facility] pending    Compression fracture of L4 vertebra (HCC)- (present on admission)  Assessment & Plan  Patient has advanced osteoporosis that is a result of years of steroid use  Presents with L4 compression fracture and intractable pain, subacute  Admit for pain control  calcitonin for acute pain of compression fracture  Calcium and vitamin D  PT/OT    See above regimen, scripts have been printed, hospice taking over care    Rheumatoid arthritis involving multiple sites with  positive rheumatoid factor (CMS-HCC)- (present on admission)  Assessment & Plan  Chronic rheumatoid arthritis  She has not tolerated DMARDS in the past  Continue home dose of prednisone, 5mg daily    Essential hypertension- (present on admission)  Assessment & Plan  Uncontrolled hypertension in the emergency room  Likely related to pain  Continue diltiazem and lisinopril    Goals of care, counseling/discussion  Assessment & Plan  I spent >40mins discussing goals of care with patient and her family. She is refusing any intervention and also pt/ot. She is more interested in pain control and wants to go home, we discussed options of palliative care and hospice. At this time patient and family are interested in further discussions with them  We also discussed code status and patient wishes to be DNR,       Abdominal aortic aneurysm (AAA) without rupture (HCC)- (present on admission)  Assessment & Plan  Patient has AAA that has increased in size to 3.7cm  Discussed need for outpatient follow up with patient and her daughter    Severe protein-calorie malnutrition (HCC)- (present on admission)  Assessment & Plan  Clinical diagnosis based on cachectic appearance and low BMI    Hypokalemia- (present on admission)  Assessment & Plan  resolved       VTE prophylaxis: Lovenox    Hospice pending

## 2019-11-11 NOTE — PROGRESS NOTES
Patient A/Ox4. Patient reports 4/10 low back pain, norco given which she reports brought her pain to a 0/10. Turning patient Q 2 hours. Patient refused waffle cushion and SCD's. Patient refusing to wear TLSO. Patient maintaining >90% on 2L O2. Will continue to monitor.

## 2019-11-11 NOTE — PROGRESS NOTES
Pt d/c home with home health per MD order. D/c instructions, medications, and appointments reviewed with pt and pt's daughter. Both verbalized understanding. Incentive spirometer training & written instructions provided to pt with demonstration back. Pt transported off unit at approx ***

## 2019-11-11 NOTE — DISCHARGE INSTRUCTIONS
Discharge Instructions per Lamont Aponte M.D.    Follow up with primary care doctor in 1 week  Follow up with neurosurgery in 1 week      DIET: healthy diet    ACTIVITY:  As tolerated    DIAGNOSIS: lumbar compression fracture    Return to ER if pain, numbness, dizziness, fever chills,               Discharge Instructions    Be sure to schedule a follow-up appointment with your primary care doctor or any specialists as instructed.       I understand that a diet low in cholesterol, fat, and sodium is recommended for good health. Unless I have been given specific instructions below for another diet, I accept this instruction as my diet prescription.         Incentive Spirometer  An incentive spirometer is a tool that measures how well you are filling your lungs with each breath. This tool can help keep your lungs clear and active. Taking long, deep breaths may help reverse or decrease the chance of developing breathing (pulmonary) problems, especially infection, following:  · Surgery of the chest or abdomen.  · Surgery if you have a history of smoking or a lung problem.  · A long period of time when you are unable to move or be active.  If the spirometer includes an indicator to show your best effort, your health care provider or respiratory therapist will help you set a goal. Keep a log of your progress if directed by your health care provider.  What are the risks?  · Breathing too quickly may cause dizziness or cause you to pass out. Take your time so you do not get dizzy or lightheaded.  · If you are in pain, you may need to take or ask for pain medicine before doing incentive spirometry. It is harder to take a deep breath if you are having pain.  How to use your incentive spirometer  1. Sit on the edge of your bed if possible, or sit up as far as you can in bed or on a chair.  2. Hold the incentive spirometer in an upright position.  3. Breathe out normally.  4. Place the mouthpiece in your mouth and seal  your lips tightly around it.  5. Breathe in slowly and as deeply as possible, raising the piston or the ball toward the top of the column.  6. Hold your breath for 3-5 seconds or for as long as possible. Allow the piston or ball to fall to the bottom of the column.  7. Remove the mouthpiece from your mouth and breathe out normally.  8. The spirometer may include an indicator to show your best effort. Use the indicator as a goal to work toward during each repetition.  9. Rest for a few seconds and repeat this at least 10 times, every 1-2 hours when you are awake. Take your time and take a few normal breaths between deep breaths. Breathing too quickly may cause dizziness or cause you to pass out. Take your time so you do not get dizzy or lightheaded.  10. After each set of 10 deep breaths, practice coughing to be sure your lungs are clear. If you had a surgical cut (incision) made during surgery, support your incision when coughing by placing a pillow or rolled-up towel firmly against it.  Once you are able to get out of bed, walk around indoors and cough well. You may stop using the incentive spirometer when instructed by your health care provider.  Contact a health care provider if:  · You are having difficulty using the spirometer.  · You have trouble using the spirometer as often as instructed.  · Your pain medicine is not giving enough relief while using the spirometer.  · You have a fever.  · You develop shortness of breath.  Get help right away if:  · You develop a cough with bloody sputum.  · You develop worsening pain, redness, or discharge at or near the incision site.  This information is not intended to replace advice given to you by your health care provider. Make sure you discuss any questions you have with your health care provider.  Document Released: 04/29/2008 Document Revised: 09/11/2017 Document Reviewed: 07/27/2015  Elsevier Interactive Patient Education © 2017 [employer]    What You Need to  Know About: Prescription Opioid Pain Medicine    Opioids are powerful medicines that are used to treat moderate to severe pain. Opioids should be taken with the supervision of a trained health care provider. They should be taken for the shortest period of time as possible. This is because opioids can be addictive and the longer you take opioids, the greater your risk of addiction (opioid use disorder).  What do opioids do?  Opioids help reduce or eliminate pain. When used for short periods of time, they can help you:  · Sleep better.  · Do better in physical or occupational therapy.  · Feel better in the first few days after an injury.  · Recover from surgery.  What kind of problems can opioids cause?  Opioids can cause side effects, such as:  · Constipation.  · Nausea.  · Vomiting.  · Drowsiness.  · Confusion.  · Opioid use disorder.  · Breathing difficulties (respiratory depression).  Using opioid pain medicines for longer than 3 days increases your risk of these side effects.  Taking opioid pain medicine for a long period of time can affect your ability to do daily tasks. It also puts you at risk for:  · Car accidents.  · Heart attack.  · Overdose, which can sometimes lead to death.  What can increase my risk for developing problems while taking opioids?  You may be at an especially high risk for problems while taking opioids if you:  · Are over the age of 65.  · Are pregnant.  · Have kidney or liver disease.  · Have certain mental health conditions, such as depression or anxiety.  · Have a history of substance use disorder.  · Have had an opioid overdose in the past.  How do I stop taking opioids if I have been taking them for a long time?  If you have been taking opioid medicine for more than a few weeks, you may need to slowly stop taking them (taper). Tapering your use of opioids can decrease your chances of experiencing withdrawal symptoms, such as:  · Abdominal pain and  cramping.  · Nausea.  · Sweating.  · Sleepiness.  · Restlessness.  · Uncontrollable shaking (tremors).  · Cravings for the medicine.  Do not attempt to taper your use of opioids on your own. Talk with your health care provider about how to do this. Your health care provider may prescribe a step-down schedule based on how much medicine you are taking and how long you have been taking it.  What are the benefits of stopping the use of opioids?  By switching from opioid pain medicine to non-opioid pain management options, you will decrease your risk of accidents and injuries associated with long-term opioid use. You will also be able to:  · Monitor your pain more accurately and know when to seek medical care if it is not improving.  · Decrease risk to others around you. Having opioids in the home increases the risk for accidental or intentional use or overdose by others.  How can I treat pain without opioids?  Pain can be managed with many types of alternative treatments. Ask your health care provider to refer you to one or more specialists who can help you manage pain through:  · Physical or occupational therapy.  · Counseling (cognitive-behavioral therapy).  · Good nutrition.  · Biofeedback.  · Massage.  · Meditation.  · Non-opioid medicine.  · Following a gentle exercise program.  Where can I get support?  If you have been taking opioids for a long time, you may benefit from receiving support for quitting from a local support group or counselor. Ask your health care provider for a referral to these resources in your area.  When should I seek medical care?  Seek medical care right away if you are taking opioids and you experience any of the following:  · Difficulty breathing.  · Breathing that is more shallow or slower than normal.  · A very slow heartbeat (pulse).  · Severe confusion.  · Unconsciousness.  · Sleepiness.  · Difficulty waking from sleep.  · Slurred speech.  · Nausea and vomiting.  · Cold, clammy  skin.  · Blue lips or fingernails.  · Limpness.  · Abnormally small pupils.  If you think that you or someone else may have taken too much of an opioid medicine, get medical help right away. Do not wait to see if the symptoms go away on their own. Call your local emergency services (349 in the [place] or call the hotline of the National Poison Control Center (241-872-6513 in the [place]   Where can I get more information?  To learn more about opioid medicines, visit the Centers for Disease Control and Prevention web site Opioid Basics at https://www.cdc.gov/drugoverdos/opioids/index.html.  Summary  · Opioid medicines can help you manage moderate-to-severe pain for a short period of time.  · Taking opioid pain medicine for a long period of time puts you at risk for unintentional accidents, injury, and even death.  · If you think that you or someone else may have taken too much of an opioid, get medical help right away.  This information is not intended to replace advice given to you by your health care provider. Make sure you discuss any questions you have with your health care provider.  Document Released: 01/13/2017 Document Revised: 08/11/2017 Document Reviewed: 07/29/2016  [employer] Interactive Patient Education © 2017 [employer]      Depression / Suicide Risk    As you are discharged from this [facility] facility, it is important to learn how to keep safe from harming yourself.    Recognize the warning signs:  · Abrupt changes in personality, positive or negative- including increase in energy   · Giving away possessions  · Change in eating patterns- significant weight changes-  positive or negative  · Change in sleeping patterns- unable to sleep or sleeping all the time   · Unwillingness or inability to communicate  · Depression  · Unusual sadness, discouragement and loneliness  · Talk of wanting to die  · Neglect of personal appearance   · Rebelliousness- reckless behavior  · Withdrawal from  people/activities they love  · Confusion- inability to concentrate     If you or a loved one observes any of these behaviors or has concerns about self-harm, here's what you can do:  · Talk about it- your feelings and reasons for harming yourself  · Remove any means that you might use to hurt yourself (examples: pills, rope, extension cords, firearm)  · Get professional help from the community (Mental Health, Substance Abuse, psychological counseling)  · Do not be alone:Call your Safe Contact- someone whom you trust who will be there for you.  · Call your local CRISIS HOTLINE 674-8744 or 666-510-4261  · Call your local Children's Mobile Crisis Response Team [place] (432) 464-9504 or www.[employer]  · Call the toll free National Suicide Prevention Hotlines   · National Suicide Prevention Lifeline 876-766-CCGC (8433)  · National Hope Line Network 800-SUICIDE (189-6238)

## 2019-11-11 NOTE — PROGRESS NOTES
"Palliative Progress Note               Author: Shira Wyman Date & Time created: 11/10/2019  5:01 PM     Reason for subsequent visit: acute on chronic back pain due to compression fracture of L4 in the presence of rheumatoid arthritis and osteoporosis    Interval History:  Received call from RN this morning stating that patient's daughter/patient want hospice care and family is now on board with hospice care.  Explained I provided patient's daughter with a hospice choice form 19 via e-mail.  Discussed that we can send a referral to any hospice of their choice.  GEORGE White arranged for AHRRY Navarro to obtain choice.  Referral sent to [facility] who met with the patient today with expected discharge for tomorrow .  CM assisting with [facility] transportation.     No acute events overnight.  Sitting up in recliner chair; well groomed.  No family at bedside.  Patient had a large bowel movement yesterday.  Denies pain currently.  Slept well last night. She reports feeling somewhat tired.  Discussed discharge planning and she stated \"well I'm not totally sure.  We are going to decide in the morning.\"   She is concerned with the cost.     Review of Systems:  Positive for dyspnea. Positive for pain (intermittent low back). Positive for anorexia (patient not concerned with her poor appetite). Positive for fatigue. Negative for sedation. Negative for insomnia. Negative for nausea and vomiting. Negative for constipation.      Physical Exam:    Recent vital signs  Temp (24hrs), Av.9 °C (98.4 °F), Min:36.5 °C (97.7 °F), Max:37.4 °C (99.4 °F)  Temperature: 36.5 °C (97.7 °F)  Pulse  Av  Min: 60  Max: 96   Blood Pressure : 107/68       Physical Exam   Constitutional: She is oriented to person, place, and time. She appears cachectic. She is cooperative. Nasal cannula in place.   HENT:   Mouth/Throat: No oropharyngeal exudate.   Cardiovascular: Normal rate and normal heart sounds.   Pulmonary/Chest: Effort " normal and breath sounds normal. No respiratory distress.   Abdominal: Soft. She exhibits distension (mild). Bowel sounds are increased. There is no tenderness.   Musculoskeletal:         General: No edema.      Thoracic back: She exhibits deformity (kyphosis).   Neurological: She is oriented to person, place, and time.   Skin: Skin is warm and dry.   Psychiatric: She has a normal mood and affect. Her speech is normal and behavior is normal. Judgment and thought content normal. Cognition and memory are normal.   Nursing note and vitals reviewed.     Medications reviewed. Labs Reviewed.     Problem List:  1.  Low back pain related to compression fracture of L4 vertebra (active)  2.  Opioid-induced constipation (active)  3.  Adjustment disorder with depressed mood (active)  4.  Severe protein calorie malnutrition with BMI of 14.18 (active)  5.  Rheumatoid arthritis involving multiple sites disease (chronic)  6.  Osteoporosis (chronic)  7.  Abdominal aortic aneurysm without rupture (active/stable)  8.  Tobacco abuse with acute on chronic respiratory failure (active)  9.  Essential hypertension (chronic)  10.  Hypokalemia (resolved)    Assessment/Recommendations with shared decision making:  Low back pain related to compression fracture of L4 vertebra   MEDD (morphine equivalent daily dose) ~ 75 mg:  -MS Contin 30 mg x 2 doses = 60 mg MEDD  -Hydrocodone-acetaminophen  mg x 2 tabs = 20 mg x 0.75 ~ 15 mg MEDD     11/9/19 ~ 98 mg MEDD  11/8/19 ~ 83 mg MEDD  11/7/19 ~ 86 mg MEDD  11/6/19 ~ 38 mg MEDD     CONTINUOUS OPIOID THERAPY  Continue MS Contin 30 mg PO BID     SHORT ACTING OPIOID THERAPY  Continue hydrocodone-acetaminophen  mg 1-2 tabs Q 4 hours PRN (max dose 9 tabs in 24 hours to keep total acetaminophen dose < 3000 mg)     ADJUNCT THERAPY  Continue duloxetine 20 mg PO daily for duel effect of neuropathic pain control and depression     NON-PHARMACOLOGIC INTERVENTIONS  TLSO brace per orthopedics  Patient  does not want any surgical or procedural interventions related to her overall frailty and age, which is reasonable     Constipation  Continue senna-docusate 2 tabs daily  Change lactulose 15 mL PO TID PRN  Continue other bowel regimen PRN     Adjustment disorder with depressed mood   Duloxetine 20 mg PO daily for depression (and neuropathic pain)    Severe protein-calorie malnutrition  Diet and supplements in place     Rheumatoid arthritis and osteoporosis  Has done many DMARDS in the past  On chronic low dose prednisone therapy 5 mg PO daily  Sees Dr. Goodwin outpatient    Tobacco abuse with acute on chronic respiratory failure   Patient being set up for home oxygen    Code Status: DNR/DNI    Advance Care Planning/Current Goals of Care:  Tentative discharge plan for home with [facility] tomorrow afternoon after [facility] set up and patient/her  sign consents. Reassured patient that hospice care is covered under her [employer] insurance which gave her reassurance.      25 minutes spent with greater than 50% spent counseling and coordinating the patient's care regarding symptom management and discharge planning.   Please refer to HPI and assessment/plan for details.     Thank you for allowing the palliative care team to participate in this patient's care. Please call with any questions/needs.     EARLE Phillips.  Palliative Care Nurse Practitioner  [phone]

## 2019-11-11 NOTE — DISCHARGE PLANNING
Received Transport Form @ 0800  Spoke to Kim @ [employer]    Transport is scheduled for 11/11 @ 1400 going to home.    Shira MCDONALD, and Talia fernando [facility] informed.

## 2019-11-12 NOTE — TELEPHONE ENCOUNTER
Sig length exceeds e-prescribing limit. 174/140 characters used.   Take 1 Tab by mouth every day. Pt blood pressure low average. Will continue to monitor B/P to see if lisinopril can be discontinued Indications: High Blood Pressure Disorder

## 2019-11-12 NOTE — DISCHARGE SUMMARY
Discharge Summary    CHIEF COMPLAINT ON ADMISSION  Chief Complaint   Patient presents with   • Low Back Pain   • Hypertension       Reason for Admission  Low back pain    Admission Date  11/3/2019    CODE STATUS  DNAR/DNI    HPI & HOSPITAL COURSE  Please see original H&P for specific information, patient was admitted due to low back pain, patient with past medical history of peripheral artery disease, rheumatoid arthritis with osteoporosis with significant chronic pain syndrome, admitted with severe low back pain had CT abdomen that showed no acute intra-abdominal findings, large amount of colonic stool, severe atherosclerotic disease disease infrarenal abdominal aortic aneurysm with increase in size from prior examination, bilateral renal masses likely hyperdense/proteinaceous cyst recommended follow-up with CT or MRI for further evaluation, CT L-spine showed compression deformity involving L4 vertebral body, patient admitted for pain management neurosurgery was consulted, patient refused MRI or any intervention kyphoplasty or surgical intervention neurosurgery recommended LSO brace for comfort, palliative care was consulted since patient did not want any aggressive intervention pain management was adjusted, patient decided to change her CODE STATUS to DNR/DNI and hospice was consulted Palliative care was consulted for pain management, gradually her pain was improved, patient patient family decided on hospice care, patient today is more stable hospice has accepted patient, patient is alert oriented tolerating diet her pain needs better control, patient is stated that she is been able to walk to the bathroom and she feels comfortable going home with hospice care, patient's daughter is at bedside and she agrees with plan for discharge with hospice care, patient is going to be discharged today she will continue with pain management at home hospice will follow-up she will need to follow-up with primary care doctor and  neurosurgery as outpatient, patient and patient's daughter expressed understanding of her discharge plan and agree with it all questions answered.  Due to patient's decision of going with hospice care and refusing aggressive intervention no further evaluation will be recommended for her CT abdomen findings unless patient change her mind and decides to agree with further investigation this will need to be follow-up by primary care physician as outpatient.    Therefore, she is discharged in fair and stable condition to home with close outpatient follow-up.    The patient met 2-midnight criteria for an inpatient stay at the time of discharge.    Discharge Date  11/11/2019    FOLLOW UP ITEMS POST DISCHARGE  Primary care physician in 1 week  Neurosurgery in 1 week/as needed    DISCHARGE DIAGNOSES  Principal Problem:    Compression fracture of L4 vertebra (HCC) POA: Yes  Active Problems:    Essential hypertension POA: Yes    Rheumatoid arthritis involving multiple sites with positive rheumatoid factor (CMS-HCC) POA: Yes    Goals of care, counseling/discussion POA: Unknown    Severe protein-calorie malnutrition (HCC) POA: Yes    Abdominal aortic aneurysm (AAA) without rupture (HCC) POA: Yes  Resolved Problems:    Hypokalemia POA: Yes      FOLLOW UP  Future Appointments   Date Time Provider Department Center   12/23/2019  9:40 AM Kamlesh Padilla M.D. [facility]     Kamlesh Padilla M.D.  [address]  [address]  [place] [id number] [phone]    In 1 week      Jasvir Betancur III, M.D.  [address]  [place] [address]  [phone]    In 1 week      Kamlesh Padilla M.D.  [address]  [place] [address]  [phone]          Tessie Diaz M.D.  [address] [facility] [address]  [address]  [place] [address] [phone]            MEDICATIONS ON DISCHARGE     Medication List      START taking these medications      Instructions   DULoxetine 20 MG Cpep  Commonly known as:  CYMBALTA   Take 1 Cap by mouth every  day.  Dose:  20 mg     morphine ER 30 MG Tbcr tablet  Commonly known as:  MS CONTIN   Take 1 Tab by mouth every 12 hours for 20 days.  Dose:  30 mg        CHANGE how you take these medications      Instructions   HYDROcodone/acetaminophen  MG Tabs  What changed:    · when to take this  · reasons to take this  Commonly known as:  NORCO   Take 1 Tab by mouth every four hours as needed for Severe Pain for up to 10 days.  Dose:  1 Tab     lisinopril 40 MG tablet  What changed:  additional instructions  Commonly known as:  PRINIVIL   Take 1 Tab by mouth every day.  Dose:  40 mg        CONTINUE taking these medications      Instructions   Aspirin 325 MG Tbec   Take 1 Tab by mouth every day.  Dose:  325 mg     atorvastatin 40 MG Tabs  Commonly known as:  LIPITOR   Take 1 Tab by mouth every bedtime.  Dose:  40 mg     clopidogrel 75 MG Tabs  Commonly known as:  PLAVIX   Take 1 Tab by mouth every day.  Dose:  75 mg     DILTIAZem  MG Cp24  Commonly known as:  CARTIA XT   Take 1 Cap by mouth every day.  Dose:  180 mg     predniSONE 5 MG Tabs  Commonly known as:  DELTASONE   Take 5 mg by mouth every day. Indications: Iridocyclitis, Rheumatoid Arthritis  Dose:  5 mg     traZODone 50 MG Tabs  Commonly known as:  DESYREL   Take 50 mg by mouth at bedtime as needed for Sleep. Pt only takes as needed and will cut pill in half or quarters  Dose:  50 mg            Allergies  Allergies   Allergen Reactions   • Gold Hives   • Hydrochlorothiazide [Hctz] Itching   • Imuran [Azathioprine Sodium] Vomiting   • Naproxen Itching   • Azathioprine      DOES NOT REMEMBER REACTIOON   • Hydrochlorothiazide    • Naproxen    • Relafen [Nabumetone]      Did not work   • Tape      Paper tape is ok       DIET  Regular diet    ACTIVITY  As tolerated.  Weight bearing as tolerated    CONSULTATIONS  Neurosurgery  palliative care    PROCEDURES  None    LABORATORY  Lab Results   Component Value Date    SODIUM 138 11/04/2019    POTASSIUM 3.9  11/04/2019    CHLORIDE 107 11/04/2019    CO2 25 11/04/2019    GLUCOSE 86 11/04/2019    BUN 16 11/04/2019    CREATININE 0.54 11/04/2019    CREATININE 0.75 02/28/2011    GLOMRATE >59 02/28/2011        Lab Results   Component Value Date    WBC 8.7 11/04/2019    HEMOGLOBIN 9.2 (L) 11/04/2019    HEMATOCRIT 36.7 (L) 11/04/2019    PLATELETCT 178 11/04/2019        Total time of the discharge process exceeds 38 minutes.

## 2020-01-01 ENCOUNTER — HOME CARE VISIT (OUTPATIENT)
Dept: HOSPICE | Facility: HOSPICE | Age: 75
End: 2020-01-01
Payer: MEDICARE

## 2020-01-01 ENCOUNTER — TELEPHONE (OUTPATIENT)
Dept: MEDICAL GROUP | Facility: MEDICAL CENTER | Age: 75
End: 2020-01-01

## 2020-01-01 ENCOUNTER — APPOINTMENT (OUTPATIENT)
Dept: MEDICAL GROUP | Facility: MEDICAL CENTER | Age: 75
End: 2020-01-01
Payer: MEDICARE

## 2020-01-01 ENCOUNTER — APPOINTMENT (OUTPATIENT)
Dept: HOSPICE | Facility: HOSPICE | Age: 75
End: 2020-01-01
Payer: MEDICARE

## 2020-01-01 VITALS — DIASTOLIC BLOOD PRESSURE: 64 MMHG | SYSTOLIC BLOOD PRESSURE: 128 MMHG | HEART RATE: 80 BPM | RESPIRATION RATE: 18 BRPM

## 2020-01-01 VITALS — RESPIRATION RATE: 24 BRPM | DIASTOLIC BLOOD PRESSURE: 84 MMHG | HEART RATE: 96 BPM | SYSTOLIC BLOOD PRESSURE: 140 MMHG

## 2020-01-01 VITALS — HEART RATE: 84 BPM | RESPIRATION RATE: 20 BRPM

## 2020-01-01 VITALS — RESPIRATION RATE: 22 BRPM | SYSTOLIC BLOOD PRESSURE: 108 MMHG | HEART RATE: 80 BPM | DIASTOLIC BLOOD PRESSURE: 80 MMHG

## 2020-01-01 VITALS — HEART RATE: 92 BPM | DIASTOLIC BLOOD PRESSURE: 80 MMHG | SYSTOLIC BLOOD PRESSURE: 152 MMHG | RESPIRATION RATE: 28 BRPM

## 2020-01-01 VITALS — RESPIRATION RATE: 14 BRPM | HEART RATE: 68 BPM | SYSTOLIC BLOOD PRESSURE: 142 MMHG | DIASTOLIC BLOOD PRESSURE: 78 MMHG

## 2020-01-01 VITALS — DIASTOLIC BLOOD PRESSURE: 68 MMHG | RESPIRATION RATE: 16 BRPM | SYSTOLIC BLOOD PRESSURE: 118 MMHG | HEART RATE: 76 BPM

## 2020-01-01 VITALS — HEART RATE: 77 BPM | RESPIRATION RATE: 21 BRPM | SYSTOLIC BLOOD PRESSURE: 134 MMHG | DIASTOLIC BLOOD PRESSURE: 78 MMHG

## 2020-01-01 VITALS — DIASTOLIC BLOOD PRESSURE: 72 MMHG | HEART RATE: 104 BPM | RESPIRATION RATE: 28 BRPM | SYSTOLIC BLOOD PRESSURE: 86 MMHG

## 2020-01-01 VITALS — DIASTOLIC BLOOD PRESSURE: 58 MMHG | RESPIRATION RATE: 20 BRPM | HEART RATE: 76 BPM | SYSTOLIC BLOOD PRESSURE: 158 MMHG

## 2020-01-01 VITALS — HEART RATE: 80 BPM | DIASTOLIC BLOOD PRESSURE: 74 MMHG | SYSTOLIC BLOOD PRESSURE: 118 MMHG | RESPIRATION RATE: 28 BRPM

## 2020-01-01 VITALS — DIASTOLIC BLOOD PRESSURE: 84 MMHG | RESPIRATION RATE: 18 BRPM | HEART RATE: 92 BPM | SYSTOLIC BLOOD PRESSURE: 150 MMHG

## 2020-01-01 VITALS — SYSTOLIC BLOOD PRESSURE: 142 MMHG | RESPIRATION RATE: 16 BRPM | HEART RATE: 82 BPM | DIASTOLIC BLOOD PRESSURE: 60 MMHG

## 2020-01-01 VITALS — SYSTOLIC BLOOD PRESSURE: 122 MMHG | RESPIRATION RATE: 28 BRPM | DIASTOLIC BLOOD PRESSURE: 72 MMHG | HEART RATE: 84 BPM

## 2020-01-01 VITALS — SYSTOLIC BLOOD PRESSURE: 136 MMHG | DIASTOLIC BLOOD PRESSURE: 84 MMHG | HEART RATE: 80 BPM | RESPIRATION RATE: 20 BRPM

## 2020-01-01 VITALS — SYSTOLIC BLOOD PRESSURE: 152 MMHG | DIASTOLIC BLOOD PRESSURE: 92 MMHG | RESPIRATION RATE: 18 BRPM | HEART RATE: 80 BPM

## 2020-01-01 VITALS
RESPIRATION RATE: 28 BRPM | TEMPERATURE: 97.7 F | SYSTOLIC BLOOD PRESSURE: 132 MMHG | DIASTOLIC BLOOD PRESSURE: 62 MMHG | HEART RATE: 88 BPM

## 2020-01-01 VITALS — SYSTOLIC BLOOD PRESSURE: 142 MMHG | HEART RATE: 78 BPM | RESPIRATION RATE: 18 BRPM | DIASTOLIC BLOOD PRESSURE: 64 MMHG

## 2020-01-01 VITALS — RESPIRATION RATE: 16 BRPM | SYSTOLIC BLOOD PRESSURE: 124 MMHG | DIASTOLIC BLOOD PRESSURE: 84 MMHG | HEART RATE: 92 BPM

## 2020-01-01 VITALS — RESPIRATION RATE: 28 BRPM | HEART RATE: 80 BPM

## 2020-01-01 VITALS — HEART RATE: 88 BPM | SYSTOLIC BLOOD PRESSURE: 130 MMHG | RESPIRATION RATE: 16 BRPM | DIASTOLIC BLOOD PRESSURE: 60 MMHG

## 2020-01-01 VITALS — DIASTOLIC BLOOD PRESSURE: 80 MMHG | RESPIRATION RATE: 16 BRPM | HEART RATE: 80 BPM | SYSTOLIC BLOOD PRESSURE: 162 MMHG

## 2020-01-01 DIAGNOSIS — F32.4 MAJOR DEPRESSIVE DISORDER WITH SINGLE EPISODE, IN PARTIAL REMISSION (HCC): ICD-10-CM

## 2020-01-01 DIAGNOSIS — M05.79 RHEUMATOID ARTHRITIS INVOLVING MULTIPLE SITES WITH POSITIVE RHEUMATOID FACTOR (HCC): ICD-10-CM

## 2020-01-01 PROCEDURE — S9126 HOSPICE CARE, IN THE HOME, P: HCPCS

## 2020-01-01 PROCEDURE — 6650990 HC HOSPICE AND HOME CARE RX REV CODE 0250

## 2020-01-01 PROCEDURE — G0299 HHS/HOSPICE OF RN EA 15 MIN: HCPCS

## 2020-01-01 PROCEDURE — G0155 HHCP-SVS OF CSW,EA 15 MIN: HCPCS

## 2020-01-01 RX ORDER — PREDNISONE 5 MG/1
5 TABLET ORAL 2 TIMES DAILY
Qty: 30 TAB | Refills: 2 | Status: SHIPPED | OUTPATIENT
Start: 2020-01-01

## 2020-01-01 RX ORDER — DULOXETIN HYDROCHLORIDE 20 MG/1
20 CAPSULE, DELAYED RELEASE ORAL DAILY
Qty: 30 CAP | Refills: 2 | Status: SHIPPED
Start: 2020-01-01 | End: 2020-01-01

## 2020-01-01 SDOH — ECONOMIC STABILITY: HOUSING INSECURITY: EVIDENCE OF SMOKING MATERIAL: 1

## 2020-01-01 ASSESSMENT — ENCOUNTER SYMPTOMS
LAST BOWEL MOVEMENT: 65435
DESCRIPTION OF MEMORY LOSS: SHORT TERM
LAST BOWEL MOVEMENT: 65427
LOWER EXTREMITY EDEMA: 1
SLEEP QUALITY: FAIR
COUGH CHARACTERISTICS: DRY
FATIGUE: 1
STOOL FREQUENCY: LESS THAN DAILY
FATIGUE: 1
DYSPNEA ON EXERTION: 1
SKIN LESIONS: 1
FATIGUE: 1
COUGH CHARACTERISTICS: WEAK
DYSPNEA ON EXERTION: 1
DESCRIPTION OF MEMORY LOSS: SHORT TERM
COUGH CHARACTERISTICS: NON-PRODUCTIVE
LAST BOWEL MOVEMENT: 65440
FORGETFULNESS: 1
BOWEL PATTERN NORMAL: 1
FORGETFULNESS: 1
CONSTIPATION: 1
BOWEL PATTERN NORMAL: 1
ABDOMINAL PAIN: 1
COUGH CHARACTERISTICS: WEAK
COUGH CHARACTERISTICS: NON-PRODUCTIVE
SOMNOLENCE: 1
SKIN LESIONS: 1
CONTUSION: 1
STOOL FREQUENCY: LESS THAN DAILY
DYSPNEA ON EXERTION: 1
FORGETFULNESS: 1
LOWER EXTREMITY EDEMA: 1
BOWEL PATTERN NORMAL: 1
ABDOMINAL PAIN: 1
COUGH CHARACTERISTICS: DRY
LOWER EXTREMITY EDEMA: 1
COUGH: 1
FATIGUES EASILY: 1
SLEEP QUALITY: ADEQUATE
CONTUSION: 1
SOMNOLENCE: 1
FATIGUES EASILY: 1
LOWER EXTREMITY EDEMA: 1
COUGH: 1
COUGH CHARACTERISTICS: WEAK
COUGH CHARACTERISTICS: WEAK
CONTUSION: 1
BOWEL PATTERN NORMAL: 1
CONTUSION: 1
LAST BOWEL MOVEMENT: 65491
LOWER EXTREMITY EDEMA: 1
CONSTIPATION: 1
SKIN LESIONS: 1
FATIGUES EASILY: 1
LAST BOWEL MOVEMENT: 65449
BOWEL PATTERN NORMAL: 1
LOWER EXTREMITY EDEMA: 1
COUGH CHARACTERISTICS: DRY
FATIGUE: 1
LOWER EXTREMITY EDEMA: 1
CONTUSION: 1
FORGETFULNESS: 1
CONTUSION: 1
SLEEP QUALITY: ADEQUATE
SLEEP QUALITY: ADEQUATE
SKIN LESIONS: 1
STOOL FREQUENCY: LESS THAN DAILY
LAST BOWEL MOVEMENT: 65517
DESCRIPTION OF MEMORY LOSS: SHORT TERM
BOWEL PATTERN NORMAL: 1
LAST BOWEL MOVEMENT: 65530
COUGH CHARACTERISTICS: DRY
SLEEP QUALITY: ADEQUATE
LAST BOWEL MOVEMENT: 65399
FORGETFULNESS: 1
LAST BOWEL MOVEMENT: 65399
FATIGUES EASILY: 1
STOOL FREQUENCY: LESS THAN DAILY
CONSTIPATION: 1
FATIGUES EASILY: 1
FORGETFULNESS: 1
CONTUSION: 1
FATIGUES EASILY: 1
COUGH CHARACTERISTICS: WEAK
LAST BOWEL MOVEMENT: 65385
COUGH CHARACTERISTICS: DRY
LAST BOWEL MOVEMENT: 65422
CONSTIPATION: 1
SOMNOLENCE: 1
FORGETFULNESS: 1
STOOL FREQUENCY: LESS THAN DAILY
SLEEP QUALITY: FAIR
STOOL FREQUENCY: LESS THAN DAILY
LAST BOWEL MOVEMENT: 65482
FORGETFULNESS: 1
SOMNOLENCE: 1
FORGETFULNESS: 1
FATIGUE: 1
COUGH CHARACTERISTICS: NON-PRODUCTIVE
LOWER EXTREMITY EDEMA: 1
FATIGUES EASILY: 1
AGITATION: 1
LAST BOWEL MOVEMENT: 65475
COUGH: 1
ABDOMINAL PAIN: 1
SKIN LESIONS: 1
COUGH CHARACTERISTICS: DRY
CONSTIPATION: 1
FATIGUES EASILY: 1
DESCRIPTION OF MEMORY LOSS: SHORT TERM
FORGETFULNESS: 1
FATIGUE: 1
SLEEP QUALITY: ADEQUATE
FATIGUE: 1
STOOL FREQUENCY: LESS THAN DAILY
COUGH: 1
DESCRIPTION OF MEMORY LOSS: SHORT TERM
CONSTIPATION: 1
LAST BOWEL MOVEMENT: 65471
SOMNOLENCE: 1
SLEEP QUALITY: FAIR
BOWEL PATTERN NORMAL: 1
FATIGUE: 1
FORGETFULNESS: 1
STOOL FREQUENCY: LESS THAN DAILY
FATIGUE: 1
FATIGUE: 1
DESCRIPTION OF MEMORY LOSS: SHORT TERM
COUGH: 1
SKIN LESIONS: 1
LOWER EXTREMITY EDEMA: 1
DESCRIPTION OF MEMORY LOSS: SHORT TERM
SLEEP QUALITY: ADEQUATE
COUGH CHARACTERISTICS: NON-PRODUCTIVE
FATIGUE: 1
ABDOMINAL PAIN: 1
BOWEL PATTERN NORMAL: 1
STOOL FREQUENCY: LESS THAN DAILY
DYSPNEA ON EXERTION: 1
LOWER EXTREMITY EDEMA: 1
DYSPNEA ON EXERTION: 1
LAST BOWEL MOVEMENT: 65530
LOWER EXTREMITY EDEMA: 1
SKIN LESIONS: 1
COUGH CHARACTERISTICS: WEAK
DESCRIPTION OF MEMORY LOSS: SHORT TERM
CONTUSION: 1
SLEEP QUALITY: FAIR
LOWER EXTREMITY EDEMA: 1
COUGH CHARACTERISTICS: NON-PRODUCTIVE
COUGH CHARACTERISTICS: NON-PRODUCTIVE
CONSTIPATION: 1
SLEEP QUALITY: FAIR
DYSPNEA ON EXERTION: 1
STOOL FREQUENCY: LESS THAN DAILY
FATIGUE: 1
DYSPNEA ON EXERTION: 1
STOOL FREQUENCY: LESS THAN DAILY
FATIGUE: 1
DYSPNEA ON EXERTION: 1
STOOL FREQUENCY: LESS THAN DAILY
FORGETFULNESS: 1
DESCRIPTION OF MEMORY LOSS: SHORT TERM
STOOL FREQUENCY: LESS THAN DAILY
STOOL FREQUENCY: LESS THAN DAILY
DESCRIPTION OF MEMORY LOSS: SHORT TERM
FATIGUE: 1
SLEEP QUALITY: FAIR
SLEEP QUALITY: ADEQUATE
COUGH: 1
LAST BOWEL MOVEMENT: 65503
SLEEP QUALITY: ADEQUATE
STOOL FREQUENCY: LESS THAN DAILY
CONTUSION: 1
FATIGUES EASILY: 1
DYSPNEA ON EXERTION: 1
DESCRIPTION OF MEMORY LOSS: SHORT TERM
DESCRIPTION OF MEMORY LOSS: SHORT TERM
COUGH CHARACTERISTICS: WEAK
FATIGUES EASILY: 1
CONTUSION: 1
STOOL FREQUENCY: LESS THAN DAILY
COUGH CHARACTERISTICS: WEAK
ABDOMINAL PAIN: 1
COUGH: 1
LAST BOWEL MOVEMENT: 65393
FATIGUE: 1
FATIGUE: 1
LAST BOWEL MOVEMENT: 65413
BOWEL PATTERN NORMAL: 1
FATIGUE: 1
BOWEL PATTERN NORMAL: 1
CONTUSION: 1
DESCRIPTION OF MEMORY LOSS: SHORT TERM
LAST BOWEL MOVEMENT: 65379
FATIGUES EASILY: 1
DESCRIPTION OF MEMORY LOSS: SHORT TERM
COUGH CHARACTERISTICS: DRY
FATIGUES EASILY: 1
FATIGUES EASILY: 1
SLEEP QUALITY: FAIR
BOWEL PATTERN NORMAL: 1
SOMNOLENCE: 1
SLEEP QUALITY: ADEQUATE
COUGH CHARACTERISTICS: NON-PRODUCTIVE
FORGETFULNESS: 1
SLEEP QUALITY: ADEQUATE
LAST BOWEL MOVEMENT: 65512
SLEEP QUALITY: FAIR
FORGETFULNESS: 1
SKIN LESIONS: 1
BOWEL PATTERN NORMAL: 1
SLEEP QUALITY: FAIR
SKIN LESIONS: 1
LOWER EXTREMITY EDEMA: 1
AGITATION: 1
STOOL FREQUENCY: LESS THAN DAILY
FATIGUES EASILY: 1
FORGETFULNESS: 1
FORGETFULNESS: 1
ABDOMINAL PAIN: 1
SKIN LESIONS: 1
DYSPNEA ON EXERTION: 1
FATIGUES EASILY: 1
FATIGUES EASILY: 1
SOMNOLENCE: 1
COUGH CHARACTERISTICS: WEAK
SLEEP QUALITY: FAIR
SLEEP QUALITY: FAIR
STOOL FREQUENCY: LESS THAN DAILY
COUGH CHARACTERISTICS: NON-PRODUCTIVE
SLEEP QUALITY: ADEQUATE
FORGETFULNESS: 1
STOOL FREQUENCY: LESS THAN DAILY
COUGH CHARACTERISTICS: DRY
LOWER EXTREMITY EDEMA: 1
COUGH: 1
DYSPNEA ON EXERTION: 1
DYSPNEA ON EXERTION: 1
FATIGUES EASILY: 1
STOOL FREQUENCY: LESS THAN DAILY
FATIGUES EASILY: 1
CONSTIPATION: 1
COUGH CHARACTERISTICS: NON-PRODUCTIVE
FATIGUES EASILY: 1
BOWEL PATTERN NORMAL: 1
FATIGUES EASILY: 1
LOWER EXTREMITY EDEMA: 1
FATIGUE: 1
DESCRIPTION OF MEMORY LOSS: SHORT TERM
COUGH CHARACTERISTICS: DRY
LAST BOWEL MOVEMENT: 65456
LOWER EXTREMITY EDEMA: 1
STOOL FREQUENCY: LESS THAN DAILY
LOWER EXTREMITY EDEMA: 1
FATIGUE: 1
LOWER EXTREMITY EDEMA: 1

## 2020-01-01 ASSESSMENT — ACTIVITIES OF DAILY LIVING (ADL)
MONEY MANAGEMENT (EXPENSES/BILLS): NEEDS ASSISTANCE
MONEY MANAGEMENT (EXPENSES/BILLS): TOTALLY DEPENDENT
MONEY MANAGEMENT (EXPENSES/BILLS): NEEDS ASSISTANCE
MONEY MANAGEMENT (EXPENSES/BILLS): NEEDS ASSISTANCE
MONEY MANAGEMENT (EXPENSES/BILLS): TOTALLY DEPENDENT
MONEY MANAGEMENT (EXPENSES/BILLS): NEEDS ASSISTANCE
MONEY MANAGEMENT (EXPENSES/BILLS): TOTALLY DEPENDENT
MONEY MANAGEMENT (EXPENSES/BILLS): TOTALLY DEPENDENT
MONEY MANAGEMENT (EXPENSES/BILLS): NEEDS ASSISTANCE
MONEY MANAGEMENT (EXPENSES/BILLS): NEEDS ASSISTANCE
MONEY MANAGEMENT (EXPENSES/BILLS): TOTALLY DEPENDENT
MONEY MANAGEMENT (EXPENSES/BILLS): TOTALLY DEPENDENT

## 2020-01-01 ASSESSMENT — SOCIAL DETERMINANTS OF HEALTH (SDOH)
ACTIVE STRESSOR - HEALTH CHANGES: 1
ACTIVE STRESSOR - EXPRESSED EMOTIONAL NEED: 1
ACTIVE STRESSOR - HEALTH CHANGES: 1
ACTIVE STRESSOR - HEALTH CHANGES: 1

## 2020-01-28 NOTE — TELEPHONE ENCOUNTER
Discussed with Sangeeta, hospice nurse.  Patient and  are wanting to stop hospice.  It seems a little unclear what they are expecting to happen.  She is on 15 of MS Contin in the morning and 30 at night and also 3 or 4 hydrocodone 10/day.  That is beyond what I can write.  I would have to refer her to pain management if they decide to do that.  She will discuss this further with the patient.  If they do want to go that way I am happy to refer to pain management.

## 2020-01-28 NOTE — TELEPHONE ENCOUNTER
1. Caller Name: Sangeeta/ [facility]                      Call Back Number: [phone] or cell [phone]    2. Message: patient would like to revoke hospice and Sangeeta would like to go over a few things with you regarding her care and medications    3. Patient approves office to leave a detailed voicemail/MyChart message: yes

## 2020-02-03 NOTE — TELEPHONE ENCOUNTER
1. Caller Name: Lakisha                      Call Back Number: [phone] (home)       2. Message: Patient would like you to call her regarding her pain medication     3. Patient approves office to leave a detailed voicemail/MyChart message: yes

## 2020-02-04 NOTE — TELEPHONE ENCOUNTER
She is reducing on the morphine and weaning off.  They are going to take her off as it did not work well and she is now on 5 hydrocodone 10 per day.  She will see me in March. She no longer wants hospice.   Duloxetine and prednisone renewed.  He has the lisinopril.

## 2020-04-16 NOTE — PROGRESS NOTES
1. [facility] Verified: yes    2. Verify PCP: yes    3. Review and add  to Care Team: yes    4. WebIZ Checked & Epic Updated: No WebIZ record  WebIZ Recommendations: Patient is up to date on all vaccines  Is patient due for Tdap? NO  Is patient due for Shingles? NO    5. Reviewed/Updated the following with patient:       •   Communication Preference Obtained? YES  • [employer] of [employer] Activation: already active       •   E-Mail Address Obtained? YES       •   Appointment Day and Time Preferences? YES       •   Preferred Pharmacy? YES       •   Preferred Lab? YES    6. Care Gap Scheduling (Attempt to Schedule EACH Overdue Care Gap!)

## 2020-06-23 ENCOUNTER — HOME CARE VISIT (OUTPATIENT)
Dept: HOSPICE | Facility: HOSPICE | Age: 75
End: 2020-06-23
Payer: MEDICARE

## 2020-12-29 NOTE — TELEPHONE ENCOUNTER
Discussed the transition with Sangeeta from hospice.  They will cover her medications until she sees me in March.  
Sangeeta from [facility] would like to discuss patient blessing garza. Sangeeta states she is revoking hospice and she would like to speak with you regarding this issue her cell is [phone]  
no chest pain, no cough, and no shortness of breath.

## 2023-03-21 NOTE — PROGRESS NOTES
Chief Complaint   Patient presents with   • Hypertension     3 months   • Depression   • Arthritis       Subjective:     HPI:   Lakisha Bhatti presents today with the followin. Essential hypertension  HTN - Chronic condition stable. Currently taking all meds as directed.   She is taking 325 mg aspirin daily along with clopidogrel.   She is not monitoring BP at home. However, they have a cuff and they will start checking their blood pressures twice daily. They will let me know if either of them have blood pressures consistently in the 150s and 160s or higher.  Denies symptoms low BP: light-headed, tunnel-vision, unusual fatigue.   Denies symptoms high BP:pounding headache, visual changes, palpitations, flushed face.   Denies medicine side effects: unusual fatigue, slow heartbeat, foot/leg swelling, cough.    2. Mild protein-calorie malnutrition (CMS-HCC)  She is doing better, weight has stabilized. She and her  feels she is eating better overall. Trying to increase the protein.    3. 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 She does still have occasional aching in that arm. However, no new swelling or weakness.    4. History of hemiplegia/History of cerebrovascular accident with residual effects  She did have hemiplegia with her CVA but this largely resolved. She feels her right leg is still a little weak but has full function walking and function of the right arm and hand.  Her residual effects are mild. She continues high-dose aspirin and clopidogrel for prevention.    5. Major depressive disorder with single episode, in partial remission (CMS-HCC)  Here for: depression.   Current symptoms include: insomnia, difficulty concentrating,  depressed mood, weight loss, fatigue, feelings of worthlessness/guilt, difficulty concentrating, hopelessness and impaired memory  Since last OV, symptoms are better.  She and her  both feel she is doing much  What Type Of Note Output Would You Prefer (Optional)?: Bullet Format How Severe Are Your Spot(S)?: mild better.  She is taking medicine daily as directed. Side effects: None noted.  Mood currently does affect:  relationships, social activities.  However, she feels she is participating with family and friends more than in the past.  Denies agoraphobia, panic attacks, SI/HI. (Denies wishing to be dead, thinking about death, intent to commit suicide, previous suicide attempt)     Review Of Systems  Taking supplements to help mood or symptoms: no  Using alcohol or other substances to help mood or symptoms: no  No polydipsia, polyuria, temperature intolerance, bowel changes  No visual or auditory hallucinations. Denies symptoms of jose: grandiosity, euphoria, need for little or no sleep, rapid pressured speech, spending sprees, reckless or risky behavior, hypersexual behavior.   Pertinent  ROS findings as above. All other systems reviewed and are negative.    6. Rheumatoid arthritis involving multiple sites with positive rheumatoid factor (CMS-[facility])  She continues to follow with Dr. Goodwin and is treated with prednisone. She has unfortunately been unable to tolerate any other regimen.    7. Vertebrogenic pain syndrome/Fracture of vertebra due to osteoporosis with routine healing  Denies any new back pain. This has now been quite stable.    8. Closed fracture of sacrum, unspecified portion of sacrum, sequela/History of fracture of pelvis  This has healed well. She states there is still some aching pain occasionally in that area but that is not the main source of her pain. Her arthritis continues to be the main source of her pain.    9. Rheumatoid arthritis involving multiple joints (CMS-[facility])  Denies somnolence or confusion from medication regimen. States the pain pills take the pain from a 9 down to around 6 and sometimes much better depending on her arthritis and the prednisone. She has mild constipation from the medication but controls this with over-the-counter treatment.    10. Chronic use of opiate for therapeutic  Have Your Spot(S) Been Treated In The Past?: has not been treated purpose  No aberrant or addictive use of medications has been observed.  Patient counseled to not add Tylenol to current regimen.  Patient counseled to keep medications locked up or under personal control. [facility] board of pharmacy interface is reviewed.  No inconsistencies are found.  Her average MME is 60, active is 60, max active is 60.  This is within CDC guideline for chronic pain prescribing by primary care.        Patient Active Problem List    Diagnosis Date Noted   • History of cerebrovascular accident with residual effects 01/01/2010     Priority: High   • Protein calorie malnutrition (CMS-HCC) 07/03/2017   • Osteopenia of right thigh 05/04/2017   • Hypokalemia 04/05/2017   • Sacral fracture, closed (CMS-HCC) 04/04/2017   • Fall 04/03/2017   • Intractable pain 04/03/2017   • Major depressive disorder with single episode, in partial remission (CMS-HCC) 01/06/2017   • Claudication of right lower extremity (CMS-HCC) 12/02/2016   • Iliac artery stenosis, right (CMS-HCC)    • Rheumatoid arthritis involving multiple sites with positive rheumatoid factor (CMS-HCC) 05/21/2016   • Chronic back pain 05/21/2016   • Hypertension 05/21/2016   • Unintended weight loss 05/21/2016   • Cerebrovascular accident (CVA) with involvement of right side of body (CMS-HCC) 05/20/2016   • 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   • Abdominal aortic atherosclerosis (CMS-HCC) 11/10/2015   • 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   • Chronic use of opiate for therapeutic purpose 11/10/2015   • Fracture of vertebra due to osteoporosis with routine healing 08/14/2015   • Tobacco dependence 01/14/2015   • Vertebrogenic pain syndrome    • MEDICAL HOME 10/10/2012   • Postmenopausal osteoporosis    • Vitamin D deficiency disease    • Dyslipidemia, goal LDL below 100    • Essential hypertension 02/04/2010   • Bilateral carotid artery stenosis    • Rheumatoid arthritis involving multiple joints (CMS-HCC)        Current medicines  "(including changes today)  Current Outpatient Prescriptions   Medication Sig Dispense Refill   • clopidogrel (PLAVIX) 75 MG Tab Take 1 Tab by mouth every day. 90 Tab 3   • [START ON 6/20/2018] HYDROcodone/acetaminophen (NORCO)  MG Tab Take 1 Tab by mouth every four hours as needed for Moderate Pain or Severe Pain for up to 30 days. 180 Tab 0   • diltiazem CD (CARTIA XT) 180 MG CAPSULE SR 24 HR Take 1 Cap by mouth every day. 90 Cap 3   • trazodone (DESYREL) 100 MG Tab Take 1 Tab by mouth every bedtime. 90 Tab 3   • atorvastatin (LIPITOR) 40 MG Tab Take 1 Tab by mouth every bedtime. 90 Tab 3   • mirtazapine (REMERON) 15 MG Tab Take 1 Tab by mouth every bedtime. 90 Tab 3   • aspirin  MG Tablet Delayed Response Take 1 Tab by mouth every day. 100 Tab 3   • lisinopril (PRINIVIL, ZESTRIL) 40 MG tablet Take 1 Tab by mouth every day. 90 Tab 3   • predniSONE (DELTASONE) 5 MG Tab Take 5 mg by mouth every day.     • folic acid (FOLVITE) 1 MG TABS Take 2 mg by mouth every day.       No current facility-administered medications for this visit.        Allergies   Allergen Reactions   • Gold Hives   • Hydrochlorothiazide [Hctz] Itching   • Imuran [Azathioprine Sodium] Vomiting   • Naproxen Itching   • Azathioprine      DOES NOT REMEMBER REACTIOON   • Hydrochlorothiazide    • Naproxen    • Relafen [Nabumetone]      Did not work   • Tape      Paper tape is ok       ROS: As per HPI       Objective:     Blood pressure (!) 162/84, pulse 67, temperature 37.2 °C (99 °F), resp. rate 18, height 1.6 m (5' 3\"), weight 39.5 kg (87 lb), SpO2 95 %, not currently breastfeeding. Body mass index is 15.41 kg/m².    Physical Exam:  Constitutional: Well-developed and well-nourished. Not diaphoretic. No distress. Lucid and fluent.  Skin: Skin is warm and dry. No rash noted.  Head: Atraumatic without lesions.  Eyes: Conjunctivae and extraocular motions are normal. Pupils are equal, round, and reactive to light. No scleral icterus.   Ears:  " Hpi Title: Evaluation of Skin Lesions External ears unremarkable. Tympanic membranes clear and intact.  Nose: Nares patent. Mucosa without edema or erythema. No discharge. No facial tenderness.  Mouth/Throat: Tongue normal. Oropharynx is clear and moist. Posterior pharynx without erythema or exudates.  Neck: Supple, trachea midline. No thyromegaly present. No cervical or supraclavicular lymphadenopathy. No JVD or carotid bruits appreciated  Cardiovascular: Regular rate and rhythm.  Normal S1, S2 without murmur appreciated.  Chest: Effort normal. Clear to auscultation throughout. No adventitious sounds.   Abdomen: Soft, non tender, and without distention. Active bowel sounds in all four quadrants. No rebound, guarding, masses or hepatosplenomegaly.  Extremities: No cyanosis, clubbing, erythema, nor edema.  She is moderately kyphotic. Extremities show distortion of joints, especially in her hands.  Neurological: Alert and oriented x 3. DTRs 2+/3 and symmetric.   Psychiatric:  Behavior, mood, and affect are appropriate.       Assessment and Plan:     72 y.o. female with the following issues:    1. Essential hypertension  COMP METABOLIC PANEL    LIPID PROFILE   2. Mild protein-calorie malnutrition (CMS-HCC)  COMP METABOLIC PANEL    CBC WITHOUT DIFFERENTIAL    TSH   3. 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CMS-[facility])     4. History of hemiplegia     5. Major depressive disorder with single episode, in partial remission (CMS-HCC)  TSH   6. Rheumatoid arthritis involving multiple sites with positive rheumatoid factor (CMS-[facility])  HYDROcodone/acetaminophen (NORCO)  MG Tab    DISCONTINUED: HYDROcodone/acetaminophen (NORCO)  MG Tab    DISCONTINUED: HYDROcodone/acetaminophen (NORCO)  MG Tab   7. History of fracture of pelvis     8. History of cerebrovascular accident with residual effects  clopidogrel (PLAVIX) 75 MG Tab    COMP METABOLIC PANEL    LIPID PROFILE   9. Vertebrogenic pain syndrome  HYDROcodone/acetaminophen (NORCO)  MG Tab    DISCONTINUED:  HYDROcodone/acetaminophen (NORCO)  MG Tab    DISCONTINUED: HYDROcodone/acetaminophen (NORCO)  MG Tab   10. Fracture of vertebra due to osteoporosis with routine healing  HYDROcodone/acetaminophen (NORCO)  MG Tab    DISCONTINUED: HYDROcodone/acetaminophen (NORCO)  MG Tab    DISCONTINUED: HYDROcodone/acetaminophen (NORCO)  MG Tab   11. Closed fracture of sacrum, unspecified portion of sacrum, sequela     12. Rheumatoid arthritis involving multiple joints (CMS-HCC)  HYDROcodone/acetaminophen (NORCO)  MG Tab    DISCONTINUED: HYDROcodone/acetaminophen (NORCO)  MG Tab    DISCONTINUED: HYDROcodone/acetaminophen (NORCO)  MG Tab   13. Chronic use of opiate for therapeutic purpose  CONSENT FOR OPIATE PRESCRIPTION         Followup: Return in about 3 months (around 7/4/2018), or if symptoms worsen or fail to improve.